# Patient Record
Sex: MALE | Race: BLACK OR AFRICAN AMERICAN | NOT HISPANIC OR LATINO | Employment: OTHER | URBAN - METROPOLITAN AREA
[De-identification: names, ages, dates, MRNs, and addresses within clinical notes are randomized per-mention and may not be internally consistent; named-entity substitution may affect disease eponyms.]

---

## 2018-03-08 ENCOUNTER — PES CALL (OUTPATIENT)
Dept: ADMINISTRATIVE | Facility: CLINIC | Age: 73
End: 2018-03-08

## 2019-03-19 ENCOUNTER — OFFICE VISIT (OUTPATIENT)
Dept: FAMILY MEDICINE | Facility: CLINIC | Age: 74
End: 2019-03-19
Payer: MEDICARE

## 2019-03-19 VITALS
TEMPERATURE: 98 F | BODY MASS INDEX: 21.08 KG/M2 | OXYGEN SATURATION: 99 % | HEART RATE: 95 BPM | HEIGHT: 70 IN | DIASTOLIC BLOOD PRESSURE: 85 MMHG | SYSTOLIC BLOOD PRESSURE: 137 MMHG | WEIGHT: 147.25 LBS

## 2019-03-19 DIAGNOSIS — R73.09 ABNORMAL GLUCOSE: ICD-10-CM

## 2019-03-19 DIAGNOSIS — R94.6 ABNORMAL THYROID EXAM: ICD-10-CM

## 2019-03-19 DIAGNOSIS — Z13.6 ENCOUNTER FOR ABDOMINAL AORTIC ANEURYSM (AAA) SCREENING: ICD-10-CM

## 2019-03-19 DIAGNOSIS — E55.9 VITAMIN D DEFICIENCY: ICD-10-CM

## 2019-03-19 DIAGNOSIS — E78.5 HYPERLIPIDEMIA, UNSPECIFIED HYPERLIPIDEMIA TYPE: ICD-10-CM

## 2019-03-19 DIAGNOSIS — I10 ESSENTIAL (PRIMARY) HYPERTENSION: ICD-10-CM

## 2019-03-19 DIAGNOSIS — Z00.00 ANNUAL PHYSICAL EXAM: Primary | ICD-10-CM

## 2019-03-19 DIAGNOSIS — Z12.11 COLON CANCER SCREENING: ICD-10-CM

## 2019-03-19 DIAGNOSIS — Z23 NEED FOR PNEUMOCOCCAL VACCINE: ICD-10-CM

## 2019-03-19 PROCEDURE — 3075F SYST BP GE 130 - 139MM HG: CPT | Mod: HCNC,CPTII,S$GLB, | Performed by: FAMILY MEDICINE

## 2019-03-19 PROCEDURE — 99499 RISK ADDL DX/OHS AUDIT: ICD-10-PCS | Mod: S$GLB,,, | Performed by: FAMILY MEDICINE

## 2019-03-19 PROCEDURE — 99999 PR PBB SHADOW E&M-EST. PATIENT-LVL III: ICD-10-PCS | Mod: PBBFAC,HCNC,, | Performed by: FAMILY MEDICINE

## 2019-03-19 PROCEDURE — G0009 ADMIN PNEUMOCOCCAL VACCINE: HCPCS | Mod: HCNC,S$GLB,, | Performed by: FAMILY MEDICINE

## 2019-03-19 PROCEDURE — 99999 PR PBB SHADOW E&M-EST. PATIENT-LVL III: CPT | Mod: PBBFAC,HCNC,, | Performed by: FAMILY MEDICINE

## 2019-03-19 PROCEDURE — 99499 UNLISTED E&M SERVICE: CPT | Mod: S$GLB,,, | Performed by: FAMILY MEDICINE

## 2019-03-19 PROCEDURE — 99397 PER PM REEVAL EST PAT 65+ YR: CPT | Mod: HCNC,25,S$GLB, | Performed by: FAMILY MEDICINE

## 2019-03-19 PROCEDURE — 99397 PR PREVENTIVE VISIT,EST,65 & OVER: ICD-10-PCS | Mod: HCNC,25,S$GLB, | Performed by: FAMILY MEDICINE

## 2019-03-19 PROCEDURE — G0009 PNEUMOCOCCAL CONJUGATE VACCINE 13-VALENT LESS THAN 5YO & GREATER THAN: ICD-10-PCS | Mod: HCNC,S$GLB,, | Performed by: FAMILY MEDICINE

## 2019-03-19 PROCEDURE — 90670 PNEUMOCOCCAL CONJUGATE VACCINE 13-VALENT LESS THAN 5YO & GREATER THAN: ICD-10-PCS | Mod: HCNC,S$GLB,, | Performed by: FAMILY MEDICINE

## 2019-03-19 PROCEDURE — 3079F PR MOST RECENT DIASTOLIC BLOOD PRESSURE 80-89 MM HG: ICD-10-PCS | Mod: HCNC,CPTII,S$GLB, | Performed by: FAMILY MEDICINE

## 2019-03-19 PROCEDURE — 90670 PCV13 VACCINE IM: CPT | Mod: HCNC,S$GLB,, | Performed by: FAMILY MEDICINE

## 2019-03-19 PROCEDURE — 3079F DIAST BP 80-89 MM HG: CPT | Mod: HCNC,CPTII,S$GLB, | Performed by: FAMILY MEDICINE

## 2019-03-19 PROCEDURE — 3075F PR MOST RECENT SYSTOLIC BLOOD PRESS GE 130-139MM HG: ICD-10-PCS | Mod: HCNC,CPTII,S$GLB, | Performed by: FAMILY MEDICINE

## 2019-03-19 RX ORDER — HYDROCHLOROTHIAZIDE 12.5 MG/1
12.5 CAPSULE ORAL DAILY
Qty: 90 CAPSULE | Refills: 0 | Status: SHIPPED | OUTPATIENT
Start: 2019-03-19 | End: 2019-05-24 | Stop reason: SDUPTHER

## 2019-03-19 NOTE — PROGRESS NOTES
Office Visit    Patient Name: Gera Londono    : 1945  MRN: [id number]      Assessment/Plan:  Gera Londono is a 74 y.o. male who presents today for :    Annual physical exam  -     CBC Without Differential; Future; Expected date: 2019  -     Comprehensive metabolic panel; Future; Expected date: 2019  -     Hepatitis C antibody; Future; Expected date: 2019  -     Urinalysis Microscopic; Future; Expected date: 2019  -labs reviewed with patient  -anticipatory guidance provided with age appropriate preventative services discussed, healthy diet and regular physical exercise also discussed with patient  -Diet and exercise planning discussed.  -Recommend 15-30 minutes of moderate intensity exercise 5 days/week.  -Patient was advised to get Zostrix/Td at the pharmacy.    Essential (primary) hypertension  -     hydroCHLOROthiazide (MICROZIDE) 12.5 mg capsule; Take 1 capsule (12.5 mg total) by mouth once daily.  Dispense: 90 capsule; Refill: 0  - continue current medication  - ?advised DASH diet, portion control, regular cardiovascular exercises    Colon cancer screening  -     Case request GI: COLONOSCOPY    Hyperlipidemia, unspecified hyperlipidemia type  -     Lipid panel; Future; Expected date: 2019    Abnormal glucose  -     Hemoglobin A1c; Future; Expected date: 2019    Need for pneumococcal vaccine  -     Pneumococcal Conjugate Vaccine (13 Valent) (IM)    Encounter for abdominal aortic aneurysm (AAA) screening  -     US Abdominal Aorta; Future; Expected date: 2019    Vitamin D deficiency  -     Vitamin D; Future; Expected date: 2019    Abnormal thyroid exam  -     TSH; Future; Expected date: 2019      Follow-up in about 4 weeks (around 2019) for any evaluation as needed.     This note was created by combination of typed  and Dragon dictation.  Transcription errors may be present.  If there are any questions, please contact  me.        ----------------------------------------------------------------------------------------------------------------------      HPI:  Patient Care Team:  Gopal Hobbs MD as PCP - General (Family Medicine)    Gera is a 74 y.o. male with      Patient Active Problem List   Diagnosis    Essential (primary) hypertension    Hyperlipidemia    Vitamin D deficiency     This patient is new to me   Patient presents today for:  Annual Exam        Patient is doing well and has no major complaints today. The only medication he takes on a daily basis is his HCTZ 12.5 mg daily - he reports that BP is well controlled at home, compliant with  medications daily without any adverse side effects. Overall, he has good appetite, no sleeping issues. He had a history of low Vit D and wants to get it checked today.   He is a former longtime smoker. He is due for colonoscopy, which we will order today    Additional ROS  No F/C/fatigue  No dysphagia/sore throat/rhinorrhea  No CP/TREVINO/palpitations/swelling  No cough/wheezing/SOB  No nausea/vomiting/abd pain/no diarrhea, no constipation, no blood in stool  No muscle aches, +chronic LBP   No rashes  No MSK weakness/HA/tingling/numbness  No anxiety/depression  No dysuria/hematuria  No polyuria/polydipsia/fatigue/cold or hot intolerance          Current Medications  Medications reviewed and updated.       Current Outpatient Medications:     atorvastatin (LIPITOR) 20 MG tablet, Take 1 tablet (20 mg total) by mouth once daily., Disp: 90 tablet, Rfl: 3    ergocalciferol (ERGOCALCIFEROL) 50,000 unit Cap, Take 1 capsule (50,000 Units total) by mouth every 7 days., Disp: 4 capsule, Rfl: 5    hydroCHLOROthiazide (MICROZIDE) 12.5 mg capsule, Take 1 capsule (12.5 mg total) by mouth once daily., Disp: 90 capsule, Rfl: 0    History reviewed. No pertinent surgical history.    Family History   Problem Relation Age of Onset    Diabetes Mother     Hypertension Mother     Arthritis Mother      "Arthritis Father     Diabetes Father     Hypertension Father     Heart disease Father     Diabetes Sister     Hypertension Sister     Arthritis Brother        Social History     Socioeconomic History    Marital status: Single     Spouse name: Not on file    Number of children: Not on file    Years of education: Not on file    Highest education level: Not on file   Social Needs    Financial resource strain: Not on file    Food insecurity - worry: Not on file    Food insecurity - inability: Not on file    Transportation needs - medical: Not on file    Transportation needs - non-medical: Not on file   Occupational History    Not on file   Tobacco Use    Smoking status: Former Smoker   Substance and Sexual Activity    Alcohol use: Not on file    Drug use: No    Sexual activity: Not on file   Other Topics Concern    Not on file   Social History Narrative    Not on file           Allergies   Review of patient's allergies indicates:  No Known Allergies          Review of Systems  See HPI      Physical Exam  /85   Pulse 95   Temp 97.7 °F (36.5 °C)   Ht 5' 10" (1.778 m)   Wt 66.8 kg (147 lb 4.3 oz)   SpO2 99%   BMI 21.13 kg/m²     GEN: NAD, well developed, pleasant, well nourished  HEENT: NCAT, PERRLA, EOMI, sclera clear, anicteric, bilateral ear exam wnl, O/P clear, MMM with no lesions  NECK: normal, supple with midline trachea, no LAD, no thyromegaly  LUNGS: CTAB, no w/r/r, no increased work of breathing   HEART: RRR, normal S1 and S2, no m/r/g, no edema  ABD: s/nt/nd, NABS  SKIN: normal turgor, no rashes  PSYCH: AOx3, appropriate mood and affect  MSK: warm/well perfused, normal ROM in all extremities, no c/c/e.      Labs  Lab Results   Component Value Date    HGBA1C 5.3 03/20/2019     Lab Results   Component Value Date     03/20/2019    K 4.4 03/20/2019     03/20/2019    CO2 22 (L) 03/20/2019    BUN 22 03/20/2019    CREATININE 1.4 03/20/2019    CALCIUM 10.7 (H) 03/20/2019    " ANIONGAP 13 03/20/2019    ESTGFRAFRICA 57.2 (A) 03/20/2019    EGFRNONAA 49.5 (A) 03/20/2019     Lab Results   Component Value Date    CHOL 225 (H) 03/20/2019    CHOL 223 (H) 06/13/2011    CHOL 260 (H) 05/23/2011     Lab Results   Component Value Date    HDL 53 03/20/2019    HDL 49 06/13/2011     Lab Results   Component Value Date    LDLCALC 157.0 03/20/2019    LDLCALC 158.6 06/13/2011     Lab Results   Component Value Date    TRIG 75 03/20/2019    TRIG 77 06/13/2011     Lab Results   Component Value Date    CHOLHDL 23.6 03/20/2019    CHOLHDL 22.0 06/13/2011     Last set of blood work has been reviewed as noted above.

## 2019-03-20 ENCOUNTER — LAB VISIT (OUTPATIENT)
Dept: LAB | Facility: HOSPITAL | Age: 74
End: 2019-03-20
Attending: FAMILY MEDICINE
Payer: MEDICARE

## 2019-03-20 DIAGNOSIS — Z00.00 ANNUAL PHYSICAL EXAM: ICD-10-CM

## 2019-03-20 DIAGNOSIS — E78.5 HYPERLIPIDEMIA, UNSPECIFIED HYPERLIPIDEMIA TYPE: ICD-10-CM

## 2019-03-20 DIAGNOSIS — R94.6 ABNORMAL THYROID EXAM: ICD-10-CM

## 2019-03-20 DIAGNOSIS — R73.09 ABNORMAL GLUCOSE: ICD-10-CM

## 2019-03-20 DIAGNOSIS — E55.9 VITAMIN D DEFICIENCY: ICD-10-CM

## 2019-03-20 LAB
25(OH)D3+25(OH)D2 SERPL-MCNC: 10 NG/ML
ALBUMIN SERPL BCP-MCNC: 4.3 G/DL
ALP SERPL-CCNC: 66 U/L
ALT SERPL W/O P-5'-P-CCNC: 9 U/L
ANION GAP SERPL CALC-SCNC: 13 MMOL/L
AST SERPL-CCNC: 18 U/L
BILIRUB SERPL-MCNC: 0.8 MG/DL
BUN SERPL-MCNC: 22 MG/DL
CALCIUM SERPL-MCNC: 10.7 MG/DL
CHLORIDE SERPL-SCNC: 103 MMOL/L
CHOLEST SERPL-MCNC: 225 MG/DL
CHOLEST/HDLC SERPL: 4.2 {RATIO}
CO2 SERPL-SCNC: 22 MMOL/L
CREAT SERPL-MCNC: 1.4 MG/DL
ERYTHROCYTE [DISTWIDTH] IN BLOOD BY AUTOMATED COUNT: 13.2 %
EST. GFR  (AFRICAN AMERICAN): 57.2 ML/MIN/1.73 M^2
EST. GFR  (NON AFRICAN AMERICAN): 49.5 ML/MIN/1.73 M^2
ESTIMATED AVG GLUCOSE: 105 MG/DL
GLUCOSE SERPL-MCNC: 113 MG/DL
HBA1C MFR BLD HPLC: 5.3 %
HCT VFR BLD AUTO: 42 %
HDLC SERPL-MCNC: 53 MG/DL
HDLC SERPL: 23.6 %
HGB BLD-MCNC: 14.2 G/DL
LDLC SERPL CALC-MCNC: 157 MG/DL
MCH RBC QN AUTO: 30.9 PG
MCHC RBC AUTO-ENTMCNC: 33.8 G/DL
MCV RBC AUTO: 92 FL
NONHDLC SERPL-MCNC: 172 MG/DL
PLATELET # BLD AUTO: 229 K/UL
PMV BLD AUTO: 9.9 FL
POTASSIUM SERPL-SCNC: 4.4 MMOL/L
PROT SERPL-MCNC: 8.8 G/DL
RBC # BLD AUTO: 4.59 M/UL
SODIUM SERPL-SCNC: 138 MMOL/L
TRIGL SERPL-MCNC: 75 MG/DL
TSH SERPL DL<=0.005 MIU/L-ACNC: 1.61 UIU/ML
WBC # BLD AUTO: 5.73 K/UL

## 2019-03-20 PROCEDURE — 86803 HEPATITIS C AB TEST: CPT | Mod: HCNC

## 2019-03-20 PROCEDURE — 84443 ASSAY THYROID STIM HORMONE: CPT | Mod: HCNC

## 2019-03-20 PROCEDURE — 36415 COLL VENOUS BLD VENIPUNCTURE: CPT | Mod: HCNC,PO

## 2019-03-20 PROCEDURE — 83036 HEMOGLOBIN GLYCOSYLATED A1C: CPT | Mod: HCNC

## 2019-03-20 PROCEDURE — 85027 COMPLETE CBC AUTOMATED: CPT | Mod: HCNC

## 2019-03-20 PROCEDURE — 80061 LIPID PANEL: CPT | Mod: HCNC

## 2019-03-20 PROCEDURE — 80053 COMPREHEN METABOLIC PANEL: CPT | Mod: HCNC

## 2019-03-20 PROCEDURE — 82306 VITAMIN D 25 HYDROXY: CPT | Mod: HCNC

## 2019-03-21 PROBLEM — E78.5 HYPERLIPIDEMIA: Status: ACTIVE | Noted: 2019-03-21

## 2019-03-21 PROBLEM — E55.9 VITAMIN D DEFICIENCY: Status: ACTIVE | Noted: 2019-03-21

## 2019-03-21 LAB — HCV AB SERPL QL IA: NEGATIVE

## 2019-03-21 RX ORDER — ERGOCALCIFEROL 1.25 MG/1
50000 CAPSULE ORAL
Qty: 4 CAPSULE | Refills: 5 | Status: SHIPPED | OUTPATIENT
Start: 2019-03-21 | End: 2019-05-24 | Stop reason: SDUPTHER

## 2019-03-21 RX ORDER — ATORVASTATIN CALCIUM 20 MG/1
20 TABLET, FILM COATED ORAL DAILY
Qty: 90 TABLET | Refills: 3 | Status: SHIPPED | OUTPATIENT
Start: 2019-03-21 | End: 2019-05-24 | Stop reason: SDUPTHER

## 2019-03-23 ENCOUNTER — TELEPHONE (OUTPATIENT)
Dept: FAMILY MEDICINE | Facility: CLINIC | Age: 74
End: 2019-03-23

## 2019-03-23 NOTE — TELEPHONE ENCOUNTER
----- Message from Lily Camp sent at 3/20/2019  3:41 PM CDT -----  Contact: [facility] - dharmesh Holland requesting clinical notes for bedside orders. Fax # [phone] phone # [phone]

## 2019-03-25 ENCOUNTER — TELEPHONE (OUTPATIENT)
Dept: FAMILY MEDICINE | Facility: CLINIC | Age: 74
End: 2019-03-25

## 2019-03-25 NOTE — TELEPHONE ENCOUNTER
----- Message from Sara Santizo LPN sent at 3/21/2019  3:33 PM CDT -----  Just spoke with Client regarding scheduling colonoscopy who stated he was told  that he would not need another colonoscopy since he was 74 years old.  He is refusing to schedule  If he still needs one please reach out to him and I will be happy to schedule this for him. KILEY Dunn

## 2019-03-25 NOTE — TELEPHONE ENCOUNTER
Patient stated he is not ready to schedule his Colonoscopy at this time to call him back in a month to schedule.

## 2019-03-25 NOTE — TELEPHONE ENCOUNTER
Please have patient obtain Colonoscopy.       Sara,   Patient does need a Colonoscopy, not sure why he said that he was told he didn't need one, as the Colonoscopy was ordered by me.     Thanks,     Dr. BENAVIDES

## 2019-04-03 ENCOUNTER — TELEPHONE (OUTPATIENT)
Dept: FAMILY MEDICINE | Facility: CLINIC | Age: 74
End: 2019-04-03

## 2019-04-03 NOTE — TELEPHONE ENCOUNTER
----- Message from Broderick Vazquez sent at 4/2/2019 11:30 AM CDT -----  Contact: Kelsey wife/[phone]  The patient wife would like to speak to the staff in regards to home health for the patient.            Thank you

## 2019-04-03 NOTE — TELEPHONE ENCOUNTER
Spoke to patients wife about her wanting him to have home health. She just wanted us to know that [facility] may be calling about his home health.

## 2019-05-20 ENCOUNTER — TELEPHONE (OUTPATIENT)
Dept: FAMILY MEDICINE | Facility: CLINIC | Age: 74
End: 2019-05-20

## 2019-05-20 NOTE — TELEPHONE ENCOUNTER
----- Message from Deangelo Garza sent at 5/20/2019 12:31 PM CDT -----  Contact: Kelsey [phone]  Type: Patient Call Back    Who called:Kelsey    What is the request in detail: Kelsey, care giver of the patient, is requesting a call back from the staff in regards to home health order for the patient    Can the clinic reply by [facility]?no    Would the patient rather a call back or a response via [facility]? Call back    Best call back number:[phone]

## 2019-05-20 NOTE — TELEPHONE ENCOUNTER
Spoke with patient's daughter and she is looking for orders for home health service to come out to bath patient, fix his meal and runs errands for patient. Informed her that Home Health services does not provider these services and that he is looking for PCA services. She will need to contact Medicaid to see what companies in her area provides this services.

## 2019-05-24 DIAGNOSIS — E78.5 HYPERLIPIDEMIA, UNSPECIFIED HYPERLIPIDEMIA TYPE: ICD-10-CM

## 2019-05-24 DIAGNOSIS — I10 ESSENTIAL (PRIMARY) HYPERTENSION: ICD-10-CM

## 2019-05-24 DIAGNOSIS — E55.9 VITAMIN D DEFICIENCY: ICD-10-CM

## 2019-05-25 RX ORDER — ATORVASTATIN CALCIUM 20 MG/1
20 TABLET, FILM COATED ORAL DAILY
Qty: 90 TABLET | Refills: 3 | Status: SHIPPED | OUTPATIENT
Start: 2019-05-25 | End: 2020-02-18 | Stop reason: SDUPTHER

## 2019-05-25 RX ORDER — ERGOCALCIFEROL 1.25 MG/1
50000 CAPSULE ORAL
Qty: 4 CAPSULE | Refills: 5 | Status: SHIPPED | OUTPATIENT
Start: 2019-05-25 | End: 2019-10-01 | Stop reason: SDUPTHER

## 2019-05-25 RX ORDER — HYDROCHLOROTHIAZIDE 12.5 MG/1
12.5 CAPSULE ORAL DAILY
Qty: 90 CAPSULE | Refills: 0 | Status: SHIPPED | OUTPATIENT
Start: 2019-05-25 | End: 2019-08-12 | Stop reason: SDUPTHER

## 2019-05-29 ENCOUNTER — TELEPHONE (OUTPATIENT)
Dept: FAMILY MEDICINE | Facility: CLINIC | Age: 74
End: 2019-05-29

## 2019-05-29 NOTE — TELEPHONE ENCOUNTER
----- Message from Natacha Lyon sent at 5/29/2019  3:34 PM CDT -----  Contact: Mary Estrada  Type:  Diabetic/Medical Supplies Request    Name of Caller: Mary Estrada    What supplies are needed: [employer] True metrix air meter, Test strips, true plus ultra thin 30g lancet, BD single use swabs, true level 1 control solutions    What is the brand of the supplies: True Metrix    Refill or New Rx: New     If checking glucose, how many times do they check it?:    Who prescribed the original supplies:    Pharmacy/Company Name, Phone #, Location: [facility]     Requesting a Call Back:  Call back    Would the patient rather a call back or a response via [facility]?  Call back    Best Call Back Number:[phone]    Additional Information:

## 2019-05-30 NOTE — TELEPHONE ENCOUNTER
Patient is not a diabetic to my knowledge per history and his last A1c was normal.     Please check with patient to see if he was sent diabetic supplies prior to today as [employer] contacted our office to reorder diabetic supplies.

## 2019-06-12 ENCOUNTER — TELEPHONE (OUTPATIENT)
Dept: FAMILY MEDICINE | Facility: CLINIC | Age: 74
End: 2019-06-12

## 2019-06-12 DIAGNOSIS — Z74.09 LIMITED MOBILITY: Primary | ICD-10-CM

## 2019-06-12 NOTE — TELEPHONE ENCOUNTER
----- Message from Jessica Sosa sent at 6/12/2019  3:41 PM CDT -----  Contact: [facility] georgina/Marcio  [phone] may leave a detailed message  Type: Nallely erickson/Marcio Call Back    Who called: Nallely    What is the request in detail: Patient is requesting a walker. With his insurance he needs an in network DME company. So she's calling to find who the Rx was sent to. Tried reaching the patient, but was unable to.    Would the patient rather a call back or a response via [facility]? Call back    Best call back number: [phone] may leave a detailed message.    Additional Information: Need to know if this was something Dr. Hobbs was trying to work on or not. If not, and you would like [facility] to, please call them.

## 2019-06-13 NOTE — TELEPHONE ENCOUNTER
Spoke with Nallely about patient needing walker but he needs a in network DME . Advised Nallely to use [facility] since patient does not have one listed

## 2019-06-13 NOTE — TELEPHONE ENCOUNTER
Order placed.    Please contact patient, and have a print out for order for patient to pickup or mail to patient. May need to call insurance carrier to see where they would want him to have this order sent to.    Limited mobility  -     WALKER FOR HOME USE

## 2019-08-12 ENCOUNTER — PES CALL (OUTPATIENT)
Dept: ADMINISTRATIVE | Facility: CLINIC | Age: 74
End: 2019-08-12

## 2019-08-12 DIAGNOSIS — I10 ESSENTIAL (PRIMARY) HYPERTENSION: ICD-10-CM

## 2019-08-12 DIAGNOSIS — E55.9 VITAMIN D DEFICIENCY: ICD-10-CM

## 2019-08-13 RX ORDER — HYDROCHLOROTHIAZIDE 12.5 MG/1
CAPSULE ORAL
Qty: 20 CAPSULE | Refills: 0 | Status: SHIPPED | OUTPATIENT
Start: 2019-08-13 | End: 2019-09-23 | Stop reason: SDUPTHER

## 2019-08-13 RX ORDER — ERGOCALCIFEROL 1.25 MG/1
CAPSULE ORAL
Qty: 13 CAPSULE | Refills: 5 | OUTPATIENT
Start: 2019-08-13

## 2019-09-23 DIAGNOSIS — I10 ESSENTIAL (PRIMARY) HYPERTENSION: ICD-10-CM

## 2019-09-23 RX ORDER — HYDROCHLOROTHIAZIDE 12.5 MG/1
CAPSULE ORAL
Qty: 14 CAPSULE | Refills: 0 | Status: SHIPPED | OUTPATIENT
Start: 2019-09-23 | End: 2019-09-23 | Stop reason: SDUPTHER

## 2019-09-23 NOTE — TELEPHONE ENCOUNTER
----- Message from Jessica Sosa sent at 9/23/2019  2:24 PM CDT -----  Contact: Fernandata Kelsey Choi  [phone]  Type: RX Refill Request    Who Called:Red Choi    Refill or New Rx: Refill    RX Name and Strength: hydroCHLOROthiazide (MICROZIDE) 12.5 mg capsule    Is this a 30 day or 90 day RX: 90 day    Preferred Pharmacy with phone number: .  [facility] - [place], [place] - [address]  [address] [address]  Phone: [phone] Fax: [phone]    Local or Mail Order: Local    Would the patient rather a call back or a response via [facility]? Call back    Best Call Back Number: [phone]    Additional Information:  Patient have 5 pills left and will need a refill called in before he runs out.

## 2019-09-24 RX ORDER — HYDROCHLOROTHIAZIDE 12.5 MG/1
CAPSULE ORAL
Qty: 30 CAPSULE | Refills: 0 | Status: SHIPPED | OUTPATIENT
Start: 2019-09-24 | End: 2019-10-01 | Stop reason: SDUPTHER

## 2019-10-01 DIAGNOSIS — E55.9 VITAMIN D DEFICIENCY: ICD-10-CM

## 2019-10-01 DIAGNOSIS — I10 ESSENTIAL (PRIMARY) HYPERTENSION: ICD-10-CM

## 2019-10-02 RX ORDER — HYDROCHLOROTHIAZIDE 12.5 MG/1
CAPSULE ORAL
Qty: 15 CAPSULE | Refills: 0 | Status: SHIPPED | OUTPATIENT
Start: 2019-10-02 | End: 2019-12-05 | Stop reason: SDUPTHER

## 2019-10-02 RX ORDER — ERGOCALCIFEROL 1.25 MG/1
CAPSULE ORAL
Qty: 13 CAPSULE | Refills: 5 | Status: SHIPPED | OUTPATIENT
Start: 2019-10-02 | End: 2020-02-18 | Stop reason: SDUPTHER

## 2019-11-21 DIAGNOSIS — I10 ESSENTIAL (PRIMARY) HYPERTENSION: ICD-10-CM

## 2019-11-22 RX ORDER — HYDROCHLOROTHIAZIDE 12.5 MG/1
CAPSULE ORAL
Qty: 90 CAPSULE | Refills: 0 | OUTPATIENT
Start: 2019-11-22

## 2019-12-04 ENCOUNTER — TELEPHONE (OUTPATIENT)
Dept: FAMILY MEDICINE | Facility: CLINIC | Age: 74
End: 2019-12-04

## 2019-12-04 DIAGNOSIS — I10 ESSENTIAL (PRIMARY) HYPERTENSION: ICD-10-CM

## 2019-12-04 NOTE — TELEPHONE ENCOUNTER
----- Message from Kleber Salgado sent at 12/4/2019  3:57 PM CST -----  Contact: sELF  Type: RX Refill Request    Who Called:  SELF  Have you contacted your pharmacy: NO   Refill or New Rx: REFILL     RX Name and Strength: hydroCHLOROthiazide (MICROZIDE) 12.5 mg capsule    Preferred Pharmacy with phone number: [facility] - [place] [address] [address] [phone] (Phone)  [phone] (Fax)    Local or Mail Order: LOCAL     Ordering Provider: DAIR    Would the patient rather a call back or a response via [facility]? CALL     Best Call Back Number: [phone]

## 2019-12-05 RX ORDER — HYDROCHLOROTHIAZIDE 12.5 MG/1
12.5 CAPSULE ORAL DAILY
Qty: 15 CAPSULE | Refills: 0 | Status: SHIPPED | OUTPATIENT
Start: 2019-12-05 | End: 2020-01-02 | Stop reason: SDUPTHER

## 2020-01-02 DIAGNOSIS — I10 ESSENTIAL (PRIMARY) HYPERTENSION: ICD-10-CM

## 2020-01-02 RX ORDER — HYDROCHLOROTHIAZIDE 12.5 MG/1
12.5 CAPSULE ORAL DAILY
Qty: 15 CAPSULE | Refills: 0 | Status: SHIPPED | OUTPATIENT
Start: 2020-01-02 | End: 2020-01-03 | Stop reason: SDUPTHER

## 2020-01-02 NOTE — TELEPHONE ENCOUNTER
----- Message from Bobby Hanks sent at 1/2/2020 11:43 AM CST -----  Contact: Self      Can the clinic reply in [facility]:Y        Please refill the medication(s) listed below. Please call the patient when the prescription(s) is ready for  at this phone number. Pt would like to know if he can get a 90 day supply.      Medication #1 hydroCHLOROthiazide (MICROZIDE) 12.5 mg capsule    Medication #2       Preferred Pharmacy: [id number] [address] [place] [address]      Kelsey Moon [phone]

## 2020-01-03 DIAGNOSIS — I10 ESSENTIAL (PRIMARY) HYPERTENSION: ICD-10-CM

## 2020-01-03 RX ORDER — HYDROCHLOROTHIAZIDE 12.5 MG/1
12.5 CAPSULE ORAL DAILY
Qty: 30 CAPSULE | Refills: 0 | Status: SHIPPED | OUTPATIENT
Start: 2020-01-03 | End: 2020-01-13 | Stop reason: SDUPTHER

## 2020-01-03 RX ORDER — HYDROCHLOROTHIAZIDE 12.5 MG/1
12.5 CAPSULE ORAL DAILY
Qty: 15 CAPSULE | Refills: 0 | Status: CANCELLED | OUTPATIENT
Start: 2020-01-03

## 2020-01-03 NOTE — TELEPHONE ENCOUNTER
Spoke with pt Red and informed her of the 30 day supply and that he will to schedule a ov for next refill and she said she will call back to schedule it

## 2020-01-03 NOTE — TELEPHONE ENCOUNTER
----- Message from Lily Camp sent at 1/3/2020  9:38 AM CST -----  Contact: beth cheek   Type: Patient Call Back    What is the request in detail: Beth calling to speak to a nurse regarding medication.     Can the clinic reply by [facility]? No    Would the patient rather a call back or a response via [facility]? Call back     Best call back number: [phone]

## 2020-01-03 NOTE — TELEPHONE ENCOUNTER
Spoke with pt beth Cole she stated pt will like 90 day supply for Hydrochlorothiazide I informed her that pt will needs a ov he haven't been seen since March 2019 Ms. Cole stated pt don't want to come in because he is not sick I offer to make f/u appt  she declined

## 2020-01-06 RX ORDER — HYDROCHLOROTHIAZIDE 12.5 MG/1
CAPSULE ORAL
Qty: 90 CAPSULE | OUTPATIENT
Start: 2020-01-06

## 2020-01-13 DIAGNOSIS — I10 ESSENTIAL (PRIMARY) HYPERTENSION: ICD-10-CM

## 2020-01-13 RX ORDER — HYDROCHLOROTHIAZIDE 12.5 MG/1
12.5 CAPSULE ORAL DAILY
Qty: 30 CAPSULE | Refills: 0 | Status: SHIPPED | OUTPATIENT
Start: 2020-01-13 | End: 2020-02-18 | Stop reason: SDUPTHER

## 2020-01-13 NOTE — TELEPHONE ENCOUNTER
----- Message from Jessica Sosa sent at 1/13/2020  1:19 PM CST -----  Contact: Red Choi  [phone]  Type: RX Refill Request    Who Called: Red Choi    Have you contacted your pharmacy: Yes. Medication was sent back to stock. Now please send to Mail Order Pharmacy.    Refill or New Rx: Refill    RX Name and Strength: hydroCHLOROthiazide (MICROZIDE) 12.5 mg capsule    Is this a 30 day or 90 day RX: 90 day    Preferred Pharmacy with phone number: .    [facility] - [place], [place] - [address]  [address] [facility] [address]  Phone: [phone] Fax: [phone]    Local or Mail Order: Mail Order    Would the patient rather a call back or a response via [facility]? Call back    Best Call Back Number: [phone]

## 2020-02-18 ENCOUNTER — OFFICE VISIT (OUTPATIENT)
Dept: FAMILY MEDICINE | Facility: CLINIC | Age: 75
End: 2020-02-18
Payer: MEDICARE

## 2020-02-18 VITALS
WEIGHT: 151.69 LBS | TEMPERATURE: 98 F | BODY MASS INDEX: 21.72 KG/M2 | OXYGEN SATURATION: 99 % | SYSTOLIC BLOOD PRESSURE: 137 MMHG | DIASTOLIC BLOOD PRESSURE: 85 MMHG | HEART RATE: 95 BPM | HEIGHT: 70 IN

## 2020-02-18 DIAGNOSIS — E78.5 HYPERLIPIDEMIA, UNSPECIFIED HYPERLIPIDEMIA TYPE: ICD-10-CM

## 2020-02-18 DIAGNOSIS — E55.9 VITAMIN D DEFICIENCY: ICD-10-CM

## 2020-02-18 DIAGNOSIS — Z12.11 COLON CANCER SCREENING: ICD-10-CM

## 2020-02-18 DIAGNOSIS — R54 AGE-RELATED PHYSICAL DEBILITY: ICD-10-CM

## 2020-02-18 DIAGNOSIS — I10 ESSENTIAL (PRIMARY) HYPERTENSION: Primary | ICD-10-CM

## 2020-02-18 DIAGNOSIS — E78.49 OTHER HYPERLIPIDEMIA: ICD-10-CM

## 2020-02-18 DIAGNOSIS — R79.9 ABNORMAL FINDING OF BLOOD CHEMISTRY, UNSPECIFIED: ICD-10-CM

## 2020-02-18 DIAGNOSIS — N18.30 CKD (CHRONIC KIDNEY DISEASE) STAGE 3, GFR 30-59 ML/MIN: ICD-10-CM

## 2020-02-18 PROCEDURE — 3075F SYST BP GE 130 - 139MM HG: CPT | Mod: HCNC,CPTII,S$GLB, | Performed by: FAMILY MEDICINE

## 2020-02-18 PROCEDURE — 1159F MED LIST DOCD IN RCRD: CPT | Mod: HCNC,S$GLB,, | Performed by: FAMILY MEDICINE

## 2020-02-18 PROCEDURE — 3075F PR MOST RECENT SYSTOLIC BLOOD PRESS GE 130-139MM HG: ICD-10-PCS | Mod: HCNC,CPTII,S$GLB, | Performed by: FAMILY MEDICINE

## 2020-02-18 PROCEDURE — 3079F PR MOST RECENT DIASTOLIC BLOOD PRESSURE 80-89 MM HG: ICD-10-PCS | Mod: HCNC,CPTII,S$GLB, | Performed by: FAMILY MEDICINE

## 2020-02-18 PROCEDURE — 99214 OFFICE O/P EST MOD 30 MIN: CPT | Mod: HCNC,S$GLB,, | Performed by: FAMILY MEDICINE

## 2020-02-18 PROCEDURE — 1126F AMNT PAIN NOTED NONE PRSNT: CPT | Mod: HCNC,S$GLB,, | Performed by: FAMILY MEDICINE

## 2020-02-18 PROCEDURE — 1101F PR PT FALLS ASSESS DOC 0-1 FALLS W/OUT INJ PAST YR: ICD-10-PCS | Mod: HCNC,CPTII,S$GLB, | Performed by: FAMILY MEDICINE

## 2020-02-18 PROCEDURE — 1159F PR MEDICATION LIST DOCUMENTED IN MEDICAL RECORD: ICD-10-PCS | Mod: HCNC,S$GLB,, | Performed by: FAMILY MEDICINE

## 2020-02-18 PROCEDURE — 99214 PR OFFICE/OUTPT VISIT, EST, LEVL IV, 30-39 MIN: ICD-10-PCS | Mod: HCNC,S$GLB,, | Performed by: FAMILY MEDICINE

## 2020-02-18 PROCEDURE — 3079F DIAST BP 80-89 MM HG: CPT | Mod: HCNC,CPTII,S$GLB, | Performed by: FAMILY MEDICINE

## 2020-02-18 PROCEDURE — 99999 PR PBB SHADOW E&M-EST. PATIENT-LVL III: CPT | Mod: PBBFAC,HCNC,, | Performed by: FAMILY MEDICINE

## 2020-02-18 PROCEDURE — 1126F PR PAIN SEVERITY QUANTIFIED, NO PAIN PRESENT: ICD-10-PCS | Mod: HCNC,S$GLB,, | Performed by: FAMILY MEDICINE

## 2020-02-18 PROCEDURE — 1101F PT FALLS ASSESS-DOCD LE1/YR: CPT | Mod: HCNC,CPTII,S$GLB, | Performed by: FAMILY MEDICINE

## 2020-02-18 PROCEDURE — 99999 PR PBB SHADOW E&M-EST. PATIENT-LVL III: ICD-10-PCS | Mod: PBBFAC,HCNC,, | Performed by: FAMILY MEDICINE

## 2020-02-18 RX ORDER — HYDROCHLOROTHIAZIDE 12.5 MG/1
12.5 CAPSULE ORAL DAILY
Qty: 30 CAPSULE | Refills: 0 | Status: SHIPPED | OUTPATIENT
Start: 2020-02-18 | End: 2020-03-17 | Stop reason: SDUPTHER

## 2020-02-18 RX ORDER — ATORVASTATIN CALCIUM 20 MG/1
20 TABLET, FILM COATED ORAL DAILY
Qty: 90 TABLET | Refills: 1 | Status: SHIPPED | OUTPATIENT
Start: 2020-02-18 | End: 2020-08-10

## 2020-02-18 RX ORDER — ERGOCALCIFEROL 1.25 MG/1
50000 CAPSULE ORAL
Qty: 12 CAPSULE | Refills: 1 | Status: SHIPPED | OUTPATIENT
Start: 2020-02-18 | End: 2020-08-20

## 2020-02-18 RX ORDER — HYDROCHLOROTHIAZIDE 12.5 MG/1
12.5 CAPSULE ORAL DAILY
Qty: 90 CAPSULE | Refills: 1 | Status: SHIPPED | OUTPATIENT
Start: 2020-02-18 | End: 2020-02-18 | Stop reason: SDUPTHER

## 2020-02-18 NOTE — PROGRESS NOTES
Office Visit    Patient Name: Gera Londono    : 1945  MRN: [id number]      Assessment/Plan:  Gera Londono is a 75 y.o. male who presents today for :    Essential (primary) hypertension  -     Hemoglobin A1c; Future; Expected date: 2020  -     CBC Without Differential; Future; Expected date: 2020  -     Comprehensive metabolic panel; Future; Expected date: 2020  -     hydroCHLOROthiazide (MICROZIDE) 12.5 mg capsule; Take 1 capsule (12.5 mg total) by mouth once daily. Follow up Dr. DENITA Hobbs 2020  Dispense: 90 capsule; Refill: 1  Other hyperlipidemia  -     Lipid panel; Future; Expected date: 2020  -continue current medication regimen  -DASH diet, regular cardiovascular exercises    Vitamin D deficiency  -     Vitamin D; Future; Expected date: 2020  -     ergocalciferol (ERGOCALCIFEROL) 50,000 unit Cap; Take 1 capsule (50,000 Units total) by mouth every 7 days. Follow up Dr. DENITA Hobbs 2020  Dispense: 12 capsule; Refill: 1    Colon cancer screening  -     Fecal Immunochemical Test (iFOBT); Future; Expected date: 2020          Follow up 6mo    This note was created by combination of typed  and MModal dictation.  Transcription errors may be present.  If there are any questions, please contact me.      ----------------------------------------------------------------------------------------------------------------------      HPI:  Patient Care Team:  Gopal Hobbs MD as PCP - General (Family Medicine)  Mirela Rucker MA as Care Coordinator    Gera is a 75 y.o. male with      Patient Active Problem List   Diagnosis    Essential (primary) hypertension    Hyperlipidemia    Vitamin D deficiency       Patient presents today for f/u of:  Medication Refill  of his chronic medical issue    HTN - he is  compliant with HCTZ daily - on his last pill of med today.  He has not checked his BP log at home, but his niece, who is his care taker requests to have JOSUE  come out once a week to check his BP.  He denies any vision changes/urinary changes/leg swelling. He also has h/o Vit D deficiency, which we will check the levels again.  HLD - tolerating statin well without any issues. Also due for colon cancer screening.          Additional ROS    No F/C/wt changes/fatigue  No dysphagia/sore throat  No CP/TREVINO/palpitations/swelling  No cough/wheezing/SOB  No nausea/vomiting/abd pain/no diarrhea, no constipation, no blood in stool  No MSK weakness/HA/tingling/numbness  No rashes  No anxiety/depression  No dysuria/hematuria              Patient Active Problem List   Diagnosis    Essential (primary) hypertension    Hyperlipidemia    Vitamin D deficiency       Current Medications  Medications reviewed/updated.     Current Outpatient Medications on File Prior to Visit   Medication Sig Dispense Refill    [DISCONTINUED] atorvastatin (LIPITOR) 20 MG tablet Take 1 tablet (20 mg total) by mouth once daily. 90 tablet 3    [DISCONTINUED] ergocalciferol (ERGOCALCIFEROL) 50,000 unit Cap TAKE 1 CAPSULE EVERY 7 DAYS 13 capsule 5    [DISCONTINUED] hydroCHLOROthiazide (MICROZIDE) 12.5 mg capsule Take 1 capsule (12.5 mg total) by mouth once daily. Followup with doctor MARCH 2020 for more refills. 30 capsule 0     No current facility-administered medications on file prior to visit.            History reviewed. No pertinent surgical history.    Family History   Problem Relation Age of Onset    Diabetes Mother     Hypertension Mother     Arthritis Mother     Arthritis Father     Diabetes Father     Hypertension Father     Heart disease Father     Diabetes Sister     Hypertension Sister     Arthritis Brother        Social History     Socioeconomic History    Marital status: Single     Spouse name: Not on file    Number of children: Not on file    Years of education: Not on file    Highest education level: Not on file   Occupational History    Not on file   Social Needs    Financial  "resource strain: Not on file    Food insecurity:     Worry: Not on file     Inability: Not on file    Transportation needs:     Medical: Not on file     Non-medical: Not on file   Tobacco Use    Smoking status: Former Smoker   Substance and Sexual Activity    Alcohol use: Not on file    Drug use: No    Sexual activity: Not on file   Lifestyle    Physical activity:     Days per week: Not on file     Minutes per session: Not on file    Stress: Not on file   Relationships    Social connections:     Talks on phone: Not on file     Gets together: Not on file     Attends Hindu service: Not on file     Active member of club or organization: Not on file     Attends meetings of clubs or organizations: Not on file     Relationship status: Not on file   Other Topics Concern    Not on file   Social History Narrative    Not on file             Allergies   Review of patient's allergies indicates:  No Known Allergies          Review of Systems  See HPI      Physical Exam  /85   Pulse 95   Temp 98.4 °F (36.9 °C)   Ht 5' 10" (1.778 m)   Wt 68.8 kg (151 lb 10.8 oz)   SpO2 99%   BMI 21.76 kg/m²     GEN: NAD, well developed, pleasant, well nourished  HEENT: NCAT, PERRLA, EOMI, sclera clear, anicteric, O/P clear, MMM with no lesions  NECK: normal, supple with midline trachea, no LAD, no thyromegaly  LUNGS: CTAB, no w/r/r, no increased work of breathing   HEART: RRR, normal S1 and S2, no m/r/g, no edema  ABD: s/nt/nd, NABS  SKIN: normal turgor, no rashes  PSYCH: AOx3, appropriate mood and affect  MSK: warm/well perfused, normal ROM in all extremities, no c/c/e.      "

## 2020-02-21 PROCEDURE — G0180 PR HOME HEALTH MD CERTIFICATION: ICD-10-PCS | Mod: ,,, | Performed by: FAMILY MEDICINE

## 2020-02-21 PROCEDURE — G0180 MD CERTIFICATION HHA PATIENT: HCPCS | Mod: ,,, | Performed by: FAMILY MEDICINE

## 2020-03-11 ENCOUNTER — EXTERNAL HOME HEALTH (OUTPATIENT)
Dept: HOME HEALTH SERVICES | Facility: HOSPITAL | Age: 75
End: 2020-03-11
Payer: MEDICARE

## 2020-03-17 DIAGNOSIS — I10 ESSENTIAL (PRIMARY) HYPERTENSION: ICD-10-CM

## 2020-03-17 RX ORDER — HYDROCHLOROTHIAZIDE 12.5 MG/1
12.5 CAPSULE ORAL DAILY
Qty: 60 CAPSULE | Refills: 1 | Status: SHIPPED | OUTPATIENT
Start: 2020-03-17 | End: 2020-03-19 | Stop reason: SDUPTHER

## 2020-03-17 NOTE — TELEPHONE ENCOUNTER
----- Message from Nuzhat Guzman, Patient Care Assistant sent at 3/17/2020 11:37 AM CDT -----  Contact: Kelsey (beth)  Can the clinic reply in [facility]:     Please refill the medication(s) listed below. The patient can be reached at this phone number once it is called into the pharmacy.[phone]    Medication #1hydroCHLOROthiazide (MICROZIDE) 12.5 mg capsule (Requesting 90 day supply)    Medication #2    Preferred Pharmacy:   [facility] - [facility] [address]  Also requesting emergency supply go to [facility] [phone]

## 2020-03-18 DIAGNOSIS — I10 ESSENTIAL (PRIMARY) HYPERTENSION: ICD-10-CM

## 2020-03-18 RX ORDER — HYDROCHLOROTHIAZIDE 12.5 MG/1
12.5 CAPSULE ORAL DAILY
Qty: 60 CAPSULE | Refills: 1 | Status: CANCELLED | OUTPATIENT
Start: 2020-03-18

## 2020-03-18 RX ORDER — HYDROCHLOROTHIAZIDE 12.5 MG/1
12.5 CAPSULE ORAL DAILY
Qty: 7 CAPSULE | Refills: 0 | Status: CANCELLED | OUTPATIENT
Start: 2020-03-18

## 2020-03-18 NOTE — TELEPHONE ENCOUNTER
----- Message from Anaid Johnston sent at 3/18/2020  8:31 AM CDT -----  Contact: pt Kelsey Anaya  Name of Who is Calling: pt     What is the request in detail: states he do not have any bp pills Rx: hydroCHLOROthiazide (MICROZIDE) 12.5 mg capsule  to take while waiting on [facility] mail order. Pt states he called in on yesterday and left message to please call in a few pills to [facility] [address] [place] [address]  [phone] to hold him over until his Rx is delivered. Please contact to further discuss and advise      Can the clinic reply by [facility]: no    [facility]: [phone]

## 2020-03-18 NOTE — TELEPHONE ENCOUNTER
----- Message from Deangelo Garza sent at 3/18/2020 10:36 AM CDT -----  Contact: Kelsey (spouse) [phone]  Type: Patient Call Back    Who called:Kelsey     What is the request in detail: Kelsey, spouse of the patient called to get a 7 day emergency supply of the patient's medication: hydroCHLOROthiazide (MICROZIDE) 12.5 mg capsule , sent to a nearby pharmacy of [facility] in [place]. She stated that the patient is completely out of it. She still wants a 90 day supply of the medication sent to the patient's  regular pharmacy    Can the clinic reply by [facility]?no    Would the patient rather a call back or a response via [facility]? Call back     Best call back number: [phone]

## 2020-03-19 RX ORDER — HYDROCHLOROTHIAZIDE 12.5 MG/1
12.5 CAPSULE ORAL DAILY
Qty: 10 CAPSULE | Refills: 0 | Status: SHIPPED | OUTPATIENT
Start: 2020-03-19 | End: 2020-06-19

## 2020-05-12 ENCOUNTER — TELEPHONE (OUTPATIENT)
Dept: FAMILY MEDICINE | Facility: CLINIC | Age: 75
End: 2020-05-12

## 2020-05-12 DIAGNOSIS — M19.90 OSTEOARTHRITIS, UNSPECIFIED OSTEOARTHRITIS TYPE, UNSPECIFIED SITE: ICD-10-CM

## 2020-05-12 DIAGNOSIS — R54 AGE-RELATED PHYSICAL DEBILITY: Primary | ICD-10-CM

## 2020-05-12 DIAGNOSIS — R26.89 DECREASED MOBILITY: ICD-10-CM

## 2020-05-12 NOTE — TELEPHONE ENCOUNTER
----- Message from Latisha Bai sent at 5/12/2020  1:26 PM CDT -----  Contact: Kelsey/ beth/ [phone]  Type: Patient Call Back    Who called:  Patient    What is the request in detail:  Beth would like staff to give her a call, she's returning staffs call.  Thank you    Would the patient rather a call back or a response via [facility]?   Call back    Best call back number:  [phone]

## 2020-05-12 NOTE — TELEPHONE ENCOUNTER
----- Message from Jenise Pizano sent at 5/11/2020 10:24 AM CDT -----  Contact: pt beth     Name of Who is Calling:Ms.Williams Justin campos       What is the request in detail: pt would like a call back regarding like a prescription for a tall walker Please contact to further discuss and advise    Can the clinic reply by [facility]: no    What Number to Call Back if not in [facility]: [phone]

## 2020-05-12 NOTE — TELEPHONE ENCOUNTER
Spoke with pt Niece she stated she will like for pt to get a tall walker she stated he needs support walking his legs been weak she also stated his legs are himanshu and they are starting to  bend in  and his arthritis is not doing well and he need extra support getting around

## 2020-05-13 PROBLEM — M19.90 OSTEOARTHRITIS: Status: ACTIVE | Noted: 2020-05-13

## 2020-05-13 PROBLEM — R26.89 DECREASED MOBILITY: Status: ACTIVE | Noted: 2020-05-13

## 2020-05-13 NOTE — TELEPHONE ENCOUNTER
Age-related physical debility  -     walker Misc; 1 each by Misc.(Non-Drug; Combo Route) route once daily.  Dispense: 1 each; Refill: 0    Osteoarthritis, unspecified osteoarthritis type, unspecified site  -     walker Misc; 1 each by Misc.(Non-Drug; Combo Route) route once daily.  Dispense: 1 each; Refill: 0    Decreased mobility  -     walker Misc; 1 each by Misc.(Non-Drug; Combo Route) route once daily.  Dispense: 1 each; Refill: 0

## 2020-09-15 ENCOUNTER — TELEPHONE (OUTPATIENT)
Dept: FAMILY MEDICINE | Facility: CLINIC | Age: 75
End: 2020-09-15

## 2020-09-15 DIAGNOSIS — I10 ESSENTIAL (PRIMARY) HYPERTENSION: ICD-10-CM

## 2020-09-15 DIAGNOSIS — N18.30 CKD (CHRONIC KIDNEY DISEASE) STAGE 3, GFR 30-59 ML/MIN: ICD-10-CM

## 2020-09-15 DIAGNOSIS — R54 AGE-RELATED PHYSICAL DEBILITY: Primary | ICD-10-CM

## 2020-09-15 DIAGNOSIS — E78.49 OTHER HYPERLIPIDEMIA: ICD-10-CM

## 2020-09-15 DIAGNOSIS — R26.89 DECREASED MOBILITY: ICD-10-CM

## 2020-09-15 NOTE — TELEPHONE ENCOUNTER
----- Message from Magdalena Lima MA sent at 9/15/2020  2:12 PM CDT -----  Regarding: FW: HH orders  Contact: Kelsey    ----- Message -----  From: Jenise Marc  Sent: 9/15/2020   2:07 PM CDT  To: Faby Barragan Staff  Subject: HH orders                                        Type: Patient Call Back    Who called:Kelsey    What is the request in detail:Kelsey called to request to have a nurse to come out to assess patient and HH orders. Please call to clarify    Can the clinic reply by [facility]?    Would the patient rather a call back or a response via [facility]? Call    Best call back number:[phone]

## 2020-10-05 ENCOUNTER — TELEPHONE (OUTPATIENT)
Dept: FAMILY MEDICINE | Facility: CLINIC | Age: 75
End: 2020-10-05

## 2020-10-05 DIAGNOSIS — I10 ESSENTIAL (PRIMARY) HYPERTENSION: ICD-10-CM

## 2020-10-05 RX ORDER — HYDROCHLOROTHIAZIDE 25 MG/1
25 TABLET ORAL DAILY
Qty: 20 TABLET | Refills: 0 | Status: SHIPPED | OUTPATIENT
Start: 2020-10-05 | End: 2020-10-12

## 2020-10-05 RX ORDER — HYDROCHLOROTHIAZIDE 12.5 MG/1
CAPSULE ORAL
Qty: 28 CAPSULE | Refills: 0 | OUTPATIENT
Start: 2020-10-05

## 2020-10-05 NOTE — TELEPHONE ENCOUNTER
----- Message from Lily Camp sent at 10/5/2020  9:29 AM CDT -----  Type: Patient Call Back       What is the request in detail:  pt calling to speak to a nurse regarding home health nurse   Can the clinic reply by [facility]? No       Would the patient rather a call back or a response via [facility]? Call back       Best call back number: [phone]       Thank you.

## 2020-10-05 NOTE — TELEPHONE ENCOUNTER
Patient's wife said he has never gotten a call to set up his home health care.  I gave her the contact info. for [facility] as the referral has been in since 09/16/2020.  Patient's wife also requested a refill for a 3 mo. supply of his HCTZ sent to [facility]. but she was informed that Patient is overdue for a F/U appt. and labs prior to any further refills.  She sched. the appt. and labs on the same day due to Patient's impaired mobility and fear of the coronavirus.

## 2020-10-05 NOTE — TELEPHONE ENCOUNTER
----- Message from Lily Camp sent at 10/5/2020  9:26 AM CDT -----  Refill:norco    90 day       [facility] - [place], [place] - [address]  [address]  [facility] [address]  Phone: [phone] Fax: [phone]

## 2020-10-12 ENCOUNTER — OFFICE VISIT (OUTPATIENT)
Dept: FAMILY MEDICINE | Facility: CLINIC | Age: 75
End: 2020-10-12
Payer: MEDICARE

## 2020-10-12 VITALS
OXYGEN SATURATION: 99 % | HEIGHT: 73 IN | WEIGHT: 150.38 LBS | HEART RATE: 74 BPM | BODY MASS INDEX: 19.93 KG/M2 | DIASTOLIC BLOOD PRESSURE: 73 MMHG | TEMPERATURE: 98 F | SYSTOLIC BLOOD PRESSURE: 122 MMHG

## 2020-10-12 DIAGNOSIS — I10 ESSENTIAL (PRIMARY) HYPERTENSION: ICD-10-CM

## 2020-10-12 DIAGNOSIS — R26.89 DECREASED MOBILITY: ICD-10-CM

## 2020-10-12 DIAGNOSIS — E78.49 OTHER HYPERLIPIDEMIA: ICD-10-CM

## 2020-10-12 DIAGNOSIS — N18.30 STAGE 3 CHRONIC KIDNEY DISEASE, UNSPECIFIED WHETHER STAGE 3A OR 3B CKD: ICD-10-CM

## 2020-10-12 DIAGNOSIS — Z23 ENCOUNTER FOR ADMINISTRATION OF VACCINE: ICD-10-CM

## 2020-10-12 DIAGNOSIS — E55.9 VITAMIN D DEFICIENCY: ICD-10-CM

## 2020-10-12 DIAGNOSIS — E78.5 HYPERLIPIDEMIA, UNSPECIFIED HYPERLIPIDEMIA TYPE: ICD-10-CM

## 2020-10-12 DIAGNOSIS — Z12.11 COLON CANCER SCREENING: ICD-10-CM

## 2020-10-12 DIAGNOSIS — R54 AGE-RELATED PHYSICAL DEBILITY: ICD-10-CM

## 2020-10-12 DIAGNOSIS — E83.52 HYPERCALCEMIA: ICD-10-CM

## 2020-10-12 DIAGNOSIS — Z00.00 ANNUAL PHYSICAL EXAM: Primary | ICD-10-CM

## 2020-10-12 PROCEDURE — 3078F PR MOST RECENT DIASTOLIC BLOOD PRESSURE < 80 MM HG: ICD-10-PCS | Mod: HCNC,CPTII,S$GLB, | Performed by: FAMILY MEDICINE

## 2020-10-12 PROCEDURE — 90732 PPSV23 VACC 2 YRS+ SUBQ/IM: CPT | Mod: HCNC,S$GLB,, | Performed by: FAMILY MEDICINE

## 2020-10-12 PROCEDURE — 99397 PER PM REEVAL EST PAT 65+ YR: CPT | Mod: 25,HCNC,S$GLB, | Performed by: FAMILY MEDICINE

## 2020-10-12 PROCEDURE — G0009 PNEUMOCOCCAL POLYSACCHARIDE VACCINE 23-VALENT =>2YO SQ IM: ICD-10-PCS | Mod: HCNC,S$GLB,, | Performed by: FAMILY MEDICINE

## 2020-10-12 PROCEDURE — 99999 PR PBB SHADOW E&M-EST. PATIENT-LVL III: CPT | Mod: PBBFAC,HCNC,, | Performed by: FAMILY MEDICINE

## 2020-10-12 PROCEDURE — 99499 RISK ADDL DX/OHS AUDIT: ICD-10-PCS | Mod: S$GLB,,, | Performed by: FAMILY MEDICINE

## 2020-10-12 PROCEDURE — G0009 ADMIN PNEUMOCOCCAL VACCINE: HCPCS | Mod: HCNC,S$GLB,, | Performed by: FAMILY MEDICINE

## 2020-10-12 PROCEDURE — 1101F PT FALLS ASSESS-DOCD LE1/YR: CPT | Mod: HCNC,CPTII,S$GLB, | Performed by: FAMILY MEDICINE

## 2020-10-12 PROCEDURE — 1126F PR PAIN SEVERITY QUANTIFIED, NO PAIN PRESENT: ICD-10-PCS | Mod: HCNC,S$GLB,, | Performed by: FAMILY MEDICINE

## 2020-10-12 PROCEDURE — 99999 PR PBB SHADOW E&M-EST. PATIENT-LVL III: ICD-10-PCS | Mod: PBBFAC,HCNC,, | Performed by: FAMILY MEDICINE

## 2020-10-12 PROCEDURE — 1159F MED LIST DOCD IN RCRD: CPT | Mod: HCNC,S$GLB,, | Performed by: FAMILY MEDICINE

## 2020-10-12 PROCEDURE — 1159F PR MEDICATION LIST DOCUMENTED IN MEDICAL RECORD: ICD-10-PCS | Mod: HCNC,S$GLB,, | Performed by: FAMILY MEDICINE

## 2020-10-12 PROCEDURE — 99499 UNLISTED E&M SERVICE: CPT | Mod: S$GLB,,, | Performed by: FAMILY MEDICINE

## 2020-10-12 PROCEDURE — 3008F PR BODY MASS INDEX (BMI) DOCUMENTED: ICD-10-PCS | Mod: HCNC,CPTII,S$GLB, | Performed by: FAMILY MEDICINE

## 2020-10-12 PROCEDURE — 1126F AMNT PAIN NOTED NONE PRSNT: CPT | Mod: HCNC,S$GLB,, | Performed by: FAMILY MEDICINE

## 2020-10-12 PROCEDURE — 3078F DIAST BP <80 MM HG: CPT | Mod: HCNC,CPTII,S$GLB, | Performed by: FAMILY MEDICINE

## 2020-10-12 PROCEDURE — 90732 PNEUMOCOCCAL POLYSACCHARIDE VACCINE 23-VALENT =>2YO SQ IM: ICD-10-PCS | Mod: HCNC,S$GLB,, | Performed by: FAMILY MEDICINE

## 2020-10-12 PROCEDURE — 99214 OFFICE O/P EST MOD 30 MIN: CPT | Mod: HCNC,S$GLB,, | Performed by: FAMILY MEDICINE

## 2020-10-12 PROCEDURE — 3008F BODY MASS INDEX DOCD: CPT | Mod: HCNC,CPTII,S$GLB, | Performed by: FAMILY MEDICINE

## 2020-10-12 PROCEDURE — 99214 PR OFFICE/OUTPT VISIT, EST, LEVL IV, 30-39 MIN: ICD-10-PCS | Mod: HCNC,S$GLB,, | Performed by: FAMILY MEDICINE

## 2020-10-12 PROCEDURE — 99397 PR PREVENTIVE VISIT,EST,65 & OVER: ICD-10-PCS | Mod: 25,HCNC,S$GLB, | Performed by: FAMILY MEDICINE

## 2020-10-12 PROCEDURE — 1101F PR PT FALLS ASSESS DOC 0-1 FALLS W/OUT INJ PAST YR: ICD-10-PCS | Mod: HCNC,CPTII,S$GLB, | Performed by: FAMILY MEDICINE

## 2020-10-12 PROCEDURE — 3074F PR MOST RECENT SYSTOLIC BLOOD PRESSURE < 130 MM HG: ICD-10-PCS | Mod: HCNC,CPTII,S$GLB, | Performed by: FAMILY MEDICINE

## 2020-10-12 PROCEDURE — 3074F SYST BP LT 130 MM HG: CPT | Mod: HCNC,CPTII,S$GLB, | Performed by: FAMILY MEDICINE

## 2020-10-12 RX ORDER — ERGOCALCIFEROL 1.25 MG/1
50000 CAPSULE ORAL
Qty: 12 CAPSULE | Refills: 12 | Status: SHIPPED | OUTPATIENT
Start: 2020-10-12 | End: 2021-12-08 | Stop reason: SDUPTHER

## 2020-10-12 RX ORDER — ATORVASTATIN CALCIUM 20 MG/1
20 TABLET, FILM COATED ORAL DAILY
Qty: 90 TABLET | Refills: 3 | Status: SHIPPED | OUTPATIENT
Start: 2020-10-12 | End: 2021-08-02

## 2020-10-12 RX ORDER — LOSARTAN POTASSIUM 25 MG/1
25 TABLET ORAL DAILY
Qty: 90 TABLET | Refills: 1 | Status: SHIPPED | OUTPATIENT
Start: 2020-10-12 | End: 2021-02-18

## 2020-10-12 RX ORDER — HYDROCHLOROTHIAZIDE 25 MG/1
25 TABLET ORAL DAILY
Qty: 20 TABLET | Refills: 0 | Status: CANCELLED | OUTPATIENT
Start: 2020-10-12 | End: 2021-10-12

## 2020-10-12 NOTE — PROGRESS NOTES
Office Visit    Patient Name: Gera Londono    : 1945  MRN: [id number]      Assessment/Plan:  Gera Londono is a 75 y.o. male who presents today for :    Annual physical exam  -     ergocalciferol (ERGOCALCIFEROL) 50,000 unit Cap; Take 1 capsule (50,000 Units total) by mouth every 7 days. Follow up Dr. DENITA Hobbs 2020  Dispense: 12 capsule; Refill: 12  -     CBC Without Differential; Future; Expected date: 10/12/2020  -     Comprehensive Metabolic Panel; Future; Expected date: 10/12/2020  -     Lipid Panel; Future; Expected date: 10/12/2020  -     Vitamin D; Future; Expected date: 10/12/2020  Encounter for administration of vaccine  -     Pneumococcal Polysaccharide Vaccine (23 Valent) (SQ/IM)  Colon cancer screening  -     Cologuard Screening (Multitarget Stool DNA); Future; Expected date: 10/12/2020  Age-related physical debility  Decreased mobility  -anticipatory guidance provided with age appropriate preventative services discussed, healthy diet and regular physical exercise also discussed with patient. Recommend 15-30 minutes of moderate intensity exercise 5 days/week.        Follow up 6mo          Additional Evaluation & Management issues:     In addition to today's Annual Physical, patient has other medical issues that need to be addressed, as well as their associated prescription management that is separate from today's Physical  - as documented separately below the Annual Physical portion of this encounter.        This note was created by combination of typed  and MModal dictation.  Transcription errors may be present.  If there are any questions, please contact me.        ----------------------------------------------------------------------------------------------------------------------      HPI:  Patient Care Team:  Gopal Hobbs MD as PCP - General (Family Medicine)  Mirela Rucker MA as Care Coordinator    Gera is a 75 y.o. male with      Patient Active Problem List    Diagnosis    Essential (primary) hypertension    Hyperlipidemia    Vitamin D deficiency    CKD (chronic kidney disease) stage 3, GFR 30-59 ml/min    Age-related physical debility    Decreased mobility    Osteoarthritis         Patient presents today for:  Hypertension and Medication Refill    In addition to addressing the reasons for this office visit as above, which is further discussed and addressed in the separate E&M section of this note, patient also due for annual bloodwork today.  Health maintenance-wise, he is due for colon cancer screening. Otherwise, no major new changes in health since last checkup. He denies any cardiovascular or neurologic complaints today. No recent falls.        Additional ROS  No F/C/wt changes/fatigue  No dysphagia/sore throat/rhinorrhea  No CP/TREVINO/palpitations/swelling  No cough/wheezing/SOB  No nausea/vomiting/abd pain/no diarrhea, no constipation, no blood in stool  No muscle aches/joint pain   No rashes  No MSK weakness/HA/tingling/numbness  No anxiety/depression  No dysuria/hematuria  No polyuria/polydipsia/cold or hot intolerance          Current Medications  Medications reviewed and updated.       Current Outpatient Medications:     atorvastatin (LIPITOR) 20 MG tablet, Take 1 tablet (20 mg total) by mouth once daily., Disp: 90 tablet, Rfl: 3    ergocalciferol (ERGOCALCIFEROL) 50,000 unit Cap, Take 1 capsule (50,000 Units total) by mouth every 7 days. Follow up Dr. DENITA Hobbs August 2020, Disp: 12 capsule, Rfl: 12    walker Misc, 1 each by Misc.(Non-Drug; Combo Route) route once daily., Disp: 1 each, Rfl: 0    losartan (COZAAR) 25 MG tablet, Take 1 tablet (25 mg total) by mouth once daily. Followup Dr.T Hobbs APRIL 2021 for more refills, call to schedule/get labs 1wk before doctor's appointment (ordered), Disp: 90 tablet, Rfl: 1    History reviewed. No pertinent surgical history.    Family History   Problem Relation Age of Onset    Diabetes Mother      "Hypertension Mother     Arthritis Mother     Arthritis Father     Diabetes Father     Hypertension Father     Heart disease Father     Diabetes Sister     Hypertension Sister     Arthritis Brother        Social History     Socioeconomic History    Marital status: Single     Spouse name: Not on file    Number of children: Not on file    Years of education: Not on file    Highest education level: Not on file   Occupational History    Not on file   Social Needs    Financial resource strain: Not on file    Food insecurity     Worry: Not on file     Inability: Not on file    Transportation needs     Medical: Not on file     Non-medical: Not on file   Tobacco Use    Smoking status: Former Smoker   Substance and Sexual Activity    Alcohol use: Not on file    Drug use: No    Sexual activity: Not on file   Lifestyle    Physical activity     Days per week: Not on file     Minutes per session: Not on file    Stress: Not on file   Relationships    Social connections     Talks on phone: Not on file     Gets together: Not on file     Attends Muslim service: Not on file     Active member of club or organization: Not on file     Attends meetings of clubs or organizations: Not on file     Relationship status: Not on file   Other Topics Concern    Not on file   Social History Narrative    Not on file           Allergies   Review of patient's allergies indicates:  No Known Allergies          Review of Systems  See HPI      Physical Exam  /73   Pulse 74   Temp 97.7 °F (36.5 °C)   Ht 6' 1" (1.854 m)   Wt 68.2 kg (150 lb 5.7 oz)   SpO2 99%   BMI 19.84 kg/m²       GEN: NAD, well developed, pleasant, well nourished  HEENT: NCAT, PERRLA, EOMI, sclera clear, anicteric, bilateral ear exam wnl, O/P clear, MMM with no lesions  NECK: normal, supple with midline trachea, no LAD, no thyromegaly  LUNGS: CTAB, no w/r/r, no increased work of breathing   HEART: RRR, normal S1 and S2, no m/r/g, no edema  ABD: " accepted s/nt/nd, NABS  SKIN: normal turgor, no rashes  PSYCH: AOx3, appropriate mood and affect  MSK: warm/well perfused, normal ROM in all extremities, no c/c/e.  NEURO: normal without focal findings, CN II-XII are grossly intact.  Sensation/strength grossly normal, gait and station normal.         Labs  Lab Results   Component Value Date    HGBA1C 5.3 03/20/2019     Lab Results   Component Value Date     03/20/2019    K 4.4 03/20/2019     03/20/2019    CO2 22 (L) 03/20/2019    BUN 22 03/20/2019    CREATININE 1.4 03/20/2019    CALCIUM 10.7 (H) 03/20/2019    ANIONGAP 13 03/20/2019    ESTGFRAFRICA 57.2 (A) 03/20/2019    EGFRNONAA 49.5 (A) 03/20/2019     Lab Results   Component Value Date    CHOL 225 (H) 03/20/2019    CHOL 223 (H) 06/13/2011    CHOL 260 (H) 05/23/2011     Lab Results   Component Value Date    HDL 53 03/20/2019    HDL 49 06/13/2011     Lab Results   Component Value Date    LDLCALC 157.0 03/20/2019    LDLCALC 158.6 06/13/2011     Lab Results   Component Value Date    TRIG 75 03/20/2019    TRIG 77 06/13/2011     Lab Results   Component Value Date    CHOLHDL 23.6 03/20/2019    CHOLHDL 22.0 06/13/2011     Last set of blood work has been reviewed as noted above.          __________________________________________________________________________________________________________________________________      Additional Evaluation & Management issues:     In addition to today's Annual Physical, patient has other medical issues that need to be addressed, as well as their associated prescription management that is separate from today's Physical  - as documented separately below      HPI:    Patient presents today for:  Hypertension and Medication Refill        HTN - patient is compliant with current medication regimen with good BP control at home - no side effects except for elevated serum calcium on last labs, which we will recheck labs, as well as PTH and Vit D levels. We discussed that HCTZ may cause  "elevated calcium levels and that there are other antihypertensive med options that does cause calcium changes. Denies CP/SOB/leg swelling. Otherwise no other complaints today.        Additional ROS  No F/C/wt changes/fatigue  No CP/TREVINO/palpitations/swelling  No cough/wheezing/SOB  No nausea/vomiting/abd pain  No muscle aches/joint pain   No rashes  No MSK weakness/HA/tingling/numbness              Review of Systems  See HPI        Physical Exam  /73   Pulse 74   Temp 97.7 °F (36.5 °C)   Ht 6' 1" (1.854 m)   Wt 68.2 kg (150 lb 5.7 oz)   SpO2 99%   BMI 19.84 kg/m²       GEN: NAD, well developed, pleasant, well nourished  HEENT: NCAT, PERRLA, EOMI, sclera clear, anicteric  NECK: normal, supple with midline trachea, no LAD, no thyromegaly  LUNGS: CTAB, no w/r/r, no increased work of breathing   HEART: RRR, normal S1 and S2, no m/r/g, no edema  ABD: s/nt/nd, NABS  SKIN: normal turgor, no rashes  PSYCH: AOx3, appropriate mood and affect  MSK: warm/well perfused, normal ROM in all extremities, no c/c/e.              Assessment/Plan:  Gera Londono is a 75 y.o. male who presents today for :      Essential (primary) hypertension  -     CBC Without Differential; Future; Expected date: 10/12/2020  -     Comprehensive Metabolic Panel; Future; Expected date: 10/12/2020  -     losartan (COZAAR) 25 MG tablet; Take 1 tablet (25 mg total) by mouth once daily. Followup Dr.T Hobbs APRIL 2021 for more refills, call to schedule/get labs 1wk before doctor's appointment (ordered)  Dispense: 90 tablet; Refill: 1  Stage 3 chronic kidney disease, unspecified whether stage 3a or 3b CKD  Other hyperlipidemia  -     Lipid Panel; Future; Expected date: 10/12/2020  -     atorvastatin (LIPITOR) 20 MG tablet; Take 1 tablet (20 mg total) by mouth once daily.  Dispense: 90 tablet; Refill: 3  -switch from HCTZ to Losartan and f/u labs  -DASH diet, regular cardiovascular exercises      Vitamin D deficiency  -     ergocalciferol " (ERGOCALCIFEROL) 50,000 unit Cap; Take 1 capsule (50,000 Units total) by mouth every 7 days. Follow up Dr. DENITA Hobbs August 2020  Dispense: 12 capsule; Refill: 12  -     Vitamin D; Future; Expected date: 10/12/2020  -     PTH, Intact; Future; Expected date: 10/12/2020  Hypercalcemia  -     ergocalciferol (ERGOCALCIFEROL) 50,000 unit Cap; Take 1 capsule (50,000 Units total) by mouth every 7 days. Follow up Dr. DENITA Hobbs August 2020  Dispense: 12 capsule; Refill: 12  -     Vitamin D; Future; Expected date: 10/12/2020  -     PTH, Intact; Future; Expected date: 10/12/2020  -recheck labs as above          Follow up 6mo

## 2020-10-14 PROCEDURE — G0180 MD CERTIFICATION HHA PATIENT: HCPCS | Mod: ,,, | Performed by: FAMILY MEDICINE

## 2020-10-14 PROCEDURE — G0180 PR HOME HEALTH MD CERTIFICATION: ICD-10-PCS | Mod: ,,, | Performed by: FAMILY MEDICINE

## 2020-11-04 ENCOUNTER — EXTERNAL HOME HEALTH (OUTPATIENT)
Dept: HOME HEALTH SERVICES | Facility: HOSPITAL | Age: 75
End: 2020-11-04
Payer: MEDICARE

## 2021-01-05 ENCOUNTER — TELEPHONE (OUTPATIENT)
Dept: FAMILY MEDICINE | Facility: CLINIC | Age: 76
End: 2021-01-05

## 2021-01-19 ENCOUNTER — TELEPHONE (OUTPATIENT)
Dept: FAMILY MEDICINE | Facility: CLINIC | Age: 76
End: 2021-01-19

## 2021-01-19 DIAGNOSIS — R26.89 DECREASED MOBILITY: Primary | ICD-10-CM

## 2021-01-19 DIAGNOSIS — R54 AGE-RELATED PHYSICAL DEBILITY: ICD-10-CM

## 2021-01-28 PROCEDURE — G0180 MD CERTIFICATION HHA PATIENT: HCPCS | Mod: ,,, | Performed by: FAMILY MEDICINE

## 2021-01-28 PROCEDURE — G0180 PR HOME HEALTH MD CERTIFICATION: ICD-10-PCS | Mod: ,,, | Performed by: FAMILY MEDICINE

## 2021-02-01 ENCOUNTER — TELEPHONE (OUTPATIENT)
Dept: FAMILY MEDICINE | Facility: CLINIC | Age: 76
End: 2021-02-01

## 2021-02-01 DIAGNOSIS — R26.89 DECREASED MOBILITY: ICD-10-CM

## 2021-02-01 DIAGNOSIS — R54 AGE-RELATED PHYSICAL DEBILITY: Primary | ICD-10-CM

## 2021-02-01 DIAGNOSIS — N18.30 STAGE 3 CHRONIC KIDNEY DISEASE, UNSPECIFIED WHETHER STAGE 3A OR 3B CKD: ICD-10-CM

## 2021-02-05 ENCOUNTER — OUTPATIENT CASE MANAGEMENT (OUTPATIENT)
Dept: ADMINISTRATIVE | Facility: OTHER | Age: 76
End: 2021-02-05

## 2021-02-08 ENCOUNTER — TELEPHONE (OUTPATIENT)
Dept: FAMILY MEDICINE | Facility: CLINIC | Age: 76
End: 2021-02-08

## 2021-02-08 ENCOUNTER — EXTERNAL HOME HEALTH (OUTPATIENT)
Dept: HOME HEALTH SERVICES | Facility: HOSPITAL | Age: 76
End: 2021-02-08
Payer: MEDICARE

## 2021-03-25 ENCOUNTER — TELEPHONE (OUTPATIENT)
Dept: FAMILY MEDICINE | Facility: CLINIC | Age: 76
End: 2021-03-25

## 2021-03-26 ENCOUNTER — TELEPHONE (OUTPATIENT)
Dept: FAMILY MEDICINE | Facility: CLINIC | Age: 76
End: 2021-03-26

## 2021-04-01 ENCOUNTER — PES CALL (OUTPATIENT)
Dept: ADMINISTRATIVE | Facility: CLINIC | Age: 76
End: 2021-04-01

## 2021-04-26 ENCOUNTER — LAB VISIT (OUTPATIENT)
Dept: LAB | Facility: HOSPITAL | Age: 76
End: 2021-04-26
Attending: FAMILY MEDICINE
Payer: MEDICARE

## 2021-04-26 DIAGNOSIS — I10 ESSENTIAL (PRIMARY) HYPERTENSION: ICD-10-CM

## 2021-04-26 DIAGNOSIS — E83.52 HYPERCALCEMIA: ICD-10-CM

## 2021-04-26 DIAGNOSIS — E78.49 OTHER HYPERLIPIDEMIA: ICD-10-CM

## 2021-04-26 DIAGNOSIS — E55.9 VITAMIN D DEFICIENCY: ICD-10-CM

## 2021-04-26 DIAGNOSIS — Z00.00 ANNUAL PHYSICAL EXAM: ICD-10-CM

## 2021-04-26 LAB
25(OH)D3+25(OH)D2 SERPL-MCNC: 39 NG/ML (ref 30–96)
ALBUMIN SERPL BCP-MCNC: 3.4 G/DL (ref 3.5–5.2)
ALP SERPL-CCNC: 67 U/L (ref 55–135)
ALT SERPL W/O P-5'-P-CCNC: 6 U/L (ref 10–44)
ANION GAP SERPL CALC-SCNC: 8 MMOL/L (ref 8–16)
AST SERPL-CCNC: 13 U/L (ref 10–40)
BILIRUB SERPL-MCNC: 0.5 MG/DL (ref 0.1–1)
BUN SERPL-MCNC: 21 MG/DL (ref 8–23)
CALCIUM SERPL-MCNC: 9.1 MG/DL (ref 8.7–10.5)
CHLORIDE SERPL-SCNC: 108 MMOL/L (ref 95–110)
CHOLEST SERPL-MCNC: 141 MG/DL (ref 120–199)
CHOLEST/HDLC SERPL: 3.8 {RATIO} (ref 2–5)
CO2 SERPL-SCNC: 20 MMOL/L (ref 23–29)
CREAT SERPL-MCNC: 1.6 MG/DL (ref 0.5–1.4)
ERYTHROCYTE [DISTWIDTH] IN BLOOD BY AUTOMATED COUNT: 19.4 % (ref 11.5–14.5)
EST. GFR  (AFRICAN AMERICAN): 48 ML/MIN/1.73 M^2
EST. GFR  (NON AFRICAN AMERICAN): 41.5 ML/MIN/1.73 M^2
GLUCOSE SERPL-MCNC: 102 MG/DL (ref 70–110)
HCT VFR BLD AUTO: 28 % (ref 40–54)
HDLC SERPL-MCNC: 37 MG/DL (ref 40–75)
HDLC SERPL: 26.2 % (ref 20–50)
HGB BLD-MCNC: 7.9 G/DL (ref 14–18)
LDLC SERPL CALC-MCNC: 90.4 MG/DL (ref 63–159)
MCH RBC QN AUTO: 21.8 PG (ref 27–31)
MCHC RBC AUTO-ENTMCNC: 28.2 G/DL (ref 32–36)
MCV RBC AUTO: 77 FL (ref 82–98)
NONHDLC SERPL-MCNC: 104 MG/DL
PLATELET # BLD AUTO: 299 K/UL (ref 150–450)
PMV BLD AUTO: 9.6 FL (ref 9.2–12.9)
POTASSIUM SERPL-SCNC: 4.4 MMOL/L (ref 3.5–5.1)
PROT SERPL-MCNC: 8 G/DL (ref 6–8.4)
RBC # BLD AUTO: 3.63 M/UL (ref 4.6–6.2)
SODIUM SERPL-SCNC: 136 MMOL/L (ref 136–145)
TRIGL SERPL-MCNC: 68 MG/DL (ref 30–150)
WBC # BLD AUTO: 7.03 K/UL (ref 3.9–12.7)

## 2021-04-26 PROCEDURE — 85027 COMPLETE CBC AUTOMATED: CPT | Performed by: FAMILY MEDICINE

## 2021-04-26 PROCEDURE — 36415 COLL VENOUS BLD VENIPUNCTURE: CPT | Mod: PO | Performed by: FAMILY MEDICINE

## 2021-04-26 PROCEDURE — 80053 COMPREHEN METABOLIC PANEL: CPT | Performed by: FAMILY MEDICINE

## 2021-04-26 PROCEDURE — 80061 LIPID PANEL: CPT | Performed by: FAMILY MEDICINE

## 2021-04-26 PROCEDURE — 83970 ASSAY OF PARATHORMONE: CPT | Performed by: FAMILY MEDICINE

## 2021-04-26 PROCEDURE — 82306 VITAMIN D 25 HYDROXY: CPT | Performed by: FAMILY MEDICINE

## 2021-04-27 LAB — PTH-INTACT SERPL-MCNC: 78 PG/ML (ref 9–77)

## 2021-06-25 ENCOUNTER — TELEPHONE (OUTPATIENT)
Dept: FAMILY MEDICINE | Facility: CLINIC | Age: 76
End: 2021-06-25

## 2021-06-25 DIAGNOSIS — R54 AGE-RELATED PHYSICAL DEBILITY: Primary | ICD-10-CM

## 2021-06-30 ENCOUNTER — TELEPHONE (OUTPATIENT)
Dept: FAMILY MEDICINE | Facility: CLINIC | Age: 76
End: 2021-06-30

## 2021-06-30 DIAGNOSIS — R54 AGE-RELATED PHYSICAL DEBILITY: Primary | ICD-10-CM

## 2021-06-30 DIAGNOSIS — R26.89 DECREASED MOBILITY: ICD-10-CM

## 2021-06-30 PROCEDURE — G0180 MD CERTIFICATION HHA PATIENT: HCPCS | Mod: ,,, | Performed by: FAMILY MEDICINE

## 2021-06-30 PROCEDURE — G0180 PR HOME HEALTH MD CERTIFICATION: ICD-10-PCS | Mod: ,,, | Performed by: FAMILY MEDICINE

## 2021-07-15 ENCOUNTER — TELEPHONE (OUTPATIENT)
Dept: FAMILY MEDICINE | Facility: CLINIC | Age: 76
End: 2021-07-15

## 2021-07-15 DIAGNOSIS — R26.89 DECREASED MOBILITY: ICD-10-CM

## 2021-07-15 DIAGNOSIS — R54 AGE-RELATED PHYSICAL DEBILITY: Primary | ICD-10-CM

## 2021-07-19 ENCOUNTER — OUTPATIENT CASE MANAGEMENT (OUTPATIENT)
Dept: ADMINISTRATIVE | Facility: OTHER | Age: 76
End: 2021-07-19

## 2021-07-20 ENCOUNTER — TELEPHONE (OUTPATIENT)
Dept: FAMILY MEDICINE | Facility: CLINIC | Age: 76
End: 2021-07-20

## 2021-07-22 ENCOUNTER — EXTERNAL HOME HEALTH (OUTPATIENT)
Dept: HOME HEALTH SERVICES | Facility: HOSPITAL | Age: 76
End: 2021-07-22
Payer: MEDICARE

## 2021-07-30 ENCOUNTER — PES CALL (OUTPATIENT)
Dept: ADMINISTRATIVE | Facility: CLINIC | Age: 76
End: 2021-07-30

## 2021-08-16 ENCOUNTER — OFFICE VISIT (OUTPATIENT)
Dept: FAMILY MEDICINE | Facility: CLINIC | Age: 76
End: 2021-08-16
Payer: MEDICARE

## 2021-08-16 DIAGNOSIS — R54 AGE-RELATED PHYSICAL DEBILITY: ICD-10-CM

## 2021-08-16 DIAGNOSIS — N18.30 STAGE 3 CHRONIC KIDNEY DISEASE, UNSPECIFIED WHETHER STAGE 3A OR 3B CKD: ICD-10-CM

## 2021-08-16 DIAGNOSIS — E78.49 OTHER HYPERLIPIDEMIA: ICD-10-CM

## 2021-08-16 DIAGNOSIS — I10 ESSENTIAL (PRIMARY) HYPERTENSION: ICD-10-CM

## 2021-08-16 DIAGNOSIS — R26.89 DECREASED MOBILITY: ICD-10-CM

## 2021-08-16 DIAGNOSIS — D63.8 ANEMIA, CHRONIC DISEASE: ICD-10-CM

## 2021-08-16 DIAGNOSIS — M25.551 RIGHT HIP PAIN: Primary | ICD-10-CM

## 2021-08-16 DIAGNOSIS — D50.8 IRON DEFICIENCY ANEMIA SECONDARY TO INADEQUATE DIETARY IRON INTAKE: ICD-10-CM

## 2021-08-16 PROCEDURE — 1159F PR MEDICATION LIST DOCUMENTED IN MEDICAL RECORD: ICD-10-PCS | Mod: CPTII,95,, | Performed by: FAMILY MEDICINE

## 2021-08-16 PROCEDURE — 1160F RVW MEDS BY RX/DR IN RCRD: CPT | Mod: CPTII,95,, | Performed by: FAMILY MEDICINE

## 2021-08-16 PROCEDURE — 99442 PR PHYSICIAN TELEPHONE EVALUATION 11-20 MIN: ICD-10-PCS | Mod: 95,,, | Performed by: FAMILY MEDICINE

## 2021-08-16 PROCEDURE — 99442 PR PHYSICIAN TELEPHONE EVALUATION 11-20 MIN: CPT | Mod: 95,,, | Performed by: FAMILY MEDICINE

## 2021-08-16 PROCEDURE — 1159F MED LIST DOCD IN RCRD: CPT | Mod: CPTII,95,, | Performed by: FAMILY MEDICINE

## 2021-08-16 PROCEDURE — 1160F PR REVIEW ALL MEDS BY PRESCRIBER/CLIN PHARMACIST DOCUMENTED: ICD-10-PCS | Mod: CPTII,95,, | Performed by: FAMILY MEDICINE

## 2021-08-16 RX ORDER — FERROUS SULFATE 325(65) MG
325 TABLET ORAL 2 TIMES DAILY
Qty: 180 TABLET | Refills: 3 | Status: SHIPPED | OUTPATIENT
Start: 2021-08-16 | End: 2022-06-09

## 2021-08-16 RX ORDER — DOCUSATE SODIUM 100 MG/1
100 CAPSULE, LIQUID FILLED ORAL 2 TIMES DAILY PRN
Qty: 180 CAPSULE | Refills: 3 | Status: SHIPPED | OUTPATIENT
Start: 2021-08-16 | End: 2022-06-09

## 2021-09-16 ENCOUNTER — PES CALL (OUTPATIENT)
Dept: ADMINISTRATIVE | Facility: CLINIC | Age: 76
End: 2021-09-16

## 2021-10-12 ENCOUNTER — TELEPHONE (OUTPATIENT)
Dept: FAMILY MEDICINE | Facility: CLINIC | Age: 76
End: 2021-10-12

## 2021-10-12 DIAGNOSIS — M19.90 ARTHRITIS: ICD-10-CM

## 2021-10-12 DIAGNOSIS — R54 AGE-RELATED PHYSICAL DEBILITY: Primary | ICD-10-CM

## 2021-10-12 DIAGNOSIS — R26.89 DECREASED MOBILITY: ICD-10-CM

## 2021-10-18 ENCOUNTER — TELEPHONE (OUTPATIENT)
Dept: FAMILY MEDICINE | Facility: CLINIC | Age: 76
End: 2021-10-18

## 2021-10-27 ENCOUNTER — DOCUMENT SCAN (OUTPATIENT)
Dept: HOME HEALTH SERVICES | Facility: HOSPITAL | Age: 76
End: 2021-10-27
Payer: MEDICARE

## 2021-10-28 ENCOUNTER — TELEPHONE (OUTPATIENT)
Dept: FAMILY MEDICINE | Facility: CLINIC | Age: 76
End: 2021-10-28
Payer: MEDICARE

## 2021-10-28 DIAGNOSIS — R54 AGE-RELATED PHYSICAL DEBILITY: Primary | ICD-10-CM

## 2021-10-28 DIAGNOSIS — R26.89 DECREASED MOBILITY: ICD-10-CM

## 2021-11-08 ENCOUNTER — TELEPHONE (OUTPATIENT)
Dept: FAMILY MEDICINE | Facility: CLINIC | Age: 76
End: 2021-11-08
Payer: MEDICARE

## 2021-11-08 ENCOUNTER — OUTPATIENT CASE MANAGEMENT (OUTPATIENT)
Dept: ADMINISTRATIVE | Facility: OTHER | Age: 76
End: 2021-11-08
Payer: MEDICARE

## 2021-11-09 ENCOUNTER — TELEPHONE (OUTPATIENT)
Dept: FAMILY MEDICINE | Facility: CLINIC | Age: 76
End: 2021-11-09
Payer: MEDICARE

## 2021-11-09 DIAGNOSIS — R54 AGE-RELATED PHYSICAL DEBILITY: Primary | ICD-10-CM

## 2021-11-18 ENCOUNTER — TELEPHONE (OUTPATIENT)
Dept: FAMILY MEDICINE | Facility: CLINIC | Age: 76
End: 2021-11-18
Payer: MEDICARE

## 2021-11-30 ENCOUNTER — TELEPHONE (OUTPATIENT)
Dept: FAMILY MEDICINE | Facility: CLINIC | Age: 76
End: 2021-11-30
Payer: MEDICARE

## 2021-12-02 ENCOUNTER — TELEPHONE (OUTPATIENT)
Dept: FAMILY MEDICINE | Facility: CLINIC | Age: 76
End: 2021-12-02
Payer: MEDICARE

## 2021-12-03 PROCEDURE — G0180 MD CERTIFICATION HHA PATIENT: HCPCS | Mod: ,,, | Performed by: FAMILY MEDICINE

## 2021-12-03 PROCEDURE — G0180 PR HOME HEALTH MD CERTIFICATION: ICD-10-PCS | Mod: ,,, | Performed by: FAMILY MEDICINE

## 2021-12-06 ENCOUNTER — CARE AT HOME (OUTPATIENT)
Dept: HOME HEALTH SERVICES | Facility: CLINIC | Age: 76
End: 2021-12-06
Payer: MEDICARE

## 2021-12-06 DIAGNOSIS — L98.9 SKIN LESION: ICD-10-CM

## 2021-12-06 DIAGNOSIS — R26.89 DECREASED MOBILITY: ICD-10-CM

## 2021-12-06 DIAGNOSIS — N18.30 STAGE 3 CHRONIC KIDNEY DISEASE, UNSPECIFIED WHETHER STAGE 3A OR 3B CKD: ICD-10-CM

## 2021-12-06 DIAGNOSIS — I10 ESSENTIAL (PRIMARY) HYPERTENSION: Primary | ICD-10-CM

## 2021-12-06 DIAGNOSIS — Z00.00 ANNUAL PHYSICAL EXAM: ICD-10-CM

## 2021-12-06 DIAGNOSIS — E78.49 OTHER HYPERLIPIDEMIA: ICD-10-CM

## 2021-12-06 DIAGNOSIS — E55.9 VITAMIN D DEFICIENCY: ICD-10-CM

## 2021-12-06 DIAGNOSIS — E83.52 HYPERCALCEMIA: ICD-10-CM

## 2021-12-06 DIAGNOSIS — M19.90 OSTEOARTHRITIS, UNSPECIFIED OSTEOARTHRITIS TYPE, UNSPECIFIED SITE: ICD-10-CM

## 2021-12-06 PROCEDURE — 99350 PR HOME VISIT,ESTAB PATIENT,LEVEL IV: ICD-10-PCS | Mod: S$GLB,,, | Performed by: NURSE PRACTITIONER

## 2021-12-06 PROCEDURE — 99497 PR ADVNCD CARE PLAN 30 MIN: ICD-10-PCS | Mod: S$GLB,,, | Performed by: NURSE PRACTITIONER

## 2021-12-06 PROCEDURE — 99350 HOME/RES VST EST HIGH MDM 60: CPT | Mod: S$GLB,,, | Performed by: NURSE PRACTITIONER

## 2021-12-06 PROCEDURE — 99497 ADVNCD CARE PLAN 30 MIN: CPT | Mod: S$GLB,,, | Performed by: NURSE PRACTITIONER

## 2021-12-06 RX ORDER — ERGOCALCIFEROL 1.25 MG/1
50000 CAPSULE ORAL
Qty: 12 CAPSULE | Refills: 12 | Status: CANCELLED | OUTPATIENT
Start: 2021-12-06

## 2021-12-07 ENCOUNTER — DOCUMENT SCAN (OUTPATIENT)
Dept: HOME HEALTH SERVICES | Facility: HOSPITAL | Age: 76
End: 2021-12-07
Payer: MEDICARE

## 2021-12-08 VITALS
DIASTOLIC BLOOD PRESSURE: 65 MMHG | RESPIRATION RATE: 16 BRPM | TEMPERATURE: 99 F | SYSTOLIC BLOOD PRESSURE: 118 MMHG | HEART RATE: 98 BPM | OXYGEN SATURATION: 98 %

## 2021-12-08 RX ORDER — SIMETHICONE 125 MG
125 TABLET,CHEWABLE ORAL EVERY 6 HOURS PRN
Status: ON HOLD | COMMUNITY
End: 2022-06-12

## 2021-12-08 RX ORDER — ERGOCALCIFEROL 1.25 MG/1
50000 CAPSULE ORAL
Qty: 12 CAPSULE | Refills: 1 | Status: SHIPPED | OUTPATIENT
Start: 2021-12-08 | End: 2022-04-19

## 2021-12-08 RX ORDER — ACETAMINOPHEN 500 MG
500 TABLET ORAL 2 TIMES DAILY
COMMUNITY

## 2021-12-10 ENCOUNTER — EXTERNAL HOME HEALTH (OUTPATIENT)
Dept: HOME HEALTH SERVICES | Facility: HOSPITAL | Age: 76
End: 2021-12-10
Payer: MEDICARE

## 2022-01-03 ENCOUNTER — TELEPHONE (OUTPATIENT)
Dept: FAMILY MEDICINE | Facility: CLINIC | Age: 77
End: 2022-01-03
Payer: MEDICARE

## 2022-01-03 DIAGNOSIS — F02.818 ALZHEIMER'S DEMENTIA OF OTHER ONSET WITH BEHAVIORAL DISTURBANCE: Primary | ICD-10-CM

## 2022-01-03 DIAGNOSIS — R53.81 PHYSICAL DEBILITY: ICD-10-CM

## 2022-01-03 DIAGNOSIS — R53.81 PHYSICAL DECONDITIONING: ICD-10-CM

## 2022-01-03 DIAGNOSIS — G30.8 ALZHEIMER'S DEMENTIA OF OTHER ONSET WITH BEHAVIORAL DISTURBANCE: Primary | ICD-10-CM

## 2022-01-03 NOTE — TELEPHONE ENCOUNTER
----- Message from Beata Trejo sent at 1/3/2022  3:22 PM CST -----  Contact: Tereso adair (Other)  Type: Call Back    Who called: Tereso adair (Other)    What is the request in detail: Tereso adair (Other) is requesting a call back. She states that she is calling to check the status on the hospital bed and wheelchair that they requested. She states that she contacted the insurance company and they have not received the order. Please advise.     Can the clinic reply by [facility]? No    Would the patient rather a call back or a response via [facility]? Call back     Best call back number: [phone] (mobile)    Additional Information:

## 2022-01-04 ENCOUNTER — TELEPHONE (OUTPATIENT)
Dept: FAMILY MEDICINE | Facility: CLINIC | Age: 77
End: 2022-01-04
Payer: MEDICARE

## 2022-01-04 NOTE — TELEPHONE ENCOUNTER
Entered as requested. Please check that this was sent previously on 12/8/21.  Thanks.    Physical debility  -     HOSPITAL BED FOR HOME USE  -     WHEELCHAIR FOR HOME USE    Physical deconditioning  -     HOSPITAL BED FOR HOME USE  -     WHEELCHAIR FOR HOME USE

## 2022-01-18 ENCOUNTER — CARE AT HOME (OUTPATIENT)
Dept: HOME HEALTH SERVICES | Facility: CLINIC | Age: 77
End: 2022-01-18
Payer: MEDICARE

## 2022-01-18 DIAGNOSIS — I10 ESSENTIAL (PRIMARY) HYPERTENSION: Primary | ICD-10-CM

## 2022-01-18 DIAGNOSIS — R26.89 DECREASED MOBILITY: ICD-10-CM

## 2022-01-18 PROCEDURE — 99499 UNLISTED E&M SERVICE: CPT | Mod: S$GLB,,, | Performed by: NURSE PRACTITIONER

## 2022-01-18 PROCEDURE — 99350 PR HOME VISIT,ESTAB PATIENT,LEVEL IV: ICD-10-PCS | Mod: S$GLB,,, | Performed by: NURSE PRACTITIONER

## 2022-01-18 PROCEDURE — 99499 RISK ADDL DX/OHS AUDIT: ICD-10-PCS | Mod: S$GLB,,, | Performed by: NURSE PRACTITIONER

## 2022-01-18 PROCEDURE — 99350 HOME/RES VST EST HIGH MDM 60: CPT | Mod: S$GLB,,, | Performed by: NURSE PRACTITIONER

## 2022-01-19 VITALS
TEMPERATURE: 99 F | HEART RATE: 100 BPM | RESPIRATION RATE: 16 BRPM | OXYGEN SATURATION: 100 % | SYSTOLIC BLOOD PRESSURE: 105 MMHG | DIASTOLIC BLOOD PRESSURE: 65 MMHG

## 2022-01-19 NOTE — PATIENT INSTRUCTIONS
Instructions:  - [facility] Nurse Practitioner to schedule home follow-up visit with patient in 8 weeks.  - Continue all medications, treatments and therapies as ordered.   - Follow all instructions, recommendations as discussed.  - Maintain Safety Precautions at all times.  - Attend all medical appointments as scheduled.  - For worsening symptoms: call Primary Care Physician or Nurse Practitioner.  - For emergencies, call 911 or immediately report to the nearest emergency room.  - Limit Risks of environmental exposure to coronavirus/COVID-19 as discussed including: social distancing, hand hygiene, and limiting departures from the home for necessities only.

## 2022-01-19 NOTE — PROGRESS NOTES
"[facility]  Medical Home Visit    Visit Date: 1/18/2021  Encounter Provider: Ciro Saldivar NP  PCP:  Gopal Hobbs MD    Subjective:      Patient ID: Gera Londono is a 76 y.o. male.    Consult Requested By:  No ref. provider found  Reason for Consult:  Medical visit to establish care    Gera is being seen at home due to  physical debility that presents a taxing effort to leave the home, to mitigate high risk of hospital readmission or due to the limited availability of reliable or safe options for transportation to the point of access to the provider. The visit meets the criteria for medical necessity as defined by CMS as "health-care services needed to prevent, diagnose, or treat an illness, injury, condition, disease, or its symptoms and that meet accepted standards of medicine."  Prior to treatment on this visit the chart was reviewed and patient consent was obtained.    Chief Complaint: Establish Care and Follow-up    Gera Londono is a 77 y/o male with a pmhx significant for HTN, HLD, Stg III CKD, decreased mobility, and L hip pain. He is being seen in the home today in conjunction with his PCP Dr. Gopal Hobbs, as he is unable to easily transport out of the home. The patient currently lives in his own house on the [facility] [place], where he has his x 2 nieces and x 1 uncle present 24/7 to care for him in the home. Patient/family indicate that the patient has never been  and has no children. 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 Hobbies are limited and include watching TV. When asked whether assisted living or NH may be an option for the patient, the family vehemently refused stating he is not to leave his home.    With this visit today patient is found lying on his couch with his niece, Kelsey, present for the visit. Most of her other history was received from his niece and his medical record. Patient currently AAOx2. Patient endorses hx of smoking x 20 years ago and " "has since quit. Social drinking history endorsed. Patient currently being seen by Srini MARTINEZ RN and OT; nurse aide not available with current HH. Patient endorses "great" appetite, normals BMs, and partial assistance with ADLs. Currently ambulates with cane or rollaider. No skin BD present at this time.     VSS. Denies fever, chest pain, shortness of breath, nausea, vomiting, diarrhea. Risks of environmental exposure to coronavirus discussed including: social distancing, hand hygiene, and limiting departures from the home for necessities only.  Reports understanding and willingness to comply.  All hospital discharge orders reviewed and being followed, all medications reconciled and reviewed, patient and family verbalized understanding. No other needs identified at this time.      I initiated the process of advance care planning today and explained the importance of this process to the patient and family.  I introduced the concept of advance directives to the patient, as well. The patient has not previously appointed a HCPOA. Then the patient received detailed information about the importance of designating a Health Care Power of  (HCPOA). LAPOST given to family, plan to retrieve on next visit. The patient was also instructed to communicate with this person about their wishes for future healthcare, should patient become sick and lose decision-making capacity. We spoke about ACP for 30 minutes.    Attestation: Screening criteria to assess the level of the patient's risk for infection with COVID-19 as recommended by the CDC at the time of the above documented home visit concluded appropriateness to proceed. Universal precautions were maintained at all times, including provider use of 60% alcohol gel hand  immediately prior to entry and upon departing the patient's home.    Review of Systems   Unable to perform ROS: Dementia       Assessments:  · Environmental: steps upon entrance, cluttered  · Functional " Status: partial assist  · Safety: high FR  · Nutritional: adequate  · Home Health/DME/Supplies: cane, rollaider, walker    Objective:   Physical Exam  Vitals reviewed.   Constitutional:       Appearance: He is well-developed.   HENT:      Head: Normocephalic and atraumatic.   Eyes:      General: Lids are normal.      Pupils: Pupils are equal, round, and reactive to light.   Cardiovascular:      Rate and Rhythm: Normal rate.   Pulmonary:      Effort: Pulmonary effort is normal.      Breath sounds: Normal breath sounds.   Abdominal:      General: Bowel sounds are normal.      Palpations: Abdomen is soft.   Musculoskeletal:         General: Normal range of motion.      Cervical back: Normal range of motion and neck supple.      Right lower leg: No edema.      Left lower leg: No edema.   Skin:     General: Skin is warm and dry.      Comments: No breakdown.   Neurological:      Mental Status: He is alert. Mental status is at baseline.      GCS: GCS eye subscore is 4. GCS verbal subscore is 3. GCS motor subscore is 6.      Comments: Generalized weakness.   Psychiatric:         Mood and Affect: Mood normal.         Behavior: Behavior normal.         Vitals:    01/19/22 1418   BP: 105/65   Pulse: 100   Resp: 16   Temp: 98.7 °F (37.1 °C)   SpO2: 100%   PainSc: 0-No pain     There is no height or weight on file to calculate BMI.    Assessment:     No diagnosis found.    Plan:     Ethical / Legal: Advance Care Planning   · Surrogate decision maker:  Payton Choi, Relationship: niece  · Code Status:  undecided  · LaPOST:  Given to patient and family; discussed, plan to obtain at next visit  · Other advance directive:  None on file, Capacity to make medical decisions:  none, Conflict none       Gera was seen today for \[facility]\"" care.    Diagnoses and all orders for this visit:    Essential (primary) hypertension  --compliant with antihypertensives  --normotensive at visit    Other hyperlipidemia  --compliant with  statin therapy    Stage 3 chronic kidney disease, unspecified whether stage 3a or 3b CKD  --adequate u/o per patient and family  --last (4/2021) Cr/BUN: 1.6/21  --plan to obtain labs at next visit per Dr. Hobbs (PCP)    Vitamin D deficiency  -     ergocalciferol (ERGOCALCIFEROL) 50,000 unit Cap; Take 1 capsule (50,000 Units total) by mouth every 7 days. Follow up Dr. DENITA Hobbs August 2020    Osteoarthritis, unspecified osteoarthritis type, unspecified site  --working with OT  --family refusing PT    Decreased mobility  --see OA      Were controlled substances prescribed?  No    Follow Up Appointments:   No future appointments.    Signature:  Rikki Judice, AG-ACNP [facility]    Total face-to-face time was 60 minutes, >50% of this was spent on counseling and coordination of care. The following issues were discussed: primary and secondary diagnoses, co-morbidities, prescribed medications, treatment modalities, importance of compliance with medical advice, and directives for follow-up care.

## 2022-01-25 ENCOUNTER — DOCUMENT SCAN (OUTPATIENT)
Dept: HOME HEALTH SERVICES | Facility: HOSPITAL | Age: 77
End: 2022-01-25
Payer: MEDICARE

## 2022-01-28 ENCOUNTER — TELEPHONE (OUTPATIENT)
Dept: FAMILY MEDICINE | Facility: CLINIC | Age: 77
End: 2022-01-28
Payer: MEDICARE

## 2022-01-31 ENCOUNTER — TELEPHONE (OUTPATIENT)
Dept: FAMILY MEDICINE | Facility: CLINIC | Age: 77
End: 2022-01-31
Payer: MEDICARE

## 2022-01-31 DIAGNOSIS — N18.30 STAGE 3 CHRONIC KIDNEY DISEASE, UNSPECIFIED WHETHER STAGE 3A OR 3B CKD: Primary | ICD-10-CM

## 2022-01-31 DIAGNOSIS — R54 AGE-RELATED PHYSICAL DEBILITY: ICD-10-CM

## 2022-01-31 DIAGNOSIS — G30.8 ALZHEIMER'S DEMENTIA OF OTHER ONSET WITHOUT BEHAVIORAL DISTURBANCE: ICD-10-CM

## 2022-01-31 DIAGNOSIS — R26.89 DECREASED MOBILITY: ICD-10-CM

## 2022-01-31 DIAGNOSIS — F02.80 ALZHEIMER'S DEMENTIA OF OTHER ONSET WITHOUT BEHAVIORAL DISTURBANCE: ICD-10-CM

## 2022-01-31 NOTE — TELEPHONE ENCOUNTER
Entered as requested. A bedside tablet for his hospital bed was also hand-written on the prescription pad.    Please fax the orders to [facility], and leave it for patient's niece Kelsey pickup hard copy from the office  (she is aware to come  on 2/1/22).      Thanks.        Age-related physical debility  -     Ambulatory referral/consult to Home Health; Future; Expected date: 02/01/2022    Decreased mobility  -     Ambulatory referral/consult to Home Health; Future; Expected date: 02/01/2022

## 2022-02-02 NOTE — TELEPHONE ENCOUNTER
----- Message from Kira Turner sent at 2/1/2022  4:53 PM CST -----  Type: Patient Call Back    Who called:pt's beth adair [phone]    What is the request in detail:requesting to discuss getting home health care. Call pt    Can the clinic reply by [facility]?    Would the patient rather a call back or a response via [facility]? call    Best call back number:[phone] (home)       Additional Information:

## 2022-02-02 NOTE — TELEPHONE ENCOUNTER
Spoke with ham and told her DME orders was sent to [facility] and [facility].

## 2022-02-09 ENCOUNTER — TELEPHONE (OUTPATIENT)
Dept: FAMILY MEDICINE | Facility: CLINIC | Age: 77
End: 2022-02-09
Payer: MEDICARE

## 2022-02-09 NOTE — TELEPHONE ENCOUNTER
----- Message from Nella Lowe sent at 2/9/2022  3:22 PM CST -----  Type: Patient Call Back    Who called: Kelsey wife      What is the request in detail: Needs to speak with nurse about patient's bed order. Order was sent to the wrong place and needs to be sent to [facility].     Can the clinic reply by [facility]? No     Would the patient rather a call back or a response via [facility]? Call back     Best call back number: [phone]

## 2022-02-09 NOTE — TELEPHONE ENCOUNTER
----- Message from Kira Turner sent at 2/9/2022  1:37 PM CST -----  Type: Patient Call Back    Who called:pt's beth cheek giovany [phone]    What is the request in detail:pt requesting home health care. Call     Can the clinic reply by [facility]?    Would the patient rather a call back or a response via [facility]? Ca;;    Best call back number:[phone] (home)       Additional Information:

## 2022-02-09 NOTE — TELEPHONE ENCOUNTER
Spoke with beth about letting home health eval pt to see what his needs are. She agreed to let the pt be seen. Beth only wants nurse aide to bathe and feed pt no other care.

## 2022-02-09 NOTE — TELEPHONE ENCOUNTER
----- Message from Kira Turner sent at 2/9/2022 12:52 PM CST -----  Type: Patient Call Back    Who called:pt's beth ham [phone]    What is the request in detail:following up on bed table that comes with the hospital bed. Needs order. Call ham    Can the clinic reply by [facility]?    Would the patient rather a call back or a response via [facility]? call    Best call back number:[phone] (home)       Additional Information:

## 2022-02-11 ENCOUNTER — TELEPHONE (OUTPATIENT)
Dept: FAMILY MEDICINE | Facility: CLINIC | Age: 77
End: 2022-02-11
Payer: MEDICARE

## 2022-02-11 DIAGNOSIS — R26.89 DECREASED MOBILITY: Primary | ICD-10-CM

## 2022-02-11 DIAGNOSIS — R54 AGE-RELATED PHYSICAL DEBILITY: ICD-10-CM

## 2022-02-11 NOTE — TELEPHONE ENCOUNTER
Pt's niece told that bedside table is not a covered item by insurance and that she will have to pay for it.

## 2022-03-07 ENCOUNTER — CARE AT HOME (OUTPATIENT)
Dept: HOME HEALTH SERVICES | Facility: CLINIC | Age: 77
End: 2022-03-07
Payer: MEDICARE

## 2022-03-07 DIAGNOSIS — R26.89 DECREASED MOBILITY: Primary | ICD-10-CM

## 2022-03-07 PROCEDURE — 99350 HOME/RES VST EST HIGH MDM 60: CPT | Mod: S$GLB,,, | Performed by: NURSE PRACTITIONER

## 2022-03-07 PROCEDURE — 99350 PR HOME VISIT,ESTAB PATIENT,LEVEL IV: ICD-10-PCS | Mod: S$GLB,,, | Performed by: NURSE PRACTITIONER

## 2022-03-10 VITALS
DIASTOLIC BLOOD PRESSURE: 55 MMHG | OXYGEN SATURATION: 97 % | HEART RATE: 102 BPM | SYSTOLIC BLOOD PRESSURE: 105 MMHG | RESPIRATION RATE: 18 BRPM | TEMPERATURE: 100 F

## 2022-03-10 NOTE — PATIENT INSTRUCTIONS
Instructions:  - [facility] Nurse Practitioner to schedule home follow-up visit with patient in 6 weeks.  - Continue all medications, treatments and therapies as ordered.   - Follow all instructions, recommendations as discussed.  - Maintain Safety Precautions at all times.  - Attend all medical appointments as scheduled.  - For worsening symptoms: call Primary Care Physician or Nurse Practitioner.  - For emergencies, call 911 or immediately report to the nearest emergency room.  - Limit Risks of environmental exposure to coronavirus/COVID-19 as discussed including: social distancing, hand hygiene, and limiting departures from the home for necessities only.

## 2022-03-10 NOTE — PROGRESS NOTES
"  [facility]  Medical Home Visit    Visit Date: 1/18/2021  Encounter Provider: Ciro Saldivar NP  PCP:  Gopal Hobbs MD    Subjective:      Patient ID: Gera Londono is a 77 y.o. male.    Consult Requested By:  No ref. provider found  Reason for Consult:  Medical visit to establish care    Gera is being seen at home due to  physical debility that presents a taxing effort to leave the home, to mitigate high risk of hospital readmission or due to the limited availability of reliable or safe options for transportation to the point of access to the provider. The visit meets the criteria for medical necessity as defined by CMS as "health-care services needed to prevent, diagnose, or treat an illness, injury, condition, disease, or its symptoms and that meet accepted standards of medicine."  Prior to treatment on this visit the chart was reviewed and patient consent was obtained.    Chief Complaint: No chief complaint on file.    Gera Londono is a 77 y/o male with a pmhx significant for HTN, HLD, Stg III CKD, decreased mobility, and L hip pain. He is being seen in the home today in conjunction with his PCP Dr. Gopal Hobbs, as he is unable to easily transport out of the home. The patient currently lives in his own house on the [facility], where he has his x 2 nieces and x 1 uncle present 24/7 to care for him in the home. Patient/family indicate that the patient has never been  and has no children. 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 Hobbies are limited and include watching TV. When asked whether assisted living or NH may be an option for the patient, the family vehemently refused stating he is not to leave his home.    With this visit today patient is found lying on his couch with his niece, Kelsey, present for the visit. Most of her other history was received from his niece and his medical record. Patient currently AAOx2. Patient endorses hx of smoking x 20 years ago " "and has since quit. Social drinking history endorsed. Finished visits with HH and family refuses more visits. Patient endorses "great" appetite, normals BMs, and partial assistance with ADLs. Currently ambulates with cane or rollaider. No recent falls; patient mostly bedbound. Neuro referral placed for lower back pain and c/o "left leg jumping". No additional needs at this time. All of my information was given to the patient and family if any questions or concerns arise.     VSS. Denies fever, chest pain, shortness of breath, nausea, vomiting, diarrhea. Risks of environmental exposure to coronavirus discussed including: social distancing, hand hygiene, and limiting departures from the home for necessities only.  Reports understanding and willingness to comply.  All hospital discharge orders reviewed and being followed, all medications reconciled and reviewed, patient and family verbalized understanding. No other needs identified at this time.      I initiated the process of advance care planning today and explained the importance of this process to the patient and family.  I introduced the concept of advance directives to the patient, as well. The patient has not previously appointed a HCPOA. Then the patient received detailed information about the importance of designating a Health Care Power of  (HCPOA). LAPOST given to family, plan to retrieve on next visit. The patient was also instructed to communicate with this person about their wishes for future healthcare, should patient become sick and lose decision-making capacity. We spoke about ACP for 30 minutes.    Attestation: Screening criteria to assess the level of the patient's risk for infection with COVID-19 as recommended by the CDC at the time of the above documented home visit concluded appropriateness to proceed. Universal precautions were maintained at all times, including provider use of 60% alcohol gel hand  immediately prior to entry " and upon departing the patient's home.    Review of Systems   Unable to perform ROS: Dementia       Assessments:  · Environmental: steps upon entrance, cluttered  · Functional Status: partial assist  · Safety: high FR  · Nutritional: adequate  · Home Health/DME/Supplies: cane, rollaider, walker    Objective:   Physical Exam  Vitals reviewed.   Constitutional:       Appearance: He is well-developed.   HENT:      Head: Normocephalic and atraumatic.   Eyes:      General: Lids are normal.      Pupils: Pupils are equal, round, and reactive to light.   Cardiovascular:      Rate and Rhythm: Normal rate.   Pulmonary:      Effort: Pulmonary effort is normal.      Breath sounds: Normal breath sounds.   Abdominal:      General: Bowel sounds are normal.      Palpations: Abdomen is soft.   Musculoskeletal:         General: Normal range of motion.      Cervical back: Normal range of motion and neck supple.      Right lower leg: No edema.      Left lower leg: No edema.   Skin:     General: Skin is warm and dry.      Comments: No breakdown   Neurological:      Mental Status: He is alert. Mental status is at baseline.      GCS: GCS eye subscore is 4. GCS verbal subscore is 3. GCS motor subscore is 6.      Comments: Generalized weakness.   Psychiatric:         Mood and Affect: Mood normal.         Behavior: Behavior normal.         Vitals:    03/10/22 1345   BP: (!) 105/55   Pulse: 102   Resp: 18   Temp: 99.7 °F (37.6 °C)   SpO2: 97%   PainSc: 0-No pain     There is no height or weight on file to calculate BMI.    Assessment:     1. Decreased mobility        Plan:     Ethical / Legal: Advance Care Planning   · Surrogate decision maker:  Name Kelsey Jimbo, Relationship: niece  · Code Status:  undecided  · La[place]:  Given to patient and family; discussed, plan to obtain at next visit  · Other advance directive:  None on file, Capacity to make medical decisions:  none, Conflict none       Gera was seen today for establish  care.    Diagnoses and all orders for this visit:    Essential (primary) hypertension  --compliant with antihypertensives  --normotensive at visit    Other hyperlipidemia  --compliant with statin therapy    Stage 3 chronic kidney disease, unspecified whether stage 3a or 3b CKD  --adequate u/o per patient and family  --last (4/2021) Cr/BUN: 1.6/21  --plan to obtain labs at next visit per Dr. Hobbs (PCP)    Vitamin D deficiency  -     ergocalciferol (ERGOCALCIFEROL) 50,000 unit Cap; Take 1 capsule (50,000 Units total) by mouth every 7 days. Follow up Dr. DENITA Hobbs August 2020    Osteoarthritis, unspecified osteoarthritis type, unspecified site  --working with OT  --family refusing PT    Decreased mobility  --see OA  --neuro referral placed      Were controlled substances prescribed?  No    Follow Up Appointments:   No future appointments.    Signature:  Rikki Judice, AG-ACNP [facility]    Total face-to-face time was 60 minutes, >50% of this was spent on counseling and coordination of care. The following issues were discussed: primary and secondary diagnoses, co-morbidities, prescribed medications, treatment modalities, importance of compliance with medical advice, and directives for follow-up care.

## 2022-03-11 ENCOUNTER — EXTERNAL HOME HEALTH (OUTPATIENT)
Dept: HOME HEALTH SERVICES | Facility: HOSPITAL | Age: 77
End: 2022-03-11
Payer: MEDICARE

## 2022-03-17 ENCOUNTER — DOCUMENT SCAN (OUTPATIENT)
Dept: HOME HEALTH SERVICES | Facility: HOSPITAL | Age: 77
End: 2022-03-17
Payer: MEDICARE

## 2022-05-09 ENCOUNTER — TELEPHONE (OUTPATIENT)
Dept: FAMILY MEDICINE | Facility: CLINIC | Age: 77
End: 2022-05-09
Payer: MEDICARE

## 2022-05-09 NOTE — TELEPHONE ENCOUNTER
Spoke with [employer] cpt code given to [employer] rep. No prior authorizations needed, reference :[id number].

## 2022-05-09 NOTE — TELEPHONE ENCOUNTER
Patients niece informed f cpt code says it needs to be sent to [facility] . Will contact [facility]

## 2022-05-09 NOTE — TELEPHONE ENCOUNTER
----- Message from Missy Jimenez sent at 5/9/2022  9:41 AM CDT -----  Type: Patient Call Back    Who called: Kelsey/beth    What is the request in detail: patient beth stated that [employer] is requesting a CPT code for patient's nurse visits and equipment. Please call    Can the clinic reply by [facility]? No    Would the patient rather a call back or a response via [facility]? call    Best call back number: [phone]

## 2022-05-09 NOTE — TELEPHONE ENCOUNTER
Spoke with patient's niece, informed that patient's insurance ([employer]) is charging patient to rent equipment every month, and requires a CPT code to be sent to insurance company. Patent has a bed wheel chair and a sliding board. Patient's neice informed that her request will be sent to his provider and once a response is received someone will reach back ermelinda rinaldi , encouraged to call clinic back for any further concerns or questions,

## 2022-05-12 ENCOUNTER — CARE AT HOME (OUTPATIENT)
Dept: HOME HEALTH SERVICES | Facility: CLINIC | Age: 77
End: 2022-05-12
Payer: MEDICARE

## 2022-05-12 DIAGNOSIS — I10 ESSENTIAL (PRIMARY) HYPERTENSION: ICD-10-CM

## 2022-05-12 DIAGNOSIS — R26.89 DECREASED MOBILITY: Primary | ICD-10-CM

## 2022-05-12 PROCEDURE — 99442 PR PHYSICIAN TELEPHONE EVALUATION 11-20 MIN: CPT | Mod: 95,,, | Performed by: NURSE PRACTITIONER

## 2022-05-12 PROCEDURE — 99442 PR PHYSICIAN TELEPHONE EVALUATION 11-20 MIN: ICD-10-PCS | Mod: 95,,, | Performed by: NURSE PRACTITIONER

## 2022-05-16 DIAGNOSIS — E78.49 OTHER HYPERLIPIDEMIA: ICD-10-CM

## 2022-05-17 RX ORDER — ATORVASTATIN CALCIUM 20 MG/1
TABLET, FILM COATED ORAL
Qty: 90 TABLET | Refills: 3 | Status: SHIPPED | OUTPATIENT
Start: 2022-05-17

## 2022-05-17 NOTE — TELEPHONE ENCOUNTER
Care Due:                  Date            Visit Type   Department     Provider  --------------------------------------------------------------------------------                                             [facility]                              VIRTUAL      MED/ INTERNAL  Last Visit: 08-      AUDIO ONLY   MED/ PEDS      Gopal Hobbs  Next Visit: None Scheduled  None         None Found                                                            Last  Test          Frequency    Reason                     Performed    Due Date  --------------------------------------------------------------------------------    Office Visit  12 months..  atorvastatin, losartan...  08- 08-    CMP.........  12 months..  atorvastatin, losartan...  04- 04-    Lipid Panel.  12 months..  atorvastatin.............  04- 04-    [employer] Embedded Care Gaps. Reference number: [id number]. 5/17/2022   1:26:11 PM CDT

## 2022-05-17 NOTE — TELEPHONE ENCOUNTER
Refill Routing Note   Medication(s) are not appropriate for processing by [facility] for the following reason(s):      - Required laboratory values are outdated    [facility] action(s):  Defer Medication-related problems identified:   Requires labs  Requires appointment     Medication Therapy Plan: Needs an OV with PCP, lov 8/16/2021, Labs (CMP, Lipid panel) overdue, last drawn 4/26/2021  Medication reconciliation completed: No     Appointments  past 12m or future 3m with PCP    Date Provider   Last Visit   8/16/2021 Gopal Hobbs MD   Next Visit   Visit date not found Gopal Hobbs MD   ED visits in past 90 days: 0        Note composed:2:01 PM 05/17/2022

## 2022-05-17 NOTE — PROGRESS NOTES
Established Patient - Audio Only Telehealth Visit     The patient location is: home  The chief complaint leading to consultation is: Medical visit  Visit type: Virtual visit with audio only (telephone)  Total time spent with patient: 15 minutes       The reason for the audio only service rather than synchronous audio and video virtual visit was related to technical difficulties or patient preference/necessity.     Each patient to whom I provide medical services by telemedicine is:  (1) informed of the relationship between the physician and patient and the respective role of any other health care provider with respect to management of the patient; and (2) notified that they may decline to receive medical services by telemedicine and may withdraw from such care at any time. Patient verbally consented to receive this service via voice-only telephone call.       HPI:   Gera Londono is a 75 y/o male with a pmhx significant for HTN, HLD, Stg III CKD, decreased mobility, and L hip pain. He is being seen in the home today in conjunction with his PCP Dr. Gopal Hobbs, as he is unable to easily transport out of the home. The patient currently lives in his own house on the [facility], where he has his x 2 nieces and x 1 uncle present 24/7 to care for him in the home. Patient/family indicate that the patient has never been  and has no children. 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 Hobbies are limited and include watching TV. When asked whether assisted living or NH may be an option for the patient, the family vehemently refused stating he is not to leave his home.      Assessment and plan: With this visit, the patient's niece is communicated with via telephone for visit. I suggested virtual visit with PCP Dr. Hobbs within the next month. [facility] orders placed for RN and aide. [facility] referral placed to assist family with finding sitters. Labs at next visit. No additional needs at this  this time. All of my information was given to the patient and family if any questions or concerns arise.       This service was not originating from a related E/M service provided within the previous 7 days nor will  to an E/M service or procedure within the next 24 hours or my soonest available appointment.  Prevailing standard of care was able to be met in this audio-only visit.      LEYLA Gracia-ACNP [facility]

## 2022-05-23 ENCOUNTER — OUTPATIENT CASE MANAGEMENT (OUTPATIENT)
Dept: ADMINISTRATIVE | Facility: OTHER | Age: 77
End: 2022-05-23
Payer: MEDICARE

## 2022-05-24 ENCOUNTER — CARE AT HOME (OUTPATIENT)
Dept: HOME HEALTH SERVICES | Facility: CLINIC | Age: 77
End: 2022-05-24
Payer: MEDICARE

## 2022-05-24 ENCOUNTER — TELEPHONE (OUTPATIENT)
Dept: FAMILY MEDICINE | Facility: CLINIC | Age: 77
End: 2022-05-24
Payer: MEDICARE

## 2022-05-24 DIAGNOSIS — R26.89 DECREASED MOBILITY: Primary | ICD-10-CM

## 2022-05-24 PROCEDURE — 99442 PR PHYSICIAN TELEPHONE EVALUATION 11-20 MIN: CPT | Mod: 95,,, | Performed by: NURSE PRACTITIONER

## 2022-05-24 PROCEDURE — 99442 PR PHYSICIAN TELEPHONE EVALUATION 11-20 MIN: ICD-10-PCS | Mod: 95,,, | Performed by: NURSE PRACTITIONER

## 2022-05-25 NOTE — PROGRESS NOTES
Attempt #:  1  This LCSW attempted to reach patient/caregiver to provide resource and left a message requesting a return call.

## 2022-05-27 NOTE — PROGRESS NOTES
"Outpatient Care Management   - Low Risk Patient Assessment    Patient: Gera Londono  MRN:  [id number]  Date of Service:  5/27/2022  Completed by:  Gianna Gonzalez LCSW  Referral Date: 05/27/2022  Program: OPCM Low Risk    Reason for Visit   Patient presents with    OPCM Chart Review    Social Work Assessment - Low/Mod Risk    Case Closure    Plan Of Care     Brief Summary: Sw received referral for patient from Ciro Saldivar, NP indicating patient family requesting assistance finding sitters. Sw completed low risk assessment with Pt's niece/caregiver via telephone. Pt owns his home and having family members staying with him. Pt requires assistance with ADLs and utilizes assistive devices to ambulate (cane or walker). Pt's niece provides support to patient with his daily care and manages his medical needs. Pt's caregiver reports family is interested in obtaining additional in-home support for patient and want to know whether is medical insurance will cover the cost. Pt's niece advised that sitter/PCA services are defined as non-medical services and are not typically covered by private insurance or Medicare. Caregiver was advised to apply for assistance through [place] [employer] in LTC. Pt's niece reported that family recently completed application for CCW Program. Sw advised niece to follow up with the state regarding status of application. Pt's niece informed that Sw shared all available "free" services with her. Sw offered to mail fact sheet for LTC/CCW to Pt's address on file and niece voiced understanding. Case closed.     Patient Summary     OPCM Social Work Assessment (Low/Moderate Risk)    General  Level of Caregiver support: Caregiver currently provides assistance, Caregiver needs training or supportive services  Have you had to make a decision between paying for any of the following in the last 2 months?: None  Transportation means: Family  Employment status: Retired and not working, " Disabled  Current symptoms: Memory problems, Confusion  Assessments  Was the PHQ Depression Screening completed this visit?: No  Was the COURTNEY-7 Screening completed this visit?: No         Complex Care Plan     Care plan was discussed and completed today with input from patient and/or caregiver.    Patient Instructions     No follow-ups on file.    Todays OPCM Self-Management Care Plan was developed with the patients/caregivers input and was based on identified barriers from todays assessment.  Goals were written today with the patient/caregiver and the patient has agreed to work towards these goals to improve his/her overall well-being. Patient verbalized understanding of the care plan, goals, and all of today's instructions. Encouraged patient/caregiver to communicate with his/her physician and health care team about health conditions and the treatment plan.  Provided my contact information today and encouraged patient/caregiver to call me with any questions as needed.
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
## 2022-06-02 ENCOUNTER — OUTPATIENT CASE MANAGEMENT (OUTPATIENT)
Dept: ADMINISTRATIVE | Facility: OTHER | Age: 77
End: 2022-06-02
Payer: MEDICARE
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
## 2022-06-06 ENCOUNTER — EXTERNAL HOME HEALTH (OUTPATIENT)
Dept: HOME HEALTH SERVICES | Facility: HOSPITAL | Age: 77
End: 2022-06-06
Payer: MEDICARE

## 2022-06-08 DIAGNOSIS — D50.8 IRON DEFICIENCY ANEMIA SECONDARY TO INADEQUATE DIETARY IRON INTAKE: ICD-10-CM

## 2022-06-09 RX ORDER — FERROUS SULFATE TAB 325 MG (65 MG ELEMENTAL FE) 325 (65 FE) MG
TAB ORAL
Qty: 180 TABLET | Refills: 3 | Status: ON HOLD | OUTPATIENT
Start: 2022-06-09 | End: 2022-06-12

## 2022-06-09 RX ORDER — DOCUSATE SODIUM 100 MG/1
CAPSULE, LIQUID FILLED ORAL
Qty: 180 CAPSULE | Refills: 3 | Status: ON HOLD | OUTPATIENT
Start: 2022-06-09 | End: 2022-06-12

## 2022-06-11 ENCOUNTER — HOSPITAL ENCOUNTER (INPATIENT)
Facility: HOSPITAL | Age: 77
LOS: 6 days | Discharge: HOME HEALTH | DRG: 375 | End: 2022-06-17
Attending: EMERGENCY MEDICINE | Admitting: HOSPITALIST
Payer: MEDICARE

## 2022-06-11 DIAGNOSIS — K92.2 GI BLEED: Primary | ICD-10-CM

## 2022-06-11 DIAGNOSIS — K62.89 RECTAL MASS: ICD-10-CM

## 2022-06-11 DIAGNOSIS — D64.9 ANEMIA REQUIRING TRANSFUSIONS: ICD-10-CM

## 2022-06-11 DIAGNOSIS — I10 ESSENTIAL (PRIMARY) HYPERTENSION: ICD-10-CM

## 2022-06-11 DIAGNOSIS — D50.8 IRON DEFICIENCY ANEMIA SECONDARY TO INADEQUATE DIETARY IRON INTAKE: ICD-10-CM

## 2022-06-11 DIAGNOSIS — K63.89 COLONIC MASS: ICD-10-CM

## 2022-06-11 DIAGNOSIS — D50.9 MICROCYTIC ANEMIA: ICD-10-CM

## 2022-06-11 PROBLEM — E55.9 VITAMIN D DEFICIENCY: Chronic | Status: ACTIVE | Noted: 2019-03-21

## 2022-06-11 PROBLEM — E78.5 HYPERLIPIDEMIA: Chronic | Status: ACTIVE | Noted: 2019-03-21

## 2022-06-11 PROBLEM — R54 AGE-RELATED PHYSICAL DEBILITY: Chronic | Status: ACTIVE | Noted: 2020-02-18

## 2022-06-11 PROBLEM — N18.30 CKD (CHRONIC KIDNEY DISEASE) STAGE 3, GFR 30-59 ML/MIN: Chronic | Status: ACTIVE | Noted: 2020-02-18

## 2022-06-11 LAB
ABO + RH BLD: NORMAL
ALBUMIN SERPL BCP-MCNC: 2.9 G/DL (ref 3.5–5.2)
ALP SERPL-CCNC: 71 U/L (ref 55–135)
ALT SERPL W/O P-5'-P-CCNC: <5 U/L (ref 10–44)
ANION GAP SERPL CALC-SCNC: 12 MMOL/L (ref 8–16)
ANISOCYTOSIS BLD QL SMEAR: SLIGHT
AST SERPL-CCNC: 9 U/L (ref 10–40)
BASO STIPL BLD QL SMEAR: ABNORMAL
BASOPHILS # BLD AUTO: 0.03 K/UL (ref 0–0.2)
BASOPHILS NFR BLD: 0.5 % (ref 0–1.9)
BILIRUB SERPL-MCNC: 0.4 MG/DL (ref 0.1–1)
BLD GP AB SCN CELLS X3 SERPL QL: NORMAL
BUN SERPL-MCNC: 16 MG/DL (ref 8–23)
CALCIUM SERPL-MCNC: 9.1 MG/DL (ref 8.7–10.5)
CHLORIDE SERPL-SCNC: 107 MMOL/L (ref 95–110)
CO2 SERPL-SCNC: 22 MMOL/L (ref 23–29)
CREAT SERPL-MCNC: 1.1 MG/DL (ref 0.5–1.4)
DACRYOCYTES BLD QL SMEAR: ABNORMAL
DIFFERENTIAL METHOD: ABNORMAL
EOSINOPHIL # BLD AUTO: 0.1 K/UL (ref 0–0.5)
EOSINOPHIL NFR BLD: 2.2 % (ref 0–8)
ERYTHROCYTE [DISTWIDTH] IN BLOOD BY AUTOMATED COUNT: 18.4 % (ref 11.5–14.5)
EST. GFR  (AFRICAN AMERICAN): >60 ML/MIN/1.73 M^2
EST. GFR  (NON AFRICAN AMERICAN): >60 ML/MIN/1.73 M^2
FERRITIN SERPL-MCNC: 33 NG/ML (ref 20–300)
GLUCOSE SERPL-MCNC: 119 MG/DL (ref 70–110)
HCT VFR BLD AUTO: 21.9 % (ref 40–54)
HGB BLD-MCNC: 5.7 G/DL (ref 14–18)
HYPOCHROMIA BLD QL SMEAR: ABNORMAL
IMM GRANULOCYTES # BLD AUTO: 0.02 K/UL (ref 0–0.04)
IMM GRANULOCYTES NFR BLD AUTO: 0.3 % (ref 0–0.5)
IRON SERPL-MCNC: 25 UG/DL (ref 45–160)
LYMPHOCYTES # BLD AUTO: 1.6 K/UL (ref 1–4.8)
LYMPHOCYTES NFR BLD: 27.3 % (ref 18–48)
MCH RBC QN AUTO: 18.6 PG (ref 27–31)
MCHC RBC AUTO-ENTMCNC: 26 G/DL (ref 32–36)
MCV RBC AUTO: 71 FL (ref 82–98)
MONOCYTES # BLD AUTO: 0.4 K/UL (ref 0.3–1)
MONOCYTES NFR BLD: 7.1 % (ref 4–15)
NEUTROPHILS # BLD AUTO: 3.7 K/UL (ref 1.8–7.7)
NEUTROPHILS NFR BLD: 62.6 % (ref 38–73)
NRBC BLD-RTO: 0 /100 WBC
OVALOCYTES BLD QL SMEAR: ABNORMAL
PLATELET # BLD AUTO: 303 K/UL (ref 150–450)
PLATELET BLD QL SMEAR: ABNORMAL
PMV BLD AUTO: 9.1 FL (ref 9.2–12.9)
POIKILOCYTOSIS BLD QL SMEAR: SLIGHT
POLYCHROMASIA BLD QL SMEAR: ABNORMAL
POTASSIUM SERPL-SCNC: 3.7 MMOL/L (ref 3.5–5.1)
PROT SERPL-MCNC: 8.1 G/DL (ref 6–8.4)
RBC # BLD AUTO: 3.07 M/UL (ref 4.6–6.2)
SATURATED IRON: 8 % (ref 20–50)
SCHISTOCYTES BLD QL SMEAR: ABNORMAL
SODIUM SERPL-SCNC: 141 MMOL/L (ref 136–145)
TARGETS BLD QL SMEAR: ABNORMAL
TOTAL IRON BINDING CAPACITY: 309 UG/DL (ref 250–450)
TRANSFERRIN SERPL-MCNC: 209 MG/DL (ref 200–375)
TRANSFERRIN SERPL-MCNC: 209 MG/DL (ref 200–375)
WBC # BLD AUTO: 5.9 K/UL (ref 3.9–12.7)

## 2022-06-11 PROCEDURE — A4216 STERILE WATER/SALINE, 10 ML: HCPCS | Performed by: PHYSICIAN ASSISTANT

## 2022-06-11 PROCEDURE — 99285 EMERGENCY DEPT VISIT HI MDM: CPT | Mod: ,,, | Performed by: PHYSICIAN ASSISTANT

## 2022-06-11 PROCEDURE — C9113 INJ PANTOPRAZOLE SODIUM, VIA: HCPCS | Performed by: STUDENT IN AN ORGANIZED HEALTH CARE EDUCATION/TRAINING PROGRAM

## 2022-06-11 PROCEDURE — 93010 EKG 12-LEAD: ICD-10-PCS | Mod: ,,, | Performed by: INTERNAL MEDICINE

## 2022-06-11 PROCEDURE — 63600175 PHARM REV CODE 636 W HCPCS: Performed by: STUDENT IN AN ORGANIZED HEALTH CARE EDUCATION/TRAINING PROGRAM

## 2022-06-11 PROCEDURE — 86803 HEPATITIS C AB TEST: CPT | Performed by: EMERGENCY MEDICINE

## 2022-06-11 PROCEDURE — 86900 BLOOD TYPING SEROLOGIC ABO: CPT | Performed by: PHYSICIAN ASSISTANT

## 2022-06-11 PROCEDURE — 86920 COMPATIBILITY TEST SPIN: CPT

## 2022-06-11 PROCEDURE — 36415 COLL VENOUS BLD VENIPUNCTURE: CPT | Performed by: STUDENT IN AN ORGANIZED HEALTH CARE EDUCATION/TRAINING PROGRAM

## 2022-06-11 PROCEDURE — 93010 ELECTROCARDIOGRAM REPORT: CPT | Mod: ,,, | Performed by: INTERNAL MEDICINE

## 2022-06-11 PROCEDURE — 36430 TRANSFUSION BLD/BLD COMPNT: CPT

## 2022-06-11 PROCEDURE — P9021 RED BLOOD CELLS UNIT: HCPCS | Performed by: PHYSICIAN ASSISTANT

## 2022-06-11 PROCEDURE — 82728 ASSAY OF FERRITIN: CPT | Performed by: STUDENT IN AN ORGANIZED HEALTH CARE EDUCATION/TRAINING PROGRAM

## 2022-06-11 PROCEDURE — 80053 COMPREHEN METABOLIC PANEL: CPT | Performed by: PHYSICIAN ASSISTANT

## 2022-06-11 PROCEDURE — 85025 COMPLETE CBC W/AUTO DIFF WBC: CPT | Performed by: PHYSICIAN ASSISTANT

## 2022-06-11 PROCEDURE — 99223 PR INITIAL HOSPITAL CARE,LEVL III: ICD-10-PCS | Mod: AI,GC,, | Performed by: HOSPITALIST

## 2022-06-11 PROCEDURE — 99285 EMERGENCY DEPT VISIT HI MDM: CPT | Mod: 25

## 2022-06-11 PROCEDURE — 99223 1ST HOSP IP/OBS HIGH 75: CPT | Mod: AI,GC,, | Performed by: HOSPITALIST

## 2022-06-11 PROCEDURE — 86901 BLOOD TYPING SEROLOGIC RH(D): CPT | Performed by: PHYSICIAN ASSISTANT

## 2022-06-11 PROCEDURE — 84466 ASSAY OF TRANSFERRIN: CPT | Performed by: STUDENT IN AN ORGANIZED HEALTH CARE EDUCATION/TRAINING PROGRAM

## 2022-06-11 PROCEDURE — 25000003 PHARM REV CODE 250: Performed by: PHYSICIAN ASSISTANT

## 2022-06-11 PROCEDURE — 0052A HC IMMUNIZ ADMIN, SARS-COV-2 COVID-19 VACC, TRI SUCROSE, 30MCG/0.3ML, 2ND DOSE: CPT | Performed by: PHYSICIAN ASSISTANT

## 2022-06-11 PROCEDURE — 91305 PHARM REV CODE 636 W HCPCS: CPT | Performed by: PHYSICIAN ASSISTANT

## 2022-06-11 PROCEDURE — 93005 ELECTROCARDIOGRAM TRACING: CPT

## 2022-06-11 PROCEDURE — 86920 COMPATIBILITY TEST SPIN: CPT | Performed by: PHYSICIAN ASSISTANT

## 2022-06-11 PROCEDURE — 11000001 HC ACUTE MED/SURG PRIVATE ROOM

## 2022-06-11 PROCEDURE — 63600175 PHARM REV CODE 636 W HCPCS: Performed by: PHYSICIAN ASSISTANT

## 2022-06-11 PROCEDURE — 99285 PR EMERGENCY DEPT VISIT,LEVEL V: ICD-10-PCS | Mod: ,,, | Performed by: PHYSICIAN ASSISTANT

## 2022-06-11 RX ORDER — PANTOPRAZOLE SODIUM 40 MG/10ML
80 INJECTION, POWDER, LYOPHILIZED, FOR SOLUTION INTRAVENOUS ONCE
Status: COMPLETED | OUTPATIENT
Start: 2022-06-11 | End: 2022-06-11

## 2022-06-11 RX ORDER — HYDROCODONE BITARTRATE AND ACETAMINOPHEN 500; 5 MG/1; MG/1
TABLET ORAL
Status: DISCONTINUED | OUTPATIENT
Start: 2022-06-11 | End: 2022-06-17 | Stop reason: HOSPADM

## 2022-06-11 RX ORDER — ATORVASTATIN CALCIUM 20 MG/1
20 TABLET, FILM COATED ORAL DAILY
Status: DISCONTINUED | OUTPATIENT
Start: 2022-06-12 | End: 2022-06-17 | Stop reason: HOSPADM

## 2022-06-11 RX ORDER — MICONAZOLE NITRATE 2 %
POWDER (GRAM) TOPICAL 2 TIMES DAILY
Status: DISCONTINUED | OUTPATIENT
Start: 2022-06-11 | End: 2022-06-17 | Stop reason: HOSPADM

## 2022-06-11 RX ORDER — SODIUM CHLORIDE 0.9 % (FLUSH) 0.9 %
10 SYRINGE (ML) INJECTION
Status: DISCONTINUED | OUTPATIENT
Start: 2022-06-11 | End: 2022-06-11

## 2022-06-11 RX ORDER — LOSARTAN POTASSIUM 25 MG/1
25 TABLET ORAL DAILY
Status: DISCONTINUED | OUTPATIENT
Start: 2022-06-12 | End: 2022-06-11

## 2022-06-11 RX ORDER — TALC
6 POWDER (GRAM) TOPICAL NIGHTLY PRN
Status: DISCONTINUED | OUTPATIENT
Start: 2022-06-11 | End: 2022-06-17 | Stop reason: HOSPADM

## 2022-06-11 RX ORDER — SODIUM CHLORIDE 0.9 % (FLUSH) 0.9 %
10 SYRINGE (ML) INJECTION
Status: DISCONTINUED | OUTPATIENT
Start: 2022-06-11 | End: 2022-06-17 | Stop reason: HOSPADM

## 2022-06-11 RX ORDER — LANOLIN ALCOHOL/MO/W.PET/CERES
1 CREAM (GRAM) TOPICAL DAILY
Refills: 3 | Status: DISCONTINUED | OUTPATIENT
Start: 2022-06-12 | End: 2022-06-17 | Stop reason: HOSPADM

## 2022-06-11 RX ORDER — ONDANSETRON 2 MG/ML
4 INJECTION INTRAMUSCULAR; INTRAVENOUS ONCE AS NEEDED
Status: DISCONTINUED | OUTPATIENT
Start: 2022-06-11 | End: 2022-06-17 | Stop reason: HOSPADM

## 2022-06-11 RX ADMIN — PANTOPRAZOLE SODIUM 80 MG: 40 INJECTION, POWDER, FOR SOLUTION INTRAVENOUS at 05:06

## 2022-06-11 RX ADMIN — MICONAZOLE NITRATE 2 % TOPICAL POWDER: at 11:06

## 2022-06-11 RX ADMIN — BNT162B2 0.3 ML: 0.23 INJECTION, SUSPENSION INTRAMUSCULAR at 02:06

## 2022-06-11 RX ADMIN — Medication 10 ML: at 05:06
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
## 2022-06-11 NOTE — ASSESSMENT & PLAN NOTE
--PT/OT consulted, suspect patient might require placement following hospitalization for conditioning purposes

## 2022-06-11 NOTE — PLAN OF CARE
SW consulted d/t concerns about family's ability to care for him at home. Will complete initial assessment in ED.     Laverne Stringer LMSW  ED   Care Management  [facility]  Ext. [phone]

## 2022-06-11 NOTE — ED NOTES
Patient identifiers for Gera Londono 77 y.o. male checked and correct.  Chief Complaint   Patient presents with    Rectal Bleeding     X 1 day, niece reports this AM large amount of dark red stool, denies n/v     Past Medical History:   Diagnosis Date    Essential (primary) hypertension     Stage 3 chronic kidney disease      Allergies reported: Review of patient's allergies indicates:  No Known Allergies      LOC: Patient is awake, alert, and aware of environment with an appropriate affect. Patient is oriented x 4 and speaking appropriately.  APPEARANCE: Patient resting comfortably and in no acute distress. Patient is clean and well groomed, patient's clothing is properly fastened.  HEENT: Pt presents with surgical mask on.   SKIN: The skin is warm and dry. Patient has normal skin turgor and moist mucus membranes.   MUSKULOSKELETAL: Patient is moving all extremities well, no obvious deformities noted. Pulses intact.   RESPIRATORY: Airway is open and patent. Respirations are spontaneous and non-labored with normal effort and rate.  CARDIAC: Patient has a normal rate and rhythm. ** on cardiac monitor. No peripheral edema noted.   ABDOMEN: No distention noted. Soft and non-tender upon palpation.  NEUROLOGICAL: pupils **mm, PERRL. Facial expression is symmetrical. Hand grasps are equal bilaterally. Normal sensation in all extremities when touched with finger.      Patient identifiers for Gera Londono 77 y.o. male checked and correct.  Chief Complaint   Patient presents with    Rectal Bleeding     X 1 day, niece reports this AM large amount of dark red stool, denies n/v     Past Medical History:   Diagnosis Date    Essential (primary) hypertension     Stage 3 chronic kidney disease      Allergies reported: Review of patient's allergies indicates:  No Known Allergies      LOC: Patient is awake, alert,and oriented x 4 and/wife states  Having lots of blood coming from rectum this am   APPEARANCE: Patient resting  comfortably and in no acute distress.   SKIN: The skin is warm and dry   MUSKULOSKELETAL: Moves all extremities, radial pulses normal .   RESPIRATORY: BBS-clear respirations are spontaneous and non-labored with normal effort and rate.  CARDIAC: cardiac monitor(ST-100) no  Edema, cap refill >3 secs    ABDOMEN: Soft and non-distended with bs present in all 4 quads   NEUROLOGICALPERRL

## 2022-06-11 NOTE — ED PROVIDER NOTES
Encounter Date: 6/11/2022       History     Chief Complaint   Patient presents with    Rectal Bleeding     X 1 day, niece reports this AM large amount of dark red stool, denies n/v     77-year-old male with hypertension, CKD, anemia, debility presents for 1 episode of bloody stool this morning.  His family member notes dark red bloody appearing stool which covered the sheets. He denies any complaints, no abdominal pain, rectal pain, lightheadedness, weakness, chest pain, shortness of breath or fever/chills.  Denies any prior similar episodes.  Not on blood thinners.  He has never had a colonoscopy.          Review of patient's allergies indicates:  No Known Allergies  Past Medical History:   Diagnosis Date    Essential (primary) hypertension     Stage 3 chronic kidney disease      No past surgical history on file.  Family History   Problem Relation Age of Onset    Diabetes Mother     Hypertension Mother     Arthritis Mother     Arthritis Father     Diabetes Father     Hypertension Father     Heart disease Father     Diabetes Sister     Hypertension Sister     Arthritis Brother      Social History     Tobacco Use    Smoking status: Former Smoker     Start date: 12/8/2000   Substance Use Topics    Drug use: No     Review of Systems   Constitutional: Negative for fever.   HENT: Negative for sore throat.    Respiratory: Negative for shortness of breath.    Cardiovascular: Negative for chest pain.   Gastrointestinal: Positive for blood in stool. Negative for abdominal distention, abdominal pain, anal bleeding, constipation, diarrhea, nausea, rectal pain and vomiting.   Genitourinary: Negative for dysuria.   Musculoskeletal: Negative for back pain.   Skin: Positive for rash.   Neurological: Negative for weakness and light-headedness.   Hematological: Does not bruise/bleed easily.       Physical Exam     Initial Vitals [06/11/22 1325]   BP Pulse Resp Temp SpO2   101/66 101 20 98.6 °F (37 °C) 100 %      MAP        --         Physical Exam    Nursing note and vitals reviewed.  Constitutional: He appears well-developed and well-nourished. He is not diaphoretic. No distress.   HENT:   Head: Normocephalic and atraumatic.   Eyes: EOM are normal. Pupils are equal, round, and reactive to light.   Pale conjunctivae   Neck: Neck supple.   Normal range of motion.  Cardiovascular: Normal rate, regular rhythm, normal heart sounds and intact distal pulses. Exam reveals no gallop and no friction rub.    No murmur heard.  Pulmonary/Chest: Breath sounds normal. No respiratory distress. He has no wheezes. He has no rhonchi. He has no rales. He exhibits no tenderness.   Abdominal: Abdomen is soft. Bowel sounds are normal. He exhibits no distension and no mass. There is no abdominal tenderness. There is no rebound and no guarding.   Genitourinary: Rectum:      Guaiac result positive.   Guaiac positive stool. : Acceptable.   Genitourinary Comments: RN present as chaperone for genital exam.  There is hyperpigmented, macerated skin at bilateral inguinal folds.  No discharge or bleeding.    Grossly bloody stool in the rectal vault.  No visible melena.  Guaiac positive.     Musculoskeletal:         General: Normal range of motion.      Cervical back: Normal range of motion and neck supple.     Neurological: He is alert and oriented to person, place, and time.   Skin: Skin is warm and dry. Rash noted. There is pallor.   Psychiatric: He has a normal mood and affect.         ED Course   Procedures  Labs Reviewed   CBC W/ AUTO DIFFERENTIAL - Abnormal; Notable for the following components:       Result Value    RBC 3.07 (*)     Hemoglobin 5.7 (*)     Hematocrit 21.9 (*)     MCV 71 (*)     MCH 18.6 (*)     MCHC 26.0 (*)     RDW 18.4 (*)     MPV 9.1 (*)     All other components within normal limits    Narrative:     _Jessi Walter RN acknowledged and accepted results on test(s) HGB via   secure chat.  by [email or web address] 06/11/2022 14:51    COMPREHENSIVE METABOLIC PANEL - Abnormal; Notable for the following components:    CO2 22 (*)     Glucose 119 (*)     Albumin 2.9 (*)     AST 9 (*)     ALT <5 (*)     All other components within normal limits   HEPATITIS C ANTIBODY   SARS-COV-2 RDRP GENE   TYPE & SCREEN   PREPARE RBC SOFT     EKG Readings: (Independently Interpreted)   Initial Reading: No STEMI. Rhythm: Normal Sinus Rhythm. Heart Rate: 88. Ectopy: No Ectopy. Axis: Normal.   Nonspecific ST segment flattening noted to anterior leads       Imaging Results    None          Medications   0.9%  NaCl infusion (for blood administration) (has no administration in time range)   miconazole NITRATE 2 % top powder (has no administration in time range)   sodium chloride 0.9% flush 10 mL (has no administration in time range)   melatonin tablet 6 mg (has no administration in time range)   atorvastatin tablet 20 mg (has no administration in time range)   ferrous sulfate tablet 1 each (has no administration in time range)   pantoprazole injection 80 mg (has no administration in time range)   COVID-19 vac, emelyn(Pfizer)(PF) (Pfizer COVID-19) 30 mcg/0.3 mL injection 0.3 mL (0.3 mLs Intramuscular Given 6/11/22 1443)     Medical Decision Making:   History:   Old Medical Records: I decided to obtain old medical records.  Old Records Summarized: records from clinic visits.       <> Summary of Records: Noted to be anemic on labs drawn 2021  Initial Assessment:   77-year-old male presenting for an episode bloody stool.  His vitals are normal, he appears pale but is in no acute distress.  Differential Diagnosis:   Suspect blood loss anemia secondary to lower GI bleed, considerations include diverticulosis, malignancy, internal hemorrhoids.  Lower suspicion for upper GI source as no melena.  Patient also appears to be chronically anemic for unclear reasons.  Concern for iron deficiency, etc..  He has no fever or diarrhea or abdominal pain to suggest colitis.  Independently  Interpreted Test(s):   I have ordered and independently interpreted EKG Reading(s) - see prior notes  Clinical Tests:   Lab Tests: Ordered and Reviewed  Medical Tests: Ordered and Reviewed  ED Management:  Will check labs and reassess.  Red is also requesting 2nd dose of Pfizer vaccine, will administer in the ED.    Labs notable for significant anemia with hemoglobin of 5.7.  Will transfuse in the ED and admit to Hospital Medicine for further management.  Patient and family comfortable with admission.  Other:   I have discussed this case with another health care provider.       <> Summary of the Discussion: See ED course            Attending Attestation:     Physician Attestation Statement for NP/PA:   I discussed this assessment and plan of this patient with the NP/PA, but I did not personally examine the patient. The face to face encounter was performed by the NP/PA.    Other NP/PA Attestation Additions:    History of Present Illness: 77-year-old man presents by EMS for evaluation of suspected lower GI bleeding.               ED Course as of 06/11/22 1651   Sat Jun 11, 2022   1452 Hemoglobin(!!): 5.7  Significant anemia, decreased from 7.9 on last check 1 year ago [CC]   1534 I discussed this patient with GI fellow, will admit to hospital medicine [CC]   1649 I discussed this patient with ED  as I have some concern about the level of care that he needs at home.  She recommends obtaining PT/OT evaluation as he may require placement.  PT/OT consult ordered. [CC]      ED Course User Index  [CC] Lyssa Alvarez PA-C             Clinical Impression:   Final diagnoses:  [K92.2] GI bleed (Primary)  [D64.9] Anemia requiring transfusions          ED Disposition Condition    Admit               Lyssa Alvarez PA-C  06/11/22 1651

## 2022-06-11 NOTE — PLAN OF CARE
Initial Discharge Planning Case Management Assessment:     Patient admitted on 6/11 for anemia    PCP updated in Epic: Gopal Hobbs MD. Sees virtually, last virtual visit 1 yr ago. Hasn't been in person for 2-3 years.   Baseline functioning: Bedridden for last 3-4 years. Requires full assist for transfers, uses bed pads.   Recent changes from baseline: Increased weakness, decreasing mobility.   HH/community service hx: Has [facility] RN (1x week). Also has bath aide (2x week)  Hx facility placement: Unclear  DME: Hospital bed, transfer board, walker (no longer uses), wheelchair (unable to propel self)  Psychosocial: Has support of brother and 2 nieces who all rotate coming 2x per day to help with daily hygiene, toileting, food, changing bed linens. Pt's niece, Kelsey, has POA.  Living environment: No TRACY, ramp to entry. Single level. Stays in bedroom in hospital bed.     Current dispo: Home w/increased caregiving support vs. SNF/NH placement  Barriers to d/c: Level of support at home  Transportation: Family    Consults following: Case Management, PT/OT  Referrals sent: None at this time    Actions completed today:   Chart reviewed. Care plan discussed with pt, pt's niece, care team.    GENEVA met with pt in [facility] to complete initial assessment. Pt states he lives alone but has family who come by to help him out with bathing and food. Pt also reports HH RN. Pt is unable to ambulate or transfer independently and is bedridden. Increased weakness. Still able to feed self when food is brought to him, but otherwise requiring total assist from family for ADLs. Pt does not want to leave his home, but would be agreeable to increased HH and caregiving support if available. No reports of elder abuse or neglect.     GENEVA spoke with pt's niece, Kelsey [phone], who states that she and her sister each come 1x per day for 4 hrs to assist pt in his home including bathing him, adolfo care, changing linens, and preparing food.  Kelsey states that pt also has [facility] RN who comes 1x per week and a bath aide who comes 2x per week. She has been trying to get more sitters and is waiting to hear back from someone at pt's insurance about approving this. She feels pt needs 24/7 caregiver support and that family is unable to provide that level of support to pt. She does not want to place him in a NH at this time and would like help arranging increase caregivers and HH for pt. She and pt do not have financial ability to privately pay for care giving. She is pt's POA and would like continued updates on discharge recommendations and support/resources that can be offered.     SW will continue to monitor and assist with any additional d/c needs as they arise.     Laverne Stringer [facility]  ED   Care Management  [facility]  Ext. [phone]    [facility]  Initial Discharge Assessment       Primary Care Provider: Gopal Hobbs MD    Admission Diagnosis: GI bleed [K92.2]    Admission Date: 6/11/2022  Expected Discharge Date:     Discharge Barriers Identified: None    Payor: HU[employer] MANAGED MEDICARE / Plan: HU[employer]NA SNP (SPECIAL NEEDS PLAN) / Product Type: Medicare Advantage /     Extended Emergency Contact Information  Primary Emergency Contact: Kelsey adair  Home Phone: [phone]  Mobile Phone: [phone]  Relation: Other  Secondary Emergency Contact: praneeth hodge  Mobile Phone: [phone]  Relation: Other    Discharge Plan A: Home with family, Home Health, Community Services  Discharge Plan B: New Nursing Home placement - senior living care facility      [facility] - [facility] [address]  [address] [address]  Phone: [phone] Fax: [phone]    [id number] [place] [address] EXPY AT [facility] OF [facility] & [address] [address]  Phone: [phone] Fax: [phone]      Initial Assessment (most recent)      Adult Discharge Assessment - 06/11/22 1727        Discharge Assessment    Assessment Type Discharge Planning Assessment     Confirmed/corrected address, phone number and insurance Yes     Confirmed Demographics Correct on Facesheet     Source of Information family;patient;health record     If unable to respond/provide information was family/caregiver contacted? Yes     Contact Name/Number Kelsey adair (Other)   [phone]     When was your last doctors appointment? --   1 year ago    Does patient/caregiver understand observation status Yes     Communicated PARI with patient/caregiver Yes     Reason For Admission Anemia, GI Bleed     Lives With alone     Do you expect to return to your current living situation? Yes     Do you have help at home or someone to help you manage your care at home? Yes     Who are your caregiver(s) and their phone number(s)? Family,      Prior to hospitilization cognitive status: Alert/Oriented;No Deficits     Current cognitive status: No Deficits;Alert/Oriented     Walking or Climbing Stairs Difficulty transferring difficulty, dependent;stair climbing difficulty, dependent;ambulation difficulty, dependent     Mobility Management Bedridden, requires full assist for transfers.     Dressing/Bathing Difficulty dressing difficulty, dependent;bathing difficulty, dependent     Dressing/Bathing Management 2x week bath aide, neices help daily     Do you have any problems with: Errands/Grocery;Needs other help     Specify other help food preparation, all ADLs     Home Accessibility wheelchair accessible     Home Layout Able to live on 1st floor     Equipment Currently Used at Home none     Readmission within 30 days? No     Patient currently being followed by outpatient case management? No     Do you currently have service(s) that help you manage your care at home? Yes     Name and Contact number of agency [facility]- RN 1x week     Is the pt/caregiver preference to resume services with  current agency Yes     Do you take prescription medications? Yes     Do you have prescription coverage? Yes     Coverage [employer] Medicare, Medicaid secondary     Do you have any problems affording any of your prescribed medications? No     Is the patient taking medications as prescribed? yes     Who is going to help you get home at discharge? Neice     How do you get to doctors appointments? family or friend will provide;other (see comments)   Virtual    Are you on dialysis? No     Do you take coumadin? No     Discharge Plan A Home with family;Home Health;Community Services     Discharge Plan B New Nursing Home placement - [facility] care facility     DME Needed Upon Discharge  none     Discharge Plan discussed with: Caregiver     Name(s) and Number(s) Kelsey adair (Other)   [phone]     Discharge Barriers Identified None

## 2022-06-11 NOTE — PLAN OF CARE
06/11/22 1730   Post-Acute Status   Post-Acute Authorization Placement;Home Health   Post-Acute Placement Status Pending medical clearance/testing   Home Health Status Pending medical clearance/testing   Coverage [employer] Medicare, Medicaid secondary   Discharge Plan   Discharge Plan A Home with family;Home Health;Community Services   Discharge Plan B New Nursing Home placement - [facility]DC care facility     PT/OT to see. Pt currently with [facility]. If unable to increase support at home, discuss NH placement with family/POA.     Laverne Stringer LMSW  ED   Care Management  [facility]  Ext. [phone]

## 2022-06-11 NOTE — HPI
Mr. Londono is a 77 year-old male with HTN, CKD, anemia, and debility who was brought to the ED on 06/11 following an episode of dark/red-appearing stool. He has been in his general state of health until today when he noticed the abnormal bowel movement. Denies associated abdominal pain, constipation, diarrhea, chest pain, shortness of breath. He has not had previous similar episodes. He additionally has never had a colonoscopy or endoscopy before.     ED course: On arrival he was afebrile, normotensive, non-tachycardic, with oxygen saturations at goal on room air. Labs revealed a hemoglobin of 5.7. He was transfused 1U PRBC and admitted to hospital medicine for further monitoring and management of a GI bleed.

## 2022-06-11 NOTE — ASSESSMENT & PLAN NOTE
Mr. Londono is a 77 year-old male with HTN, CKD, anemia, and debility presenting from home after an episode of dark red stools. Hemoglobin on arrival was 5.7 now s/p 1U PRBC. Denies associated abdominal pain, nausea, vomiting, diarrhea. No hx of liver disease, no previous GI bleeds or colonoscopies. Admitted for management of gi bleed.     PLAN:  --GI consulted  --NPO at midnight in anticipation of procedure  --Obtain 2 large bore IVs  --PPI IV 80mg x1, followed by PPI IV BID  --CBC q12hrs, transfuse if hg <7

## 2022-06-11 NOTE — H&P
[facility] Emergency Dept  [facility]  History & Physical    Patient Name: Gera Londono  MRN: [id number]  Patient Class: IP- Inpatient  Admission Date: 6/11/2022  Attending Physician: Yue Villagomez*   Primary Care Provider: Gopal Hobbs MD         Patient information was obtained from patient and ER records.     Subjective:     Principal Problem:GI bleed    Chief Complaint:   Chief Complaint   Patient presents with    Rectal Bleeding     X 1 day, niece reports this AM large amount of dark red stool, denies n/v        HPI: Mr. Londono is a 77 year-old male with HTN, CKD, anemia, and debility who was brought to the ED on 06/11 following an episode of dark/red-appearing stool. He has been in his general state of health until today when he noticed the abnormal bowel movement. Denies associated abdominal pain, constipation, diarrhea, chest pain, shortness of breath. He has not had previous similar episodes. He additionally has never had a colonoscopy or endoscopy before.     ED course: On arrival he was afebrile, normotensive, non-tachycardic, with oxygen saturations at goal on room air. Labs revealed a hemoglobin of 5.7. He was transfused 1U PRBC and admitted to hospital medicine for further monitoring and management of a GI bleed.     Past Medical History:   Diagnosis Date    Essential (primary) hypertension     Stage 3 chronic kidney disease      No past surgical history on file.    Review of patient's allergies indicates:  No Known Allergies    No current facility-administered medications on file prior to encounter.     Current Outpatient Medications on File Prior to Encounter   Medication Sig    acetaminophen (TYLENOL) 500 MG tablet Take 500 mg by mouth every 6 (six) hours as needed for Pain.    atorvastatin (LIPITOR) 20 MG tablet TAKE 1 TABLET EVERY DAY    docusate sodium (COLACE) 100 MG capsule TAKE 1 CAPSULE (100 MG TOTAL) 2 (TWO) TIMES DAILY AS NEEDED FOR CONSTIPATION. FOR CONSTIPATION  PREVENTION (DUE TO IRON)    ergocalciferol (ERGOCALCIFEROL) 50,000 unit Cap TAKE 1 CAPSULE EVERY 7 DAYS    FEROSUL 325 mg (65 mg iron) Tab tablet TAKE 1 TABLET (325 MG TOTAL) BY MOUTH 2 (TWO) TIMES DAILY. FOR ANEMIA    losartan (COZAAR) 25 MG tablet TAKE 1 TABLET EVERY DAY (NEED MD APPOINTMENT FOR REFILLS)    menthol 2.5 % Gel Apply 1 Bottle topically 3 (three) times daily as needed (>4/10 pain).    simethicone (MYLICON) 125 MG chewable tablet Take 125 mg by mouth every 6 (six) hours as needed for Flatulence.    walker Misc 1 each by Misc.(Non-Drug; Combo Route) route once daily.     Family History       Problem Relation (Age of Onset)    Arthritis Mother, Father, Brother    Diabetes Mother, Father, Sister    Heart disease Father    Hypertension Mother, Father, Sister          Tobacco Use    Smoking status: Former Smoker     Start date: 12/8/2000    Smokeless tobacco: Not on file   Substance and Sexual Activity    Alcohol use: Not on file    Drug use: No    Sexual activity: Not on file     Review of Systems   Constitutional:  Negative for activity change, chills, diaphoresis, fatigue and fever.   HENT:  Negative for congestion and sore throat.    Eyes:  Negative for photophobia and visual disturbance.   Respiratory:  Negative for cough and shortness of breath.    Cardiovascular:  Negative for chest pain, palpitations and leg swelling.   Gastrointestinal:  Positive for blood in stool. Negative for abdominal pain, constipation, diarrhea, nausea and vomiting.   Genitourinary:  Negative for dysuria and hematuria.   Musculoskeletal:  Negative for back pain and myalgias.   Skin:  Negative for color change and rash.   Neurological:  Negative for light-headedness and headaches.   Objective:     Vital Signs (Most Recent):  Temp: 97 °F (36.1 °C) (06/11/22 1742)  Pulse: 98 (06/11/22 1742)  Resp: 19 (06/11/22 1742)  BP: (!) 153/74 (06/11/22 1742)  SpO2: 100 % (06/11/22 1742)   Vital Signs (24h Range):  Temp:  [96.6  °F (35.9 °C)-98.6 °F (37 °C)] 97 °F (36.1 °C)  Pulse:  [] 98  Resp:  [14-20] 19  SpO2:  [100 %] 100 %  BP: (101-162)/(66-77) 153/74     Weight: 84.4 kg (186 lb)  Body mass index is 23.88 kg/m².    Physical Exam  Constitutional:       Appearance: Normal appearance.   HENT:      Head: Normocephalic and atraumatic.      Nose: No congestion or rhinorrhea.      Mouth/Throat:      Pharynx: No oropharyngeal exudate or posterior oropharyngeal erythema.   Eyes:      General: No scleral icterus.     Conjunctiva/sclera: Conjunctivae normal.   Cardiovascular:      Rate and Rhythm: Normal rate and regular rhythm.   Pulmonary:      Effort: Pulmonary effort is normal. No respiratory distress.      Breath sounds: Normal breath sounds. No wheezing or rales.   Abdominal:      General: There is no distension.      Tenderness: There is no abdominal tenderness.   Musculoskeletal:         General: No swelling.      Right lower leg: No edema.      Left lower leg: No edema.   Skin:     General: Skin is warm.      Coloration: Skin is not jaundiced.      Findings: No erythema or lesion.   Neurological:      General: No focal deficit present.      Mental Status: He is alert and oriented to person, place, and time.      Cranial Nerves: No cranial nerve deficit.         Significant Labs: All pertinent labs within the past 24 hours have been reviewed.    Significant Imaging: I have reviewed all pertinent imaging results/findings within the past 24 hours.    Assessment/Plan:     * GI bleed  Mr. Londono is a 77 year-old male with HTN, CKD, anemia, and debility presenting from home after an episode of dark red stools. Hemoglobin on arrival was 5.7 now s/p 1U PRBC. Denies associated abdominal pain, nausea, vomiting, diarrhea. No hx of liver disease, no previous GI bleeds or colonoscopies. Admitted for management of gi bleed.     PLAN:  --GI consulted  --NPO at midnight in anticipation of procedure  --Obtain 2 large bore IVs  --PPI IV 80mg x1,  followed by PPI IV BID  --CBC q12hrs, transfuse if hg <7    Age-related physical debility  --PT/OT consulted, suspect patient might require placement following hospitalization for conditioning purposes      CKD (chronic kidney disease) stage 3, GFR 30-59 ml/min  --creatinine/GFR at baseline  --avoid nephrotoxins, renally-dose medications    Hyperlipidemia  --resume home statin      Essential (primary) hypertension  --hold anti-hypertensives in the setting of a GIB  --resume when clinically indicated        VTE Risk Mitigation (From admission, onward)         Ordered     Place sequential compression device  Until discontinued         06/11/22 1631     Reason for No Pharmacological VTE Prophylaxis  Once        Question:  Reasons:  Answer:  Active Bleeding  Comment:  GI bleed    06/11/22 1631     IP VTE HIGH RISK PATIENT  Once         06/11/22 1631              Shelbie Duran MD  Department of Hospital Medicine   [facility] - Emergency Dept

## 2022-06-12 LAB
ALBUMIN SERPL BCP-MCNC: 2.8 G/DL (ref 3.5–5.2)
ALP SERPL-CCNC: 67 U/L (ref 55–135)
ALT SERPL W/O P-5'-P-CCNC: <5 U/L (ref 10–44)
ANION GAP SERPL CALC-SCNC: 9 MMOL/L (ref 8–16)
AST SERPL-CCNC: 9 U/L (ref 10–40)
BASOPHILS # BLD AUTO: 0.04 K/UL (ref 0–0.2)
BASOPHILS # BLD AUTO: 0.04 K/UL (ref 0–0.2)
BASOPHILS # BLD AUTO: 0.05 K/UL (ref 0–0.2)
BASOPHILS NFR BLD: 0.7 % (ref 0–1.9)
BASOPHILS NFR BLD: 0.8 % (ref 0–1.9)
BASOPHILS NFR BLD: 0.8 % (ref 0–1.9)
BILIRUB SERPL-MCNC: 0.8 MG/DL (ref 0.1–1)
BLD PROD TYP BPU: NORMAL
BLD PROD TYP BPU: NORMAL
BLOOD UNIT EXPIRATION DATE: NORMAL
BLOOD UNIT EXPIRATION DATE: NORMAL
BLOOD UNIT TYPE CODE: 1700
BLOOD UNIT TYPE CODE: 1700
BLOOD UNIT TYPE: NORMAL
BLOOD UNIT TYPE: NORMAL
BUN SERPL-MCNC: 14 MG/DL (ref 8–23)
CALCIUM SERPL-MCNC: 8.9 MG/DL (ref 8.7–10.5)
CHLORIDE SERPL-SCNC: 107 MMOL/L (ref 95–110)
CO2 SERPL-SCNC: 22 MMOL/L (ref 23–29)
CODING SYSTEM: NORMAL
CODING SYSTEM: NORMAL
CREAT SERPL-MCNC: 1 MG/DL (ref 0.5–1.4)
DIFFERENTIAL METHOD: ABNORMAL
DISPENSE STATUS: NORMAL
DISPENSE STATUS: NORMAL
EOSINOPHIL # BLD AUTO: 0.1 K/UL (ref 0–0.5)
EOSINOPHIL NFR BLD: 1.3 % (ref 0–8)
EOSINOPHIL NFR BLD: 2.3 % (ref 0–8)
EOSINOPHIL NFR BLD: 2.5 % (ref 0–8)
ERYTHROCYTE [DISTWIDTH] IN BLOOD BY AUTOMATED COUNT: 17.6 % (ref 11.5–14.5)
ERYTHROCYTE [DISTWIDTH] IN BLOOD BY AUTOMATED COUNT: 17.6 % (ref 11.5–14.5)
ERYTHROCYTE [DISTWIDTH] IN BLOOD BY AUTOMATED COUNT: 19.6 % (ref 11.5–14.5)
EST. GFR  (AFRICAN AMERICAN): >60 ML/MIN/1.73 M^2
EST. GFR  (NON AFRICAN AMERICAN): >60 ML/MIN/1.73 M^2
GLUCOSE SERPL-MCNC: 92 MG/DL (ref 70–110)
HCT VFR BLD AUTO: 23.8 % (ref 40–54)
HCT VFR BLD AUTO: 25.5 % (ref 40–54)
HCT VFR BLD AUTO: 28.6 % (ref 40–54)
HGB BLD-MCNC: 6.6 G/DL (ref 14–18)
HGB BLD-MCNC: 7 G/DL (ref 14–18)
HGB BLD-MCNC: 8.4 G/DL (ref 14–18)
IMM GRANULOCYTES # BLD AUTO: 0.01 K/UL (ref 0–0.04)
IMM GRANULOCYTES # BLD AUTO: 0.01 K/UL (ref 0–0.04)
IMM GRANULOCYTES # BLD AUTO: 0.02 K/UL (ref 0–0.04)
IMM GRANULOCYTES NFR BLD AUTO: 0.2 % (ref 0–0.5)
IMM GRANULOCYTES NFR BLD AUTO: 0.2 % (ref 0–0.5)
IMM GRANULOCYTES NFR BLD AUTO: 0.3 % (ref 0–0.5)
LYMPHOCYTES # BLD AUTO: 1.1 K/UL (ref 1–4.8)
LYMPHOCYTES # BLD AUTO: 1.2 K/UL (ref 1–4.8)
LYMPHOCYTES # BLD AUTO: 1.3 K/UL (ref 1–4.8)
LYMPHOCYTES NFR BLD: 17.7 % (ref 18–48)
LYMPHOCYTES NFR BLD: 23.6 % (ref 18–48)
LYMPHOCYTES NFR BLD: 25.6 % (ref 18–48)
MAGNESIUM SERPL-MCNC: 1.8 MG/DL (ref 1.6–2.6)
MCH RBC QN AUTO: 19.5 PG (ref 27–31)
MCH RBC QN AUTO: 19.5 PG (ref 27–31)
MCH RBC QN AUTO: 21.3 PG (ref 27–31)
MCHC RBC AUTO-ENTMCNC: 27.5 G/DL (ref 32–36)
MCHC RBC AUTO-ENTMCNC: 27.7 G/DL (ref 32–36)
MCHC RBC AUTO-ENTMCNC: 29.4 G/DL (ref 32–36)
MCV RBC AUTO: 70 FL (ref 82–98)
MCV RBC AUTO: 71 FL (ref 82–98)
MCV RBC AUTO: 72 FL (ref 82–98)
MONOCYTES # BLD AUTO: 0.4 K/UL (ref 0.3–1)
MONOCYTES # BLD AUTO: 0.4 K/UL (ref 0.3–1)
MONOCYTES # BLD AUTO: 0.6 K/UL (ref 0.3–1)
MONOCYTES NFR BLD: 7.3 % (ref 4–15)
MONOCYTES NFR BLD: 8.1 % (ref 4–15)
MONOCYTES NFR BLD: 8.2 % (ref 4–15)
NEUTROPHILS # BLD AUTO: 3 K/UL (ref 1.8–7.7)
NEUTROPHILS # BLD AUTO: 3.1 K/UL (ref 1.8–7.7)
NEUTROPHILS # BLD AUTO: 5.5 K/UL (ref 1.8–7.7)
NEUTROPHILS NFR BLD: 63.6 % (ref 38–73)
NEUTROPHILS NFR BLD: 65 % (ref 38–73)
NEUTROPHILS NFR BLD: 71.8 % (ref 38–73)
NRBC BLD-RTO: 0 /100 WBC
PHOSPHATE SERPL-MCNC: 2.9 MG/DL (ref 2.7–4.5)
PLATELET # BLD AUTO: 212 K/UL (ref 150–450)
PLATELET # BLD AUTO: 231 K/UL (ref 150–450)
PLATELET # BLD AUTO: 257 K/UL (ref 150–450)
PMV BLD AUTO: 8.4 FL (ref 9.2–12.9)
PMV BLD AUTO: 8.5 FL (ref 9.2–12.9)
PMV BLD AUTO: 8.8 FL (ref 9.2–12.9)
POTASSIUM SERPL-SCNC: 3.8 MMOL/L (ref 3.5–5.1)
PROT SERPL-MCNC: 7.6 G/DL (ref 6–8.4)
RBC # BLD AUTO: 3.38 M/UL (ref 4.6–6.2)
RBC # BLD AUTO: 3.59 M/UL (ref 4.6–6.2)
RBC # BLD AUTO: 3.95 M/UL (ref 4.6–6.2)
SODIUM SERPL-SCNC: 138 MMOL/L (ref 136–145)
TRANS ERYTHROCYTES VOL PATIENT: NORMAL ML
TRANS ERYTHROCYTES VOL PATIENT: NORMAL ML
WBC # BLD AUTO: 4.77 K/UL (ref 3.9–12.7)
WBC # BLD AUTO: 4.83 K/UL (ref 3.9–12.7)
WBC # BLD AUTO: 7.59 K/UL (ref 3.9–12.7)

## 2022-06-12 PROCEDURE — 25000003 PHARM REV CODE 250: Performed by: PHYSICIAN ASSISTANT

## 2022-06-12 PROCEDURE — 11000001 HC ACUTE MED/SURG PRIVATE ROOM

## 2022-06-12 PROCEDURE — 25000003 PHARM REV CODE 250: Performed by: STUDENT IN AN ORGANIZED HEALTH CARE EDUCATION/TRAINING PROGRAM

## 2022-06-12 PROCEDURE — P9021 RED BLOOD CELLS UNIT: HCPCS | Performed by: PHYSICIAN ASSISTANT

## 2022-06-12 PROCEDURE — 85025 COMPLETE CBC W/AUTO DIFF WBC: CPT | Mod: 91

## 2022-06-12 PROCEDURE — 99233 PR SUBSEQUENT HOSPITAL CARE,LEVL III: ICD-10-PCS | Mod: GC,,, | Performed by: HOSPITALIST

## 2022-06-12 PROCEDURE — 63600175 PHARM REV CODE 636 W HCPCS: Performed by: STUDENT IN AN ORGANIZED HEALTH CARE EDUCATION/TRAINING PROGRAM

## 2022-06-12 PROCEDURE — 83735 ASSAY OF MAGNESIUM: CPT | Performed by: STUDENT IN AN ORGANIZED HEALTH CARE EDUCATION/TRAINING PROGRAM

## 2022-06-12 PROCEDURE — 80053 COMPREHEN METABOLIC PANEL: CPT | Performed by: STUDENT IN AN ORGANIZED HEALTH CARE EDUCATION/TRAINING PROGRAM

## 2022-06-12 PROCEDURE — 99233 SBSQ HOSP IP/OBS HIGH 50: CPT | Mod: GC,,, | Performed by: HOSPITALIST

## 2022-06-12 PROCEDURE — 97166 OT EVAL MOD COMPLEX 45 MIN: CPT

## 2022-06-12 PROCEDURE — 84100 ASSAY OF PHOSPHORUS: CPT | Performed by: STUDENT IN AN ORGANIZED HEALTH CARE EDUCATION/TRAINING PROGRAM

## 2022-06-12 PROCEDURE — 97162 PT EVAL MOD COMPLEX 30 MIN: CPT

## 2022-06-12 PROCEDURE — 85025 COMPLETE CBC W/AUTO DIFF WBC: CPT | Mod: 91 | Performed by: HOSPITALIST

## 2022-06-12 PROCEDURE — 85025 COMPLETE CBC W/AUTO DIFF WBC: CPT | Mod: 91 | Performed by: STUDENT IN AN ORGANIZED HEALTH CARE EDUCATION/TRAINING PROGRAM

## 2022-06-12 PROCEDURE — 36415 COLL VENOUS BLD VENIPUNCTURE: CPT | Performed by: STUDENT IN AN ORGANIZED HEALTH CARE EDUCATION/TRAINING PROGRAM

## 2022-06-12 PROCEDURE — 85025 COMPLETE CBC W/AUTO DIFF WBC: CPT | Performed by: STUDENT IN AN ORGANIZED HEALTH CARE EDUCATION/TRAINING PROGRAM

## 2022-06-12 RX ORDER — PANTOPRAZOLE SODIUM 40 MG/1
40 TABLET, DELAYED RELEASE ORAL 2 TIMES DAILY
Status: DISCONTINUED | OUTPATIENT
Start: 2022-06-12 | End: 2022-06-13

## 2022-06-12 RX ORDER — CETIRIZINE HYDROCHLORIDE 10 MG/1
10 TABLET ORAL DAILY PRN
COMMUNITY

## 2022-06-12 RX ORDER — POLYETHYLENE GLYCOL 3350, SODIUM SULFATE ANHYDROUS, SODIUM BICARBONATE, SODIUM CHLORIDE, POTASSIUM CHLORIDE 236; 22.74; 6.74; 5.86; 2.97 G/4L; G/4L; G/4L; G/4L; G/4L
4000 POWDER, FOR SOLUTION ORAL ONCE
Status: COMPLETED | OUTPATIENT
Start: 2022-06-12 | End: 2022-06-12

## 2022-06-12 RX ORDER — CHOLECALCIFEROL (VITAMIN D3) 25 MCG
1000 TABLET ORAL DAILY
Status: DISCONTINUED | OUTPATIENT
Start: 2022-06-12 | End: 2022-06-17 | Stop reason: HOSPADM

## 2022-06-12 RX ORDER — HYDROCODONE BITARTRATE AND ACETAMINOPHEN 500; 5 MG/1; MG/1
TABLET ORAL
Status: DISCONTINUED | OUTPATIENT
Start: 2022-06-12 | End: 2022-06-17 | Stop reason: HOSPADM

## 2022-06-12 RX ORDER — MAGNESIUM SULFATE HEPTAHYDRATE 40 MG/ML
2 INJECTION, SOLUTION INTRAVENOUS ONCE
Status: COMPLETED | OUTPATIENT
Start: 2022-06-12 | End: 2022-06-12

## 2022-06-12 RX ADMIN — MAGNESIUM SULFATE HEPTAHYDRATE 2 G: 2 INJECTION, SOLUTION INTRAVENOUS at 09:06

## 2022-06-12 RX ADMIN — POLYETHYLENE GLYCOL 3350, SODIUM SULFATE ANHYDROUS, SODIUM BICARBONATE, SODIUM CHLORIDE, POTASSIUM CHLORIDE 4000 ML: 236; 22.74; 6.74; 5.86; 2.97 POWDER, FOR SOLUTION ORAL at 06:06

## 2022-06-12 RX ADMIN — FERROUS SULFATE TAB 325 MG (65 MG ELEMENTAL FE) 1 EACH: 325 (65 FE) TAB at 08:06

## 2022-06-12 RX ADMIN — PANTOPRAZOLE SODIUM 40 MG: 40 TABLET, DELAYED RELEASE ORAL at 08:06

## 2022-06-12 RX ADMIN — Medication 1000 UNITS: at 08:06

## 2022-06-12 RX ADMIN — ATORVASTATIN CALCIUM 20 MG: 20 TABLET, FILM COATED ORAL at 08:06

## 2022-06-12 RX ADMIN — MICONAZOLE NITRATE 2 % TOPICAL POWDER: at 09:06

## 2022-06-12 RX ADMIN — MICONAZOLE NITRATE 2 % TOPICAL POWDER: at 11:06

## 2022-06-12 NOTE — CONSULTS
[facility] S[facility]  Gastroenterology  Consult Note    Patient Name: Gera Londono  MRN: [id number]  Admission Date: 6/11/2022  Hospital Length of Stay: 1 days  Code Status: Full Code   Attending Provider: Yue Villagomez*   Consulting Provider: Raciel Diaz MD  Primary Care Physician: Gopal Hobbs MD  Principal Problem:GI bleed    Inpatient consult to Gastroenterology  Consult performed by: Raciel Diaz MD  Consult ordered by: Lyssa Alvarez PA-C        Subjective:     HPI:  Mr. Gera Londono is a 77 year old male for whom GI is consulted with concern for GI bleeding. He has a PMH significant for HTN and iron deficiency anemia.     Patient reports onset of one episode of maroon colored stool on 06/11/2022. He denies symptom association with abdominal pain, preceding melena, nausea, vomiting, dysphagia, or NSAID usage. He also denies prior history of GI bleeding or family history of gastric or colon cancer, and he has never undergone EGD or colonoscopy previously. Due to stool discoloration, he reported to ED here on 06/11.     Hospital Course: On arrival, vital signs normal on room air. Labs notable for acute on chronic anemia (Hgb 5.7 from 7.9 in 04/2021), normal platelets, normal BUN, and normal LFT's. He was admitted to hospital medicine, given PRBC, and GI consulted. No further bowel movements since admission.         Past Medical History:   Diagnosis Date    Essential (primary) hypertension     Stage 3 chronic kidney disease        No past surgical history on file.    Review of patient's allergies indicates:  No Known Allergies  Family History       Problem Relation (Age of Onset)    Arthritis Mother, Father, Brother    Diabetes Mother, Father, Sister    Heart disease Father    Hypertension Mother, Father, Sister          Tobacco Use    Smoking status: Former Smoker     Start date: 12/8/2000    Smokeless tobacco: Not on file   Substance and Sexual Activity    Alcohol use: Not  on file    Drug use: No    Sexual activity: Not on file     Review of Systems   Constitutional:  Negative for appetite change, chills and fever.   HENT:  Negative for congestion, sore throat and trouble swallowing.    Eyes:  Negative for photophobia, pain and discharge.   Respiratory:  Negative for cough and shortness of breath.    Cardiovascular:  Negative for chest pain and palpitations.   Gastrointestinal:  Positive for blood in stool. Negative for abdominal pain, diarrhea, nausea and vomiting.   Genitourinary:  Negative for difficulty urinating.   Musculoskeletal:  Positive for gait problem. Negative for back pain, myalgias and neck pain.   Skin:  Negative for pallor and rash.   Neurological:  Positive for weakness. Negative for light-headedness, numbness and headaches.   Objective:     Vital Signs (Most Recent):  Temp: 96.5 °F (35.8 °C) (06/12/22 0802)  Pulse: 72 (06/12/22 0802)  Resp: 17 (06/12/22 0802)  BP: 128/72 (06/12/22 0802)  SpO2: 100 % (06/12/22 0802) Vital Signs (24h Range):  Temp:  [96.5 °F (35.8 °C)-98.7 °F (37.1 °C)] 96.5 °F (35.8 °C)  Pulse:  [] 72  Resp:  [14-20] 17  SpO2:  [100 %] 100 %  BP: (101-176)/(66-83) 128/72     Weight: 84.4 kg (186 lb) (06/11/22 1325)  Body mass index is 23.88 kg/m².    No intake or output data in the 24 hours ending 06/12/22 0852    Lines/Drains/Airways       Peripheral Intravenous Line  Duration                  Peripheral IV - Single Lumen 06/11/22 1700 20 G Anterior;Proximal;Right Forearm <1 day                    Physical Exam  Constitutional:       Appearance: Normal appearance. He is not ill-appearing.   Eyes:      General: No scleral icterus.     Conjunctiva/sclera: Conjunctivae normal.   Cardiovascular:      Rate and Rhythm: Normal rate and regular rhythm.      Pulses: Normal pulses.      Heart sounds: Normal heart sounds.   Pulmonary:      Effort: Pulmonary effort is normal. No respiratory distress.      Breath sounds: Normal breath sounds.    Abdominal:      General: Bowel sounds are normal. There is no distension.      Palpations: Abdomen is soft.      Tenderness: There is no abdominal tenderness.   Skin:     General: Skin is warm and dry.      Findings: No bruising or rash.   Neurological:      Mental Status: He is alert and oriented to person, place, and time.       Significant Labs:  All pertinent lab results from the last 24 hours have been reviewed.    Significant Imaging:  Imaging results within the past 24 hours have been reviewed.    Assessment/Plan:     * GI bleed  This is a 77 year old  male with a PMH significant for HTN and iron deficiency anemia who presented on 06/11/2022 following acute onset of maroon colored stool. His presentation is notable for acute on chronic anemia (Hgb 5.7 from 7.9 in 04/2021), however he is hemodynamically stable and without recurrent bleeding. Etiology of bleeding suspected to be from lower GI source given overall stability making a brisk upper source unlikely, however his iron deficiency anemia has never been endoscopically investigated.     Recommendations:     -Will plan for EGD/colonoscopy on 06/13/2022 for further evaluation.   -Okay for clear liquid diet today and then NPO for 06/13.   -Bowel prep ordered for this evening.   -Continue Protonix 40 mg IV BID.   -Continue to transfuse to maintain Hgb >7.           Thank you for your consult. I will follow-up with patient. Please contact us if you have any additional questions.    Raciel Diaz MD  Gastroenterology  [facility] - Med Surg

## 2022-06-12 NOTE — PHARMACY MED REC
"  Admission Medication History     The home medication history was taken by Brigid Ansari.    You may go to "Admission" then "Reconcile Home Medications" tabs to review and/or act upon these items.      The home medication list has been updated by the Pharmacy department.    Please read ALL comments highlighted in yellow.    Please address this information as you see fit.     Feel free to contact us if you have any questions or require assistance.      The medications listed below were removed from the home medication list. Please reorder if appropriate:  Patient reports no longer taking the following medication(s):   DOCUSATE 100MG   FEROSUL 325MG   MENTHOL 2.5% GEL   SIMETHICONE 125MG    Medications listed below were obtained from: Patient/family  PTA Medications   Medication Sig    acetaminophen (TYLENOL) 500 MG tablet   Take 500 mg by mouth 2 (two) times a day.    atorvastatin (LIPITOR) 20 MG tablet   TAKE 1 TABLET EVERY DAY    cetirizine (ZYRTEC) 10 MG tablet   Take 10 mg by mouth daily as needed for Allergies.    ergocalciferol (ERGOCALCIFEROL) 50,000 unit Cap   Take 50,000 Units by mouth every Monday.    losartan (COZAAR) 25 MG tablet   TAKE 1 TABLET EVERY DAY (NEED MD APPOINTMENT FOR REFILLS)    walker Misc 1 each by Misc.(Non-Drug; Combo Route) route once daily.       Potential issues to be addressed PRIOR TO DISCHARGE  Please discuss with the patient barriers to adherence with medication treatment plans  Patient requires education regarding drug therapies     Brigid Ansari  EXT [id number]                .          "

## 2022-06-12 NOTE — PT/OT/SLP EVAL
Occupational Therapy   Co-Evaluation with PT    Name: Gera Londono  MRN: [id number]  Admitting Diagnosis:  GI bleed  Recent Surgery: * No surgery found *      Recommendations:     Discharge Recommendations: home health OT, home health PT  Discharge Equipment Recommendations:  none  Barriers to discharge:  None    Assessment:     Gera Londono is a 77 y.o. male with a medical diagnosis of GI bleed.  He presents with good motivation and participation. Pt tolerated session well, limited by low H&H and blood transfusion. Performance deficits affecting function: weakness, impaired endurance, impaired functional mobilty, impaired self care skills, gait instability, impaired balance.  Pt would benefit from skilled OT services in order to maximize independence with ADLs and facilitate safe discharge. Pt would benefit from home health OT once medically stable for discharge.     Rehab Prognosis: Good; patient would benefit from acute skilled OT services to address these deficits and reach maximum level of function.       Plan:     Patient to be seen 3 x/week to address the above listed problems via self-care/home management, therapeutic activities, therapeutic exercises  · Plan of Care Expires: 07/12/22  · Plan of Care Reviewed with: patient    Subjective     Chief Complaint: no c/o  Patient/Family Comments/goals: to return home    Occupational Profile:  Pt lives alone and has an aide 2xs/week for bathing and his nieces assist 4 hours/day per chart. Prior to admission, patients level of function was requires assist for ADLs and transfers to a w/c; mostly bed bound per pt. He performs slide board to transfers to wheelchair and propels wheelchair with B UE  Equipment used at home: hospital bed, slide board, wheelchair.  Upon discharge, patient will have assistance from aide and family.      Pain/Comfort:  · Pain Rating 1: 0/10  · Pain Rating Post-Intervention 1: 0/10    Patients cultural, spiritual, [employer] conflicts given  the current situation: no    Objective:     Communicated with: RN and PT prior to session.  Patient found HOB elevated with peripheral IV, telemetry, bed alarm upon OT entry to room.    General Precautions: Standard, fall   Orthopedic Precautions:N/A   Braces: N/A  Respiratory Status: Room air    Occupational Performance:    Bed Mobility:    · Patient completed Scooting/Bridging with moderate assistance to the L along EOB  · Patient completed Supine to Sit with minimum assistance  · Patient completed Sit to Supine with contact guard assistance    Activities of Daily Living:  · Grooming: stand by assistance to perform facial hygiene EOB    Cognitive/Visual Perceptual:  Cognitive/Psychosocial Skills:     -       Oriented to: Person and Place   -       Follows Commands/attention:Follows one-step commands  -       Communication: clear/fluent  -       Memory: Poor immediate recall  -       Safety awareness/insight to disability: intact   -       Mood/Affect/Coping skills/emotional control: Appropriate to situation    Physical Exam:  Sensation:    -       Intact  Upper Extremity Range of Motion:     -       Right Upper Extremity: WFL  -       Left Upper Extremity: WFL  Upper Extremity Strength:    -       Right Upper Extremity: WFL  -       Left Upper Extremity: WFL   Strength:    -       Right Upper Extremity: WFL  -       Left Upper Extremity: WFL    AMPAC 6 Click ADL:  AMPAC Total Score: 15    Treatment & Education:  -Therapist provided facilitation and instruction of proper body mechanics, energy conservation, and fall prevention strategies during tasks listed above.  -Pt educated on role of OT, POC and goals for therapy  -Pt educated on importance of OOB activities with staff member assistance and sitting OOB majority of the day.   -Pt verbalized understanding. Pt expressed no further concerns/questions  -Whiteboard updated  Evaluation completed with PT to best establish plan of care for acute setting.    Education:    Patient left HOB elevated with all lines intact, call button in reach and RN present    GOALS:   Multidisciplinary Problems     Occupational Therapy Goals        Problem: Occupational Therapy    Goal Priority Disciplines Outcome Interventions   Occupational Therapy Goal     OT, PT/OT Ongoing, Progressing    Description: Goals to be met by: 6/26/22     Patient will increase functional independence with ADLs by performing:    Feeding with [place].  UE Dressing with Moderate Assistance.  LE Dressing with Moderate Assistance.  Grooming while EOB with Minimal Assistance.  Slide board t/f with min A                   History:     Past Medical History:   Diagnosis Date    Essential (primary) hypertension     Stage 3 chronic kidney disease        No past surgical history on file.    Time Tracking:     OT Date of Treatment: 06/12/22  OT Start Time: 0949  OT Stop Time: 1000  OT Total Time (min): 11 min    Billable Minutes:Evaluation 11    6/12/2022

## 2022-06-12 NOTE — PROGRESS NOTES
[facility] Medicine  Progress Note    Patient Name: Gera Londono  MRN: [id number]  Patient Class: IP- Inpatient   Admission Date: 6/11/2022  Length of Stay: 1 days  Attending Physician: Yue Villagomez*  Primary Care Provider: Gopal Hobbs MD        Subjective:     Principal Problem:GI bleed        HPI:  Mr. Londono is a 77 year-old male with HTN, CKD, anemia, and debility who was brought to the ED on 06/11 following an episode of dark/red-appearing stool. He has been in his general state of health until today when he noticed the abnormal bowel movement. Denies associated abdominal pain, constipation, diarrhea, chest pain, shortness of breath. He has not had previous similar episodes. He additionally has never had a colonoscopy or endoscopy before.     ED course: On arrival he was afebrile, normotensive, non-tachycardic, with oxygen saturations at goal on room air. Labs revealed a hemoglobin of 5.7. He was transfused 1U PRBC and admitted to hospital medicine for further monitoring and management of a GI bleed.       Overview/Hospital Course:  Admitted to hospital medicine for evaluation of GI bleeding. Transfused 2u pRBC. GI consulted, suspect lower bleeding source. Planning for EGD/colonoscopy.       Interval History: No acute events overnight, afebrile, hemodynamically stable. He had another episode of hematochezia this morning. Hemoglobin improved to 6.6 yesterday after 1u pRBC transfusion so additional unit was transfused.       Review of Systems   Constitutional:  Negative for chills and fever.   HENT:  Negative for congestion and rhinorrhea.    Eyes:  Negative for discharge and visual disturbance.   Respiratory:  Negative for cough and shortness of breath.    Cardiovascular:  Negative for chest pain and leg swelling.   Gastrointestinal:  Positive for blood in stool. Negative for abdominal pain, constipation, diarrhea, nausea, rectal pain and vomiting.   Genitourinary:  Negative  for difficulty urinating and dysuria.   Musculoskeletal:  Negative for arthralgias, back pain and myalgias.   Skin:  Negative for pallor and rash.   Neurological:  Negative for dizziness and headaches.   Psychiatric/Behavioral:  Negative for sleep disturbance.    Objective:     Vital Signs (Most Recent):  Temp: 97.2 °F (36.2 °C) (06/12/22 1151)  Pulse: 76 (06/12/22 1151)  Resp: 18 (06/12/22 1151)  BP: (!) 165/78 (06/12/22 1151)  SpO2: 99 % (06/12/22 1151) Vital Signs (24h Range):  Temp:  [96.1 °F (35.6 °C)-98.7 °F (37.1 °C)] 97.2 °F (36.2 °C)  Pulse:  [] 76  Resp:  [14-20] 18  SpO2:  [99 %-100 %] 99 %  BP: (101-176)/(66-83) 165/78     Weight: 84.4 kg (186 lb)  Body mass index is 23.88 kg/m².    Intake/Output Summary (Last 24 hours) at 6/12/2022 1204  Last data filed at 6/12/2022 1151  Gross per 24 hour   Intake 3136.75 ml   Output --   Net 3136.75 ml      Physical Exam  Vitals and nursing note reviewed.   Constitutional:       General: He is not in acute distress.     Appearance: Normal appearance. He is normal weight. He is not ill-appearing, toxic-appearing or diaphoretic.   HENT:      Head: Normocephalic and atraumatic.   Eyes:      General: No scleral icterus.        Right eye: No discharge.         Left eye: No discharge.   Cardiovascular:      Rate and Rhythm: Normal rate and regular rhythm.      Pulses: Normal pulses.      Heart sounds: Normal heart sounds.   Pulmonary:      Effort: Pulmonary effort is normal. No respiratory distress.   Abdominal:      General: Abdomen is flat. Bowel sounds are normal. There is no distension.      Tenderness: There is no abdominal tenderness. There is no guarding.   Musculoskeletal:      Cervical back: No rigidity.      Right lower leg: No edema.      Left lower leg: No edema.   Skin:     General: Skin is warm and dry.      Capillary Refill: Capillary refill takes less than 2 seconds.      Coloration: Skin is not jaundiced.   Neurological:      General: No focal deficit  present.      Mental Status: He is alert and oriented to person, place, and time. Mental status is at baseline.   Psychiatric:         Mood and Affect: Mood normal.         Behavior: Behavior normal.     Significant Labs: All pertinent labs within the past 24 hours have been reviewed.  CBC:   Recent Labs   Lab 06/11/22 1420 06/12/22  0000 06/12/22  0419   WBC 5.90 4.83 4.77   HGB 5.7* 6.6* 7.0*   HCT 21.9* 23.8* 25.5*    231 257     CMP:   Recent Labs   Lab 06/11/22  1420 06/12/22  0419    138   K 3.7 3.8    107   CO2 22* 22*   * 92   BUN 16 14   CREATININE 1.1 1.0   CALCIUM 9.1 8.9   PROT 8.1 7.6   ALBUMIN 2.9* 2.8*   BILITOT 0.4 0.8   ALKPHOS 71 67   AST 9* 9*   ALT <5* <5*   ANIONGAP 12 9   EGFRNONAA >60.0 >60.0     Magnesium:   Recent Labs   Lab 06/12/22  0419   MG 1.8     Phosphorous:  Recent Labs   Lab 06/12/22 0419   PHOS 2.9       Significant Imaging: I have reviewed all pertinent imaging results/findings within the past 24 hours.    Inpatient Medications:  Continuous Infusions:  Scheduled Meds:   atorvastatin  20 mg Oral Daily    ferrous sulfate  1 tablet Oral Daily    miconazole NITRATE 2 %   Topical (Top) BID    pantoprazole  40 mg Oral BID    polyethylene glycol  4,000 mL Oral Once    vitamin D  1,000 Units Oral Daily     PRN Meds:sodium chloride, sodium chloride, melatonin, ondansetron, sodium chloride 0.9%        Assessment/Plan:      * GI bleed  Mr. Londono is a 77 year-old male with HTN, CKD, anemia, and debility presenting from home after an episode of dark red stools. Hemoglobin on arrival was 5.7 now s/p 1U PRBC. Denies associated abdominal pain, nausea, vomiting, diarrhea. No hx of liver disease, no previous GI bleeds or colonoscopies. Denies family history of colon cancer or other malignancies. Admitted for management of GI bleed. GI consulted, suspect lower source of GI bleed.    Recent Labs     06/11/22 1420 06/12/22  0000 06/12/22 0419   HGB 5.7* 6.6* 7.0*    HCT 21.9* 23.8* 25.5*       PLAN:  -Gastroenterology consulted, planning for EGD/colonoscopy on 06/13/2022 for further evaluation.   --Okay for clear liquid diet today and then NPO for 06/13.    --Bowel prep ordered for this evening.    --Continue Protonix 40 mg IV BID.   --CBC q12hrs, transfuse if hgb <7    Essential (primary) hypertension  --hold anti-hypertensives in the setting of a GIB  --resume when clinically indicated      Age-related physical debility  Per Social Work assessment Patient currently has a HH RN through [facility] . He is unable to ambulate independently and is bedridden. Able to self feed but requires assistance for ADLs.No reports of elder abuse or neglect.      PLAN  --Social work consulted, following for possibility of increased HH and caregiving support if available as patient does not prefer nursing home placement. If unable, plan to discuss NH placement with family/POA.  --PT/OT consulted    CKD (chronic kidney disease) stage 3, GFR 30-59 ml/min  --creatinine/GFR at baseline  --avoid nephrotoxins, renally-dose medications    Vitamin D deficiency  - vitamin D supplement qd    Hyperlipidemia  --resume home statin      VTE Risk Mitigation (From admission, onward)         Ordered     Place sequential compression device  Until discontinued         06/11/22 1631     Reason for No Pharmacological VTE Prophylaxis  Once        Question:  Reasons:  Answer:  Active Bleeding  Comment:  GI bleed    06/11/22 1631     IP VTE HIGH RISK PATIENT  Once         06/11/22 1631                Discharge Planning   PARI:      Code Status: Full Code   Is the patient medically ready for discharge?: No    Reason for patient still in hospital (select all that apply): Patient trending condition, Consult recommendations and PT / OT recommendations  Discharge Plan A: Home with family, Home Health, Community Services            Denver Worthy DO  Department of Hospital Medicine   [facility]

## 2022-06-12 NOTE — ASSESSMENT & PLAN NOTE
Per Social Work assessment Patient currently has a HH RN through [facility] . He is unable to ambulate independently and is bedridden. Able to self feed but requires assistance for ADLs.No reports of elder abuse or neglect.      PLAN  --Social work consulted, following for possibility of increased HH and caregiving support if available as patient does not prefer nursing home placement. If unable, plan to discuss NH placement with family/POA.  --PT/OT consulted

## 2022-06-12 NOTE — ASSESSMENT & PLAN NOTE
This is a 77 year old  male with a PMH significant for HTN and iron deficiency anemia who presented on 06/11/2022 following acute onset of maroon colored stool. His presentation is notable for acute on chronic anemia (Hgb 5.7 from 7.9 in 04/2021), however he is hemodynamically stable and without recurrent bleeding. Etiology of bleeding suspected to be from lower GI source given overall stability making a brisk upper source unlikely, however his iron deficiency anemia has never been endoscopically investigated.     Recommendations:     -Will plan for EGD/colonoscopy on 06/13/2022 for further evaluation.   -Okay for clear liquid diet today and then NPO for 06/13.   -Bowel prep ordered for this evening.   -Continue Protonix 40 mg IV BID.   -Continue to transfuse to maintain Hgb >7.

## 2022-06-12 NOTE — SUBJECTIVE & OBJECTIVE
Past Medical History:   Diagnosis Date    Essential (primary) hypertension     Stage 3 chronic kidney disease        No past surgical history on file.    Review of patient's allergies indicates:  No Known Allergies  Family History       Problem Relation (Age of Onset)    Arthritis Mother, Father, Brother    Diabetes Mother, Father, Sister    Heart disease Father    Hypertension Mother, Father, Sister          Tobacco Use    Smoking status: Former Smoker     Start date: 12/8/2000    Smokeless tobacco: Not on file   Substance and Sexual Activity    Alcohol use: Not on file    Drug use: No    Sexual activity: Not on file     Review of Systems   Constitutional:  Negative for appetite change, chills and fever.   HENT:  Negative for congestion, sore throat and trouble swallowing.    Eyes:  Negative for photophobia, pain and discharge.   Respiratory:  Negative for cough and shortness of breath.    Cardiovascular:  Negative for chest pain and palpitations.   Gastrointestinal:  Positive for blood in stool. Negative for abdominal pain, diarrhea, nausea and vomiting.   Genitourinary:  Negative for difficulty urinating.   Musculoskeletal:  Positive for gait problem. Negative for back pain, myalgias and neck pain.   Skin:  Negative for pallor and rash.   Neurological:  Positive for weakness. Negative for light-headedness, numbness and headaches.   Objective:     Vital Signs (Most Recent):  Temp: 96.5 °F (35.8 °C) (06/12/22 0802)  Pulse: 72 (06/12/22 0802)  Resp: 17 (06/12/22 0802)  BP: 128/72 (06/12/22 0802)  SpO2: 100 % (06/12/22 0802) Vital Signs (24h Range):  Temp:  [96.5 °F (35.8 °C)-98.7 °F (37.1 °C)] 96.5 °F (35.8 °C)  Pulse:  [] 72  Resp:  [14-20] 17  SpO2:  [100 %] 100 %  BP: (101-176)/(66-83) 128/72     Weight: 84.4 kg (186 lb) (06/11/22 1325)  Body mass index is 23.88 kg/m².    No intake or output data in the 24 hours ending 06/12/22 0852    Lines/Drains/Airways       Peripheral Intravenous Line  Duration                   Peripheral IV - Single Lumen 06/11/22 1700 20 G Anterior;Proximal;Right Forearm <1 day                    Physical Exam  Constitutional:       Appearance: Normal appearance. He is not ill-appearing.   Eyes:      General: No scleral icterus.     Conjunctiva/sclera: Conjunctivae normal.   Cardiovascular:      Rate and Rhythm: Normal rate and regular rhythm.      Pulses: Normal pulses.      Heart sounds: Normal heart sounds.   Pulmonary:      Effort: Pulmonary effort is normal. No respiratory distress.      Breath sounds: Normal breath sounds.   Abdominal:      General: Bowel sounds are normal. There is no distension.      Palpations: Abdomen is soft.      Tenderness: There is no abdominal tenderness.   Skin:     General: Skin is warm and dry.      Findings: No bruising or rash.   Neurological:      Mental Status: He is alert and oriented to person, place, and time.       Significant Labs:  All pertinent lab results from the last 24 hours have been reviewed.    Significant Imaging:  Imaging results within the past 24 hours have been reviewed.

## 2022-06-12 NOTE — ED NOTES
, Report given to Natacha/CHENTE .Pt transported via stretcher by transported with consent (blood) and informed to give to Vivian/CHENTE . Pt is stable

## 2022-06-12 NOTE — PT/OT/SLP EVAL
"Physical Therapy Evaluation/co eval with OT    Patient Name:  Gera Londono   MRN:  [id number]    Recommendations:     Discharge Recommendations:  home with home health   Discharge Equipment Recommendations: none   Barriers to discharge: Decreased caregiver support lives alone;     Assessment:     Gera Londono is a 77 y.o. male admitted with a medical diagnosis of GI bleed.  He presents with the following impairments/functional limitations:  weakness, impaired functional mobilty, impaired self care skills, decreased lower extremity function . Pt is unsafe with functional mobility at this time due to pt requires minimal assist for bed mobility, moderate assist for scooting to the L in sitting, and supervision for sitting balance due to weakness and instability. Pt is motivated to progress with functional mobility.    Rehab Prognosis: Fair; patient would benefit from acute skilled PT services to address these deficits and reach maximum level of function.    Recent Surgery: * No surgery found *      Plan:     During this hospitalization, patient to be seen 3 x/week to address the identified rehab impairments via therapeutic activities, therapeutic exercises, neuromuscular re-education, wheelchair management/training and progress toward the following goals:    · Plan of Care Expires:  07/12/22    Subjective   "I am glad to be getting up"    Pain/Comfort:  · Pain Rating 1: 0/10  · Pain Rating Post-Intervention 1: 0/10    Patients cultural, spiritual, Alevism conflicts given the current situation: no    Living Environment:  Pt lives alone and has an aide 2xs/week for bathing and his nieces assist 4 hours/day per chart. Prior to admission, patients level of function was requires assist for ADLs and transfers to a w/c; mostly bed bound per pt.  Equipment used at home: hospital bed, slide board, wheelchair.  Upon discharge, patient will have assistance from aide and family.    Objective:     Communicated with nurse prior " to session.  Patient found HOB elevated with bed alarm, peripheral IV, telemetry  upon PT entry to room.    General Precautions: Standard, fall   Orthopedic Precautions:N/A   Braces: N/A  Respiratory Status: Room air    Exams:  · Cognitive Exam:  Patient is oriented to Person, Place and Time  · Sensation:    · -       Intact  light/touch B LE  · RLE ROM: WFL except knee ext limited to ~ -5 deg from neutral; ankle DF to neutral  · RLE Strength: Deficits: hip flex 3+/5; knee ext 3-/5; knee flex 4-/5; ankle DF 3-/5  · LLE ROM: WFL except knee ext limited to ~ -5 deg from neutral and ankle DF to neutral  · LLE Strength: Deficits: hip flex 3+/5 knee ext 3-/5; knee flex 4-/5; ankle DF 3-/5    Functional Mobility:  · Bed Mobility:     · Supine to Sit: minimum assistance  · Sit to Supine: minimum assistance  · Transfers:     · Scooting to the L in sitting x 3 trials:  moderate assistance with no AD    Therapeutic Activities and Exercises:   Pt sat on the EOB with no instability noted ~ 8 min Pt performed B LE exs in sitting x 10 reps:  knee ext  Co-treat with OT due to medical complexity of pt and need for skilled hands for safe intervention.Pt receiving blood during treatment    AM-PAC 6 CLICK MOBILITY  Total Score:13     Patient left HOB elevated with all lines intact, call button in reach, bed alarm on and nurse notified.    GOALS:   Multidisciplinary Problems     Physical Therapy Goals        Problem: Physical Therapy    Goal Priority Disciplines Outcome Goal Variances Interventions   Physical Therapy Goal     PT, PT/OT Ongoing, Progressing     Description: PT goals until 6/20/22    1. Pt supine to sit with SBA-not met  2. Pt sit to supine with SBA-not met  3. Pt sit to stand with RW with minimal assist-not met  4. Pt to transfer bed to/from w/c with or without sliding board with minimal assist.-not met  5. Pt to propel w/c 30ft on level surface with B UE with CGA.-not met  6. Pt to perform B LE exs in sitting or supine  x 10 reps to strengthen B LE to improve functional mobility.-not met                     History:     Past Medical History:   Diagnosis Date    Essential (primary) hypertension     Stage 3 chronic kidney disease        No past surgical history on file.    Time Tracking:     PT Received On: 06/12/22  PT Start Time: 0949     PT Stop Time: 1002  PT Total Time (min): 13 min     Billable Minutes: Evaluation 13      06/12/2022

## 2022-06-12 NOTE — PLAN OF CARE
Problem: Physical Therapy  Goal: Physical Therapy Goal  Description: PT goals until 6/20/22    1. Pt supine to sit with SBA-not met  2. Pt sit to supine with SBA-not met  3. Pt sit to stand with RW with minimal assist-not met  4. Pt to transfer bed to/from w/c with or without sliding board with minimal assist.-not met  5. Pt to propel w/c 30ft on level surface with B UE with CGA.-not met  6. Pt to perform B LE exs in sitting or supine x 10 reps to strengthen B LE to improve functional mobility.-not met    Outcome: Ongoing, Progressing   Pt's goals set and pt will benefit from skilled PT services to work towards improved functional mobility including: bed mobility, transfers, and w/c mobility.   6/12/2022

## 2022-06-12 NOTE — SUBJECTIVE & OBJECTIVE
Interval History: No acute events overnight, afebrile, hemodynamically stable. He had another episode of hematochezia this morning. Hemoglobin improved to 6.6 yesterday after 1u pRBC transfusion so additional unit was transfused.       Review of Systems   Constitutional:  Negative for chills and fever.   HENT:  Negative for congestion and rhinorrhea.    Eyes:  Negative for discharge and visual disturbance.   Respiratory:  Negative for cough and shortness of breath.    Cardiovascular:  Negative for chest pain and leg swelling.   Gastrointestinal:  Positive for blood in stool. Negative for abdominal pain, constipation, diarrhea, nausea, rectal pain and vomiting.   Genitourinary:  Negative for difficulty urinating and dysuria.   Musculoskeletal:  Negative for arthralgias, back pain and myalgias.   Skin:  Negative for pallor and rash.   Neurological:  Negative for dizziness and headaches.   Psychiatric/Behavioral:  Negative for sleep disturbance.    Objective:     Vital Signs (Most Recent):  Temp: 97.2 °F (36.2 °C) (06/12/22 1151)  Pulse: 76 (06/12/22 1151)  Resp: 18 (06/12/22 1151)  BP: (!) 165/78 (06/12/22 1151)  SpO2: 99 % (06/12/22 1151) Vital Signs (24h Range):  Temp:  [96.1 °F (35.6 °C)-98.7 °F (37.1 °C)] 97.2 °F (36.2 °C)  Pulse:  [] 76  Resp:  [14-20] 18  SpO2:  [99 %-100 %] 99 %  BP: (101-176)/(66-83) 165/78     Weight: 84.4 kg (186 lb)  Body mass index is 23.88 kg/m².    Intake/Output Summary (Last 24 hours) at 6/12/2022 1204  Last data filed at 6/12/2022 1151  Gross per 24 hour   Intake 3136.75 ml   Output --   Net 3136.75 ml      Physical Exam  Vitals and nursing note reviewed.   Constitutional:       General: He is not in acute distress.     Appearance: Normal appearance. He is normal weight. He is not ill-appearing, toxic-appearing or diaphoretic.   HENT:      Head: Normocephalic and atraumatic.   Eyes:      General: No scleral icterus.        Right eye: No discharge.         Left eye: No discharge.    Cardiovascular:      Rate and Rhythm: Normal rate and regular rhythm.      Pulses: Normal pulses.      Heart sounds: Normal heart sounds.   Pulmonary:      Effort: Pulmonary effort is normal. No respiratory distress.   Abdominal:      General: Abdomen is flat. Bowel sounds are normal. There is no distension.      Tenderness: There is no abdominal tenderness. There is no guarding.   Musculoskeletal:      Cervical back: No rigidity.      Right lower leg: No edema.      Left lower leg: No edema.   Skin:     General: Skin is warm and dry.      Capillary Refill: Capillary refill takes less than 2 seconds.      Coloration: Skin is not jaundiced.   Neurological:      General: No focal deficit present.      Mental Status: He is alert and oriented to person, place, and time. Mental status is at baseline.   Psychiatric:         Mood and Affect: Mood normal.         Behavior: Behavior normal.     Significant Labs: All pertinent labs within the past 24 hours have been reviewed.  CBC:   Recent Labs   Lab 06/11/22  1420 06/12/22  0000 06/12/22  0419   WBC 5.90 4.83 4.77   HGB 5.7* 6.6* 7.0*   HCT 21.9* 23.8* 25.5*    231 257     CMP:   Recent Labs   Lab 06/11/22  1420 06/12/22  0419    138   K 3.7 3.8    107   CO2 22* 22*   * 92   BUN 16 14   CREATININE 1.1 1.0   CALCIUM 9.1 8.9   PROT 8.1 7.6   ALBUMIN 2.9* 2.8*   BILITOT 0.4 0.8   ALKPHOS 71 67   AST 9* 9*   ALT <5* <5*   ANIONGAP 12 9   EGFRNONAA >60.0 >60.0     Magnesium:   Recent Labs   Lab 06/12/22  0419   MG 1.8     Phosphorous:  Recent Labs   Lab 06/12/22  0419   PHOS 2.9       Significant Imaging: I have reviewed all pertinent imaging results/findings within the past 24 hours.    Inpatient Medications:  Continuous Infusions:  Scheduled Meds:   atorvastatin  20 mg Oral Daily    ferrous sulfate  1 tablet Oral Daily    miconazole NITRATE 2 %   Topical (Top) BID    pantoprazole  40 mg Oral BID    polyethylene glycol  4,000 mL Oral Once    vitamin  D  1,000 Units Oral Daily     PRN Meds:sodium chloride, sodium chloride, melatonin, ondansetron, sodium chloride 0.9%

## 2022-06-12 NOTE — PLAN OF CARE
Problem: Occupational Therapy  Goal: Occupational Therapy Goal  Description: Goals to be met by: 6/26/22     Patient will increase functional independence with ADLs by performing:    Feeding with [place].  UE Dressing with Moderate Assistance.  LE Dressing with Moderate Assistance.  Grooming while EOB with Minimal Assistance.  Slide board t/f with min A  Outcome: Ongoing, Progressing     Evaluation complete-see note for details. Goals and POC established.

## 2022-06-12 NOTE — HOSPITAL COURSE
Mr. Londono was admitted to hospital medicine for further monitoring and management of a GI bleed. Gastroenterology was consulted- performed EGD/colonoscopy on 06/13 where a large colonic mass was noted and biopsied. Colorectal surgery was consulted for assistance- ordered staging imaging with plans of continuing workup/management in an outpatient setting.  CT chest w/o contrast showed no evidence of metastatic disease. CT lumbar spine showed no concerning lytic or blastic lesions to suggest metastatic disease. 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 Outpatient followup with Heme/Oncology and colorectal surgery appointment for further evaluation.

## 2022-06-12 NOTE — ASSESSMENT & PLAN NOTE
Mr. Londono is a 77 year-old male with HTN, CKD, anemia, and debility presenting from home after an episode of dark red stools. Hemoglobin on arrival was 5.7 now s/p 1U PRBC. Denies associated abdominal pain, nausea, vomiting, diarrhea. No hx of liver disease, no previous GI bleeds or colonoscopies. Denies family history of colon cancer or other malignancies. Admitted for management of GI bleed. GI consulted, suspect lower source of GI bleed.    Recent Labs     06/11/22  1420 06/12/22  0000 06/12/22  0419   HGB 5.7* 6.6* 7.0*   HCT 21.9* 23.8* 25.5*       PLAN:  -Gastroenterology consulted, planning for EGD/colonoscopy on 06/13/2022 for further evaluation.   --Okay for clear liquid diet today and then NPO for 06/13.    --Bowel prep ordered for this evening.    --Continue Protonix 40 mg IV BID.   --CBC q12hrs, transfuse if hgb <7

## 2022-06-12 NOTE — PLAN OF CARE
Patient alert and able to voice needs. Patient NPO at midnight. Encourage to drink golytely for endoscopy in the morning. Patient had 1 blood transfusion of prbc this morning. No adverse reaction. Tolerated well. Family at bedside. Patient BM water with large amounts of blood. And /85 MD notified with new orders for lab to draw cbc and call results in and will transfuse as needed. Patient Denies pain. No complaints at this time. Call light in reach.

## 2022-06-12 NOTE — HPI
Mr. Gera Londono is a 77 year old male for whom GI is consulted with concern for GI bleeding. He has a PMH significant for HTN and iron deficiency anemia.     Patient reports onset of one episode of maroon colored stool on 06/11/2022. He denies symptom association with abdominal pain, preceding melena, nausea, vomiting, dysphagia, or NSAID usage. He also denies prior history of GI bleeding or family history of gastric or colon cancer, and he has never undergone EGD or colonoscopy previously. Due to stool discoloration, he reported to ED here on 06/11.     Hospital Course: On arrival, vital signs normal on room air. Labs notable for acute on chronic anemia (Hgb 5.7 from 7.9 in 04/2021), normal platelets, normal BUN, and normal LFT's. He was admitted to hospital medicine, given PRBC, and GI consulted. No further bowel movements since admission.

## 2022-06-13 ENCOUNTER — ANESTHESIA EVENT (OUTPATIENT)
Dept: ENDOSCOPY | Facility: HOSPITAL | Age: 77
DRG: 375 | End: 2022-06-13
Payer: MEDICARE

## 2022-06-13 ENCOUNTER — ANESTHESIA (OUTPATIENT)
Dept: ENDOSCOPY | Facility: HOSPITAL | Age: 77
DRG: 375 | End: 2022-06-13
Payer: MEDICARE

## 2022-06-13 PROBLEM — M19.90 OSTEOARTHRITIS: Chronic | Status: ACTIVE | Noted: 2020-05-13

## 2022-06-13 PROBLEM — D62 ACUTE BLOOD LOSS ANEMIA: Status: ACTIVE | Noted: 2022-06-11

## 2022-06-13 PROBLEM — K62.89 RECTAL MASS: Status: ACTIVE | Noted: 2022-06-13

## 2022-06-13 LAB
ALBUMIN SERPL BCP-MCNC: 2.8 G/DL (ref 3.5–5.2)
ALP SERPL-CCNC: 75 U/L (ref 55–135)
ALT SERPL W/O P-5'-P-CCNC: 6 U/L (ref 10–44)
ANION GAP SERPL CALC-SCNC: 12 MMOL/L (ref 8–16)
AST SERPL-CCNC: 12 U/L (ref 10–40)
BASOPHILS # BLD AUTO: 0.03 K/UL (ref 0–0.2)
BASOPHILS # BLD AUTO: 0.05 K/UL (ref 0–0.2)
BASOPHILS NFR BLD: 0.5 % (ref 0–1.9)
BASOPHILS NFR BLD: 1 % (ref 0–1.9)
BILIRUB SERPL-MCNC: 4.1 MG/DL (ref 0.1–1)
BUN SERPL-MCNC: 11 MG/DL (ref 8–23)
CALCIUM SERPL-MCNC: 9 MG/DL (ref 8.7–10.5)
CEA SERPL-MCNC: 3.9 NG/ML (ref 0–5)
CHLORIDE SERPL-SCNC: 103 MMOL/L (ref 95–110)
CO2 SERPL-SCNC: 21 MMOL/L (ref 23–29)
CREAT SERPL-MCNC: 0.9 MG/DL (ref 0.5–1.4)
DIFFERENTIAL METHOD: ABNORMAL
DIFFERENTIAL METHOD: ABNORMAL
EOSINOPHIL # BLD AUTO: 0.2 K/UL (ref 0–0.5)
EOSINOPHIL # BLD AUTO: 0.2 K/UL (ref 0–0.5)
EOSINOPHIL NFR BLD: 3.2 % (ref 0–8)
EOSINOPHIL NFR BLD: 3.2 % (ref 0–8)
ERYTHROCYTE [DISTWIDTH] IN BLOOD BY AUTOMATED COUNT: 19.1 % (ref 11.5–14.5)
ERYTHROCYTE [DISTWIDTH] IN BLOOD BY AUTOMATED COUNT: 19.3 % (ref 11.5–14.5)
EST. GFR  (AFRICAN AMERICAN): >60 ML/MIN/1.73 M^2
EST. GFR  (NON AFRICAN AMERICAN): >60 ML/MIN/1.73 M^2
GLUCOSE SERPL-MCNC: 81 MG/DL (ref 70–110)
HCT VFR BLD AUTO: 28.1 % (ref 40–54)
HCT VFR BLD AUTO: 28.7 % (ref 40–54)
HCV AB SERPL QL IA: NEGATIVE
HGB BLD-MCNC: 8.2 G/DL (ref 14–18)
HGB BLD-MCNC: 8.2 G/DL (ref 14–18)
IMM GRANULOCYTES # BLD AUTO: 0.02 K/UL (ref 0–0.04)
IMM GRANULOCYTES # BLD AUTO: 0.03 K/UL (ref 0–0.04)
IMM GRANULOCYTES NFR BLD AUTO: 0.4 % (ref 0–0.5)
IMM GRANULOCYTES NFR BLD AUTO: 0.5 % (ref 0–0.5)
LYMPHOCYTES # BLD AUTO: 1 K/UL (ref 1–4.8)
LYMPHOCYTES # BLD AUTO: 1.1 K/UL (ref 1–4.8)
LYMPHOCYTES NFR BLD: 18.5 % (ref 18–48)
LYMPHOCYTES NFR BLD: 19.1 % (ref 18–48)
MAGNESIUM SERPL-MCNC: 2.1 MG/DL (ref 1.6–2.6)
MCH RBC QN AUTO: 21 PG (ref 27–31)
MCH RBC QN AUTO: 21 PG (ref 27–31)
MCHC RBC AUTO-ENTMCNC: 28.6 G/DL (ref 32–36)
MCHC RBC AUTO-ENTMCNC: 29.2 G/DL (ref 32–36)
MCV RBC AUTO: 72 FL (ref 82–98)
MCV RBC AUTO: 73 FL (ref 82–98)
MONOCYTES # BLD AUTO: 0.4 K/UL (ref 0.3–1)
MONOCYTES # BLD AUTO: 0.5 K/UL (ref 0.3–1)
MONOCYTES NFR BLD: 7.5 % (ref 4–15)
MONOCYTES NFR BLD: 9 % (ref 4–15)
NEUTROPHILS # BLD AUTO: 3.6 K/UL (ref 1.8–7.7)
NEUTROPHILS # BLD AUTO: 4 K/UL (ref 1.8–7.7)
NEUTROPHILS NFR BLD: 67.9 % (ref 38–73)
NEUTROPHILS NFR BLD: 69.2 % (ref 38–73)
NRBC BLD-RTO: 0 /100 WBC
NRBC BLD-RTO: 0 /100 WBC
PHOSPHATE SERPL-MCNC: 2.4 MG/DL (ref 2.7–4.5)
PLATELET # BLD AUTO: 222 K/UL (ref 150–450)
PLATELET # BLD AUTO: 245 K/UL (ref 150–450)
PMV BLD AUTO: 9.2 FL (ref 9.2–12.9)
PMV BLD AUTO: 9.3 FL (ref 9.2–12.9)
POTASSIUM SERPL-SCNC: 3.9 MMOL/L (ref 3.5–5.1)
PROT SERPL-MCNC: 7.4 G/DL (ref 6–8.4)
RBC # BLD AUTO: 3.91 M/UL (ref 4.6–6.2)
RBC # BLD AUTO: 3.91 M/UL (ref 4.6–6.2)
SODIUM SERPL-SCNC: 136 MMOL/L (ref 136–145)
WBC # BLD AUTO: 5.25 K/UL (ref 3.9–12.7)
WBC # BLD AUTO: 5.71 K/UL (ref 3.9–12.7)

## 2022-06-13 PROCEDURE — 45380 COLONOSCOPY AND BIOPSY: CPT | Mod: 52,,, | Performed by: INTERNAL MEDICINE

## 2022-06-13 PROCEDURE — 80053 COMPREHEN METABOLIC PANEL: CPT | Performed by: STUDENT IN AN ORGANIZED HEALTH CARE EDUCATION/TRAINING PROGRAM

## 2022-06-13 PROCEDURE — 88342 IMHCHEM/IMCYTCHM 1ST ANTB: CPT | Mod: 26,,, | Performed by: PATHOLOGY

## 2022-06-13 PROCEDURE — 88305 TISSUE EXAM BY PATHOLOGIST: ICD-10-PCS | Mod: 26,,, | Performed by: PATHOLOGY

## 2022-06-13 PROCEDURE — 82378 CARCINOEMBRYONIC ANTIGEN: CPT | Performed by: STUDENT IN AN ORGANIZED HEALTH CARE EDUCATION/TRAINING PROGRAM

## 2022-06-13 PROCEDURE — 99233 SBSQ HOSP IP/OBS HIGH 50: CPT | Mod: GC,,, | Performed by: HOSPITALIST

## 2022-06-13 PROCEDURE — 85025 COMPLETE CBC W/AUTO DIFF WBC: CPT | Performed by: STUDENT IN AN ORGANIZED HEALTH CARE EDUCATION/TRAINING PROGRAM

## 2022-06-13 PROCEDURE — 99223 PR INITIAL HOSPITAL CARE,LEVL III: ICD-10-PCS | Mod: 25,,, | Performed by: INTERNAL MEDICINE

## 2022-06-13 PROCEDURE — D9220A PRA ANESTHESIA: ICD-10-PCS | Mod: CRNA,,, | Performed by: NURSE ANESTHETIST, CERTIFIED REGISTERED

## 2022-06-13 PROCEDURE — 37000008 HC ANESTHESIA 1ST 15 MINUTES: Performed by: INTERNAL MEDICINE

## 2022-06-13 PROCEDURE — 36415 COLL VENOUS BLD VENIPUNCTURE: CPT | Performed by: STUDENT IN AN ORGANIZED HEALTH CARE EDUCATION/TRAINING PROGRAM

## 2022-06-13 PROCEDURE — 43239 EGD BIOPSY SINGLE/MULTIPLE: CPT | Mod: 51,,, | Performed by: INTERNAL MEDICINE

## 2022-06-13 PROCEDURE — 99233 PR SUBSEQUENT HOSPITAL CARE,LEVL III: ICD-10-PCS | Mod: GC,,, | Performed by: HOSPITALIST

## 2022-06-13 PROCEDURE — 84100 ASSAY OF PHOSPHORUS: CPT | Performed by: STUDENT IN AN ORGANIZED HEALTH CARE EDUCATION/TRAINING PROGRAM

## 2022-06-13 PROCEDURE — D9220A PRA ANESTHESIA: Mod: CRNA,,, | Performed by: NURSE ANESTHETIST, CERTIFIED REGISTERED

## 2022-06-13 PROCEDURE — 88342 CHG IMMUNOCYTOCHEMISTRY: ICD-10-PCS | Mod: 26,,, | Performed by: PATHOLOGY

## 2022-06-13 PROCEDURE — 37000009 HC ANESTHESIA EA ADD 15 MINS: Performed by: INTERNAL MEDICINE

## 2022-06-13 PROCEDURE — 25000003 PHARM REV CODE 250: Performed by: STUDENT IN AN ORGANIZED HEALTH CARE EDUCATION/TRAINING PROGRAM

## 2022-06-13 PROCEDURE — 63600175 PHARM REV CODE 636 W HCPCS: Performed by: NURSE ANESTHETIST, CERTIFIED REGISTERED

## 2022-06-13 PROCEDURE — 27201012 HC FORCEPS, HOT/COLD, DISP: Performed by: INTERNAL MEDICINE

## 2022-06-13 PROCEDURE — 94761 N-INVAS EAR/PLS OXIMETRY MLT: CPT

## 2022-06-13 PROCEDURE — 45380 PR COLONOSCOPY,BIOPSY: ICD-10-PCS | Mod: 52,,, | Performed by: INTERNAL MEDICINE

## 2022-06-13 PROCEDURE — 27200997: Performed by: INTERNAL MEDICINE

## 2022-06-13 PROCEDURE — D9220A PRA ANESTHESIA: Mod: ANES,,, | Performed by: ANESTHESIOLOGY

## 2022-06-13 PROCEDURE — 99223 1ST HOSP IP/OBS HIGH 75: CPT | Mod: 25,,, | Performed by: INTERNAL MEDICINE

## 2022-06-13 PROCEDURE — 83735 ASSAY OF MAGNESIUM: CPT | Performed by: STUDENT IN AN ORGANIZED HEALTH CARE EDUCATION/TRAINING PROGRAM

## 2022-06-13 PROCEDURE — 88305 TISSUE EXAM BY PATHOLOGIST: CPT | Mod: 59 | Performed by: PATHOLOGY

## 2022-06-13 PROCEDURE — 43239 PR EGD, FLEX, W/BIOPSY, SGL/MULTI: ICD-10-PCS | Mod: 51,,, | Performed by: INTERNAL MEDICINE

## 2022-06-13 PROCEDURE — 25000003 PHARM REV CODE 250: Performed by: NURSE ANESTHETIST, CERTIFIED REGISTERED

## 2022-06-13 PROCEDURE — 88342 IMHCHEM/IMCYTCHM 1ST ANTB: CPT | Performed by: PATHOLOGY

## 2022-06-13 PROCEDURE — 45380 COLONOSCOPY AND BIOPSY: CPT | Mod: SPCCPT | Performed by: INTERNAL MEDICINE

## 2022-06-13 PROCEDURE — 88305 TISSUE EXAM BY PATHOLOGIST: CPT | Mod: 26,,, | Performed by: PATHOLOGY

## 2022-06-13 PROCEDURE — D9220A PRA ANESTHESIA: ICD-10-PCS | Mod: ANES,,, | Performed by: ANESTHESIOLOGY

## 2022-06-13 PROCEDURE — 11000001 HC ACUTE MED/SURG PRIVATE ROOM

## 2022-06-13 RX ORDER — ONDANSETRON 2 MG/ML
4 INJECTION INTRAMUSCULAR; INTRAVENOUS ONCE AS NEEDED
Status: DISCONTINUED | OUTPATIENT
Start: 2022-06-13 | End: 2022-06-13 | Stop reason: HOSPADM

## 2022-06-13 RX ORDER — LIDOCAINE HCL/PF 100 MG/5ML
SYRINGE (ML) INTRAVENOUS
Status: DISCONTINUED | OUTPATIENT
Start: 2022-06-13 | End: 2022-06-13

## 2022-06-13 RX ORDER — FENTANYL CITRATE 50 UG/ML
25 INJECTION, SOLUTION INTRAMUSCULAR; INTRAVENOUS EVERY 5 MIN PRN
Status: DISCONTINUED | OUTPATIENT
Start: 2022-06-13 | End: 2022-06-13 | Stop reason: HOSPADM

## 2022-06-13 RX ORDER — HYDROMORPHONE HYDROCHLORIDE 1 MG/ML
0.2 INJECTION, SOLUTION INTRAMUSCULAR; INTRAVENOUS; SUBCUTANEOUS EVERY 5 MIN PRN
Status: DISCONTINUED | OUTPATIENT
Start: 2022-06-13 | End: 2022-06-13 | Stop reason: HOSPADM

## 2022-06-13 RX ORDER — PHENYLEPHRINE HYDROCHLORIDE 10 MG/ML
INJECTION INTRAVENOUS
Status: DISCONTINUED | OUTPATIENT
Start: 2022-06-13 | End: 2022-06-13

## 2022-06-13 RX ORDER — PROPOFOL 10 MG/ML
INJECTION, EMULSION INTRAVENOUS CONTINUOUS PRN
Status: DISCONTINUED | OUTPATIENT
Start: 2022-06-13 | End: 2022-06-13

## 2022-06-13 RX ORDER — DIPHENHYDRAMINE HYDROCHLORIDE 50 MG/ML
25 INJECTION INTRAMUSCULAR; INTRAVENOUS EVERY 6 HOURS PRN
Status: DISCONTINUED | OUTPATIENT
Start: 2022-06-13 | End: 2022-06-13 | Stop reason: HOSPADM

## 2022-06-13 RX ORDER — PROPOFOL 10 MG/ML
INJECTION, EMULSION INTRAVENOUS
Status: DISCONTINUED | OUTPATIENT
Start: 2022-06-13 | End: 2022-06-13

## 2022-06-13 RX ADMIN — ATORVASTATIN CALCIUM 20 MG: 20 TABLET, FILM COATED ORAL at 08:06

## 2022-06-13 RX ADMIN — MICONAZOLE NITRATE 2 % TOPICAL POWDER: at 09:06

## 2022-06-13 RX ADMIN — SODIUM CHLORIDE: 0.9 INJECTION, SOLUTION INTRAVENOUS at 10:06

## 2022-06-13 RX ADMIN — FERROUS SULFATE TAB 325 MG (65 MG ELEMENTAL FE) 1 EACH: 325 (65 FE) TAB at 08:06

## 2022-06-13 RX ADMIN — PANTOPRAZOLE SODIUM 40 MG: 40 TABLET, DELAYED RELEASE ORAL at 08:06

## 2022-06-13 RX ADMIN — Medication 100 MG: at 10:06

## 2022-06-13 RX ADMIN — PHENYLEPHRINE HYDROCHLORIDE 100 MCG: 10 INJECTION INTRAVENOUS at 11:06

## 2022-06-13 RX ADMIN — PROPOFOL 100 MCG/KG/MIN: 10 INJECTION, EMULSION INTRAVENOUS at 10:06

## 2022-06-13 RX ADMIN — PROPOFOL 100 MG: 10 INJECTION, EMULSION INTRAVENOUS at 10:06

## 2022-06-13 RX ADMIN — Medication 1000 UNITS: at 08:06

## 2022-06-13 NOTE — ASSESSMENT & PLAN NOTE
Mr. Londono is a 77 year-old male with HTN, CKD, anemia, and debility presenting from home after an episode of dark red stools. Hemoglobin on arrival was 5.7 now s/p 1U PRBC. Denies associated abdominal pain, nausea, vomiting, diarrhea. No hx of liver disease, no previous GI bleeds or colonoscopies. Denies family history of colon cancer or other malignancies. Admitted for management of GI bleed. GI consulted, suspect lower source of GI bleed.    Recent Labs     06/12/22  1734 06/12/22  2333 06/13/22  0445   HGB 8.4* 8.2* 8.2*   HCT 28.6* 28.1* 28.7*       PLAN:  -Gastroenterology consulted, planning for EGD/colonoscopy on 06/13/2022 for further evaluation.  -Maintain access, continue IV ppi for now  -will follow recommendations post-proc

## 2022-06-13 NOTE — CONSULTS
[facility] Surg  Colorectal Surgery  Consult Note    Patient Name: Gera Londono  MRN: [id number]  Admission Date: 6/11/2022  Hospital Length of Stay: 2 days  Attending Physician: Yue Villagomez*  Primary Care Provider: Gopal Hobbs MD    Inpatient consult to Colorectal Surgery  Consult performed by: Sayra Serrato MD  Consult ordered by: Shelbie Duran MD        Subjective:     Chief Complaint/Reason for Admission: rectal mass    History of Present Illness:   77M c HTN, CKD, and radiculopathy resulting in debility admitted 2 days ago with LGIB now found to have a rectal mass on colonoscopy.     Patient denies prior history of GI bleed or similar symptoms. Reports blood in stool immediately prior to presentation. Associated weakness and fatigue. Labs on arrival notable for hgb 5.7. Transfused with good response and stable hgb for the past day. No colonoscopy prior to admission. Weakness and fatigue noted to be resolved today. No blood per rectum since yesterday.     GI consulted with colonoscopy attempted today, notable for low rectal partially obstructing mass.         Denies associated symptoms including nausea, vomiting, constipation, diarrhea, anorexia, or change in weight. No family history of colorectal cancer or IBD.     Current Facility-Administered Medications   Medication    0.9%  NaCl infusion (for blood administration)    0.9%  NaCl infusion (for blood administration)    atorvastatin tablet 20 mg    ferrous sulfate tablet 1 each    melatonin tablet 6 mg    miconazole NITRATE 2 % top powder    ondansetron injection 4 mg    pantoprazole EC tablet 40 mg    sodium chloride 0.9% flush 10 mL    vitamin D 1000 units tablet 1,000 Units       Review of patient's allergies indicates:  No Known Allergies    Past Medical History:   Diagnosis Date    Essential (primary) hypertension     Stage 3 chronic kidney disease      Past Surgical History:   Procedure Laterality Date    NO PAST SURGERIES        Family History     Problem Relation (Age of Onset)    Arthritis Mother, Father, Brother    Diabetes Mother, Father, Sister    Heart disease Father    Hypertension Mother, Father, Sister        Tobacco Use    Smoking status: Former Smoker     Packs/day: 0.25     Years: 3.00     Pack years: 0.75     Types: Cigarettes     Start date: 12/8/2000    Smokeless tobacco: Never Used   Substance and Sexual Activity    Alcohol use: Not Currently    Drug use: No    Sexual activity: Not on file     Review of Systems   Constitutional: Negative for fever.   HENT: Negative for trouble swallowing.    Respiratory: Negative for chest tightness.    Cardiovascular: Negative for chest pain.   Gastrointestinal: Positive for blood in stool. Negative for abdominal distention, abdominal pain, anal bleeding, constipation, diarrhea, nausea, rectal pain and vomiting.   Genitourinary: Negative for difficulty urinating.   Musculoskeletal: Positive for gait problem.   Skin: Negative for wound.   Hematological: Does not bruise/bleed easily.   Psychiatric/Behavioral: Negative for confusion.     Objective:     Vital Signs (Most Recent):  Temp: 98.2 °F (36.8 °C) (06/13/22 1130)  Pulse: 107 (06/13/22 1517)  Resp: 16 (06/13/22 1230)  BP: (!) 170/92 (06/13/22 1245)  SpO2: 100 % (06/13/22 1245) Vital Signs (24h Range):  Temp:  [96.8 °F (36 °C)-98.9 °F (37.2 °C)] 98.2 °F (36.8 °C)  Pulse:  [] 107  Resp:  [13-20] 16  SpO2:  [98 %-100 %] 100 %  BP: ()/(63-92) 170/92     Weight: 84.4 kg (186 lb)  Body mass index is 23.88 kg/m².    Physical Exam  Constitutional:       Appearance: Normal appearance.   HENT:      Head: Normocephalic.      Nose: Nose normal.      Mouth/Throat:      Mouth: Mucous membranes are moist.   Eyes:      Extraocular Movements: Extraocular movements intact.   Cardiovascular:      Rate and Rhythm: Normal rate.   Pulmonary:      Effort: Pulmonary effort is normal.   Abdominal:      General: Abdomen is flat. There is no  distension.      Palpations: Abdomen is soft.   Musculoskeletal:         General: Normal range of motion.   Skin:     General: Skin is warm and dry.   Neurological:      Mental Status: He is alert. Mental status is at baseline.   Psychiatric:         Mood and Affect: Mood normal.         Behavior: Behavior normal.         Thought Content: Thought content normal.         Judgment: Judgment normal.           Significant Labs:  CBC (Last 3 Results):   Recent Labs   Lab 06/12/22  1734 06/12/22  2333 06/13/22  0445   WBC 7.59 5.71 5.25   RBC 3.95* 3.91* 3.91*   HGB 8.4* 8.2* 8.2*   HCT 28.6* 28.1* 28.7*    222 245   MCV 72* 72* 73*   MCH 21.3* 21.0* 21.0*   MCHC 29.4* 29.2* 28.6*       Significant Diagnostics:  None    Assessment/Plan:     Active Diagnoses:    Diagnosis Date Noted POA    PRINCIPAL PROBLEM:  Acute blood loss anemia [D62] 06/11/2022 Yes    Rectal mass [K62.89] 06/13/2022 Yes    Microcytic anemia [D50.9] 06/11/2022 Yes    Osteoarthritis [M19.90] 05/13/2020 Yes     Chronic    CKD (chronic kidney disease) stage 3, GFR 30-59 ml/min [N18.30] 02/18/2020 Yes     Chronic    Age-related physical debility [R54] 02/18/2020 Yes     Chronic    Hyperlipidemia [E78.5] 03/21/2019 Yes     Chronic    Vitamin D deficiency [E55.9] 03/21/2019 Yes     Chronic    Essential (primary) hypertension [I10] 03/19/2019 Yes     Chronic      Problems Resolved During this Admission:     77M admitted with lower GI bleed and found to have a partially obstructing rectal mass c/f malignancy. Based on history and exam, no evidence of obstructive symptoms present. Pathology pending, but recommend staging workup given concern for malignancy (ordered) which can be completed as an outpatient if no further inpatient care indicated per primary. Otherwise, may have diet as tolerated, trend labs, and transfuse as needed.     Thank you for your consult. Please call with questions.       Sayra Serrato MD  Colorectal Surgery  [facility] -  Med Surg

## 2022-06-13 NOTE — ANESTHESIA PREPROCEDURE EVALUATION
06/13/2022  Gera Londono is a 77 y.o., male.  Patient Active Problem List   Diagnosis    Essential (primary) hypertension    Hyperlipidemia    Vitamin D deficiency    CKD (chronic kidney disease) stage 3, GFR 30-59 ml/min    Age-related physical debility    Decreased mobility    Osteoarthritis    GI bleed    Microcytic anemia           Pre-op Assessment          Review of Systems      Physical Exam    Airway:  No airway management difficulties anticipated  Dental:No active dental issues noted  Chest/Lungs:  Clear to auscultation    Heart:  Rate: Normal  Rhythm: Regular Rhythm  Sounds: Normal        Anesthesia Plan  Type of Anesthesia, risks & benefits discussed:    Anesthesia Type: Gen Natural Airway, Gen ETT  Informed Consent: Informed consent signed with the Patient and all parties understand the risks and agree with anesthesia plan.  All questions answered.   ASA Score: 3  Anesthesia Plan Notes: Chart reviewed. Patient seen and examined. Anesthesia plan discussed and questions answered. E-consent signed. Kamar Porter MD    Ready For Surgery From Anesthesia Perspective.     .

## 2022-06-13 NOTE — PLAN OF CARE
Patient alert and able to voice needs. No sign of distress. Respirations even and unlabored. Abdomen soft and nontender. Colonoscopy today. Patient tolerated well. Mass found. Small amounts of blood in stool. Denies pain. Call light in reach.

## 2022-06-13 NOTE — ANESTHESIA POSTPROCEDURE EVALUATION
Anesthesia Post Evaluation    Patient: Gera Londono    Procedure(s) Performed: Procedure(s) (LRB):  EGD (ESOPHAGOGASTRODUODENOSCOPY) (N/A)  COLONOSCOPY (N/A)    Final Anesthesia Type: general      Level of consciousness: awake and alert  Post-procedure vital signs: reviewed and stable  Pain control: Pain has been treated.  Airway patency: patent    PONV status: Absent or treated.  Anesthetic complications: no      Cardiovascular status: hemodynamically stable  Respiratory status: unassisted  Hydration status: euvolemic            Vitals Value Taken Time   /71 06/13/22 1146   Temp 36.8 °C (98.2 °F) 06/13/22 1130   Pulse 80 06/13/22 1150   Resp 18 06/13/22 1150   SpO2 100 % 06/13/22 1150   Vitals shown include unvalidated device data.      No case tracking events are documented in the log.      Pain/Dot Score: Dot Score: 9 (6/13/2022 11:30 AM)

## 2022-06-13 NOTE — TREATMENT PLAN
Post-Procedure Gastroenterology Treatment Plan:     Gera Londono is status post EGD/colonoscopy  with the following findings:     -Small mucosal nodule seen at GE junction; biopsied.   -Normal stomach and duodenum.   -No bleeding seen in upper GI tract.     -Large mass seen in rectum with friable mucosa; this was biopsied. Scope was unable to be advanced passed the rectum.     Our recommendations are as follows:     -Okay to resume diet.   -Can discontinue PPI BID.   -Follow-up pathology results.   -Needs evaluation by colorectal surgery team, either in-patient or outpatient.   -Continue transfusions for Hgb <7.     No further endoscopic interventions planned. We will sign off. Please call with questions.         Raciel Diaz MD, PGY-IV  Gastroenterology Fellow  [facility]

## 2022-06-13 NOTE — TRANSFER OF CARE
"Anesthesia Transfer of Care Note    Patient: Gera Londono    Procedure(s) Performed: Procedure(s) (LRB):  EGD (ESOPHAGOGASTRODUODENOSCOPY) (N/A)  COLONOSCOPY (N/A)    Patient location: [facility]    Anesthesia Type: MAC    Transport from OR: Transported from OR on room air with adequate spontaneous ventilation    Post pain: adequate analgesia    Post assessment: no apparent anesthetic complications and tolerated procedure well    Post vital signs: stable    Level of consciousness: awake    Nausea/Vomiting: no nausea/vomiting    Complications: none    Transfer of care protocol was followed      Last vitals:   Visit Vitals  BP (!) 97/64 (BP Location: Left arm, Patient Position: Lying)   Pulse 87   Temp 36.7 °C (98.1 °F) (Temporal)   Resp 16   Ht 6' 2" (1.88 m)   Wt 84.4 kg (186 lb)   SpO2 100%   BMI 23.88 kg/m²     "

## 2022-06-13 NOTE — PROGRESS NOTES
[facility] ([facility])  [facility] Medicine  Progress Note    Patient Name: Gera Londono  MRN: [id number]  Patient Class: IP- Inpatient   Admission Date: 6/11/2022  Length of Stay: 2 days  Attending Physician: Yue Villagomez*  Primary Care Provider: Gopal Hobbs MD        Subjective:     Principal Problem:GI bleed        HPI:  Mr. Londono is a 77 year-old male with HTN, CKD, anemia, and debility who was brought to the ED on 06/11 following an episode of dark/red-appearing stool. He has been in his general state of health until today when he noticed the abnormal bowel movement. Denies associated abdominal pain, constipation, diarrhea, chest pain, shortness of breath. He has not had previous similar episodes. He additionally has never had a colonoscopy or endoscopy before.     ED course: On arrival he was afebrile, normotensive, non-tachycardic, with oxygen saturations at goal on room air. Labs revealed a hemoglobin of 5.7. He was transfused 1U PRBC and admitted to hospital medicine for further monitoring and management of a GI bleed.       Overview/Hospital Course:  Mr. Londono was admitted to hospital medicine for further monitoring and management of a GI bleed. Gastroenterology was consulted- performing EGD/colonoscopy on 06/13.      Interval History: No events overnight. Had ongoing bleeds throughout the afternoon yesterday but hemoglobin after that and repeat this AM were wnl .  -undergoing egd/colo today  -will resume anti-hypertensives post-procedure if tolerated    Review of Systems   Constitutional:  Negative for chills and fever.   HENT:  Negative for congestion and rhinorrhea.    Eyes:  Negative for discharge and visual disturbance.   Respiratory:  Negative for cough and shortness of breath.    Cardiovascular:  Negative for chest pain and leg swelling.   Gastrointestinal:  Positive for blood in stool. Negative for abdominal pain, constipation, diarrhea, nausea, rectal pain and vomiting.    Genitourinary:  Negative for difficulty urinating and dysuria.   Musculoskeletal:  Negative for arthralgias, back pain and myalgias.   Skin:  Negative for pallor and rash.   Neurological:  Negative for dizziness and headaches.   Psychiatric/Behavioral:  Negative for sleep disturbance.    Objective:     Vital Signs (Most Recent):  Temp: 98.2 °F (36.8 °C) (06/13/22 1130)  Pulse: 88 (06/13/22 1130)  Resp: 16 (06/13/22 1130)  BP: 97/63 (06/13/22 1128)  SpO2: 100 % (06/13/22 1130) Vital Signs (24h Range):  Temp:  [96.8 °F (36 °C)-98.9 °F (37.2 °C)] 98.2 °F (36.8 °C)  Pulse:  [71-88] 88  Resp:  [16-20] 16  SpO2:  [98 %-100 %] 100 %  BP: ()/(63-86) 97/63     Weight: 84.4 kg (186 lb)  Body mass index is 23.88 kg/m².    Intake/Output Summary (Last 24 hours) at 6/13/2022 1147  Last data filed at 6/13/2022 1112  Gross per 24 hour   Intake 3626.75 ml   Output 600 ml   Net 3026.75 ml        Physical Exam  Vitals and nursing note reviewed.   Constitutional:       General: He is not in acute distress.     Appearance: Normal appearance. He is normal weight. He is not ill-appearing, toxic-appearing or diaphoretic.   HENT:      Head: Normocephalic and atraumatic.   Eyes:      General: No scleral icterus.        Right eye: No discharge.         Left eye: No discharge.   Cardiovascular:      Rate and Rhythm: Normal rate and regular rhythm.      Pulses: Normal pulses.      Heart sounds: Normal heart sounds.   Pulmonary:      Effort: Pulmonary effort is normal. No respiratory distress.   Abdominal:      General: Abdomen is flat. Bowel sounds are normal. There is no distension.      Tenderness: There is no abdominal tenderness. There is no guarding.   Musculoskeletal:      Cervical back: No rigidity.      Right lower leg: No edema.      Left lower leg: No edema.   Skin:     General: Skin is warm and dry.      Capillary Refill: Capillary refill takes less than 2 seconds.      Coloration: Skin is not jaundiced.   Neurological:       General: No focal deficit present.      Mental Status: He is alert and oriented to person, place, and time. Mental status is at baseline.   Psychiatric:         Mood and Affect: Mood normal.         Behavior: Behavior normal.     Significant Labs: All pertinent labs within the past 24 hours have been reviewed.  CBC:   Recent Labs   Lab 06/12/22  1734 06/12/22  2333 06/13/22  0445   WBC 7.59 5.71 5.25   HGB 8.4* 8.2* 8.2*   HCT 28.6* 28.1* 28.7*    222 245       CMP:   Recent Labs   Lab 06/11/22  1420 06/12/22  0419 06/13/22  0445    138 136   K 3.7 3.8 3.9    107 103   CO2 22* 22* 21*   * 92 81   BUN 16 14 11   CREATININE 1.1 1.0 0.9   CALCIUM 9.1 8.9 9.0   PROT 8.1 7.6 7.4   ALBUMIN 2.9* 2.8* 2.8*   BILITOT 0.4 0.8 4.1*   ALKPHOS 71 67 75   AST 9* 9* 12   ALT <5* <5* 6*   ANIONGAP 12 9 12   EGFRNONAA >60.0 >60.0 >60.0       Magnesium:   Recent Labs   Lab 06/12/22  0419 06/13/22  0445   MG 1.8 2.1       Phosphorous:  Recent Labs   Lab 06/12/22  0419 06/13/22  0445   PHOS 2.9 2.4*         Significant Imaging: I have reviewed all pertinent imaging results/findings within the past 24 hours.    Inpatient Medications:  Continuous Infusions:  Scheduled Meds:   atorvastatin  20 mg Oral Daily    ferrous sulfate  1 tablet Oral Daily    miconazole NITRATE 2 %   Topical (Top) BID    pantoprazole  40 mg Oral BID    vitamin D  1,000 Units Oral Daily     PRN Meds:sodium chloride, sodium chloride, diphenhydrAMINE, fentaNYL, HYDROmorphone, melatonin, ondansetron, ondansetron, sodium chloride 0.9%        Assessment/Plan:      * GI bleed  Mr. Londono is a 77 year-old male with HTN, CKD, anemia, and debility presenting from home after an episode of dark red stools. Hemoglobin on arrival was 5.7 now s/p 1U PRBC. Denies associated abdominal pain, nausea, vomiting, diarrhea. No hx of liver disease, no previous GI bleeds or colonoscopies. Denies family history of colon cancer or other malignancies. Admitted  for management of GI bleed. GI consulted, suspect lower source of GI bleed.    Recent Labs     06/12/22  1734 06/12/22  2333 06/13/22  0445   HGB 8.4* 8.2* 8.2*   HCT 28.6* 28.1* 28.7*       PLAN:  -Gastroenterology consulted, planning for EGD/colonoscopy on 06/13/2022 for further evaluation.  -Maintain access, continue IV ppi for now  -will follow recommendations post-proc    Age-related physical debility  Per Social Work assessment Patient currently has a HH RN through [facility] . He is unable to ambulate independently and is bedridden. Able to self feed but requires assistance for ADLs.No reports of elder abuse or neglect.      PLAN  --Social work consulted, following for possibility of increased HH and caregiving support if available as patient does not prefer nursing home placement. If unable, plan to discuss NH placement with family/POA.  --PT/OT consulted    CKD (chronic kidney disease) stage 3, GFR 30-59 ml/min  --creatinine/GFR at baseline  --avoid nephrotoxins, renally-dose medications    Vitamin D deficiency  - vitamin D supplement qd    Hyperlipidemia  --resume home statin      Essential (primary) hypertension  --hold anti-hypertensives in the setting of a GIB  --resume when clinically indicated        VTE Risk Mitigation (From admission, onward)         Ordered     Place sequential compression device  Until discontinued         06/11/22 1631     Reason for No Pharmacological VTE Prophylaxis  Once        Question:  Reasons:  Answer:  Active Bleeding  Comment:  GI bleed    06/11/22 1631     IP VTE HIGH RISK PATIENT  Once         06/11/22 1631              Discharge Planning   PARI: 6/14/2022     Code Status: Full Code   Is the patient medically ready for discharge?: No    Reason for patient still in hospital (select all that apply): Patient trending condition  Discharge Plan A: Home with family, Home Health, Community Services        Shelbie Duran MD  Department of Hospital Medicine   [facility] - Surgery  (2nd Fl)

## 2022-06-13 NOTE — SUBJECTIVE & OBJECTIVE
Interval History: No events overnight. Had ongoing bleeds throughout the afternoon yesterday but hemoglobin after that and repeat this AM were wnl .  -undergoing egd/colo today  -will resume anti-hypertensives post-procedure if tolerated    Review of Systems   Constitutional:  Negative for chills and fever.   HENT:  Negative for congestion and rhinorrhea.    Eyes:  Negative for discharge and visual disturbance.   Respiratory:  Negative for cough and shortness of breath.    Cardiovascular:  Negative for chest pain and leg swelling.   Gastrointestinal:  Positive for blood in stool. Negative for abdominal pain, constipation, diarrhea, nausea, rectal pain and vomiting.   Genitourinary:  Negative for difficulty urinating and dysuria.   Musculoskeletal:  Negative for arthralgias, back pain and myalgias.   Skin:  Negative for pallor and rash.   Neurological:  Negative for dizziness and headaches.   Psychiatric/Behavioral:  Negative for sleep disturbance.    Objective:     Vital Signs (Most Recent):  Temp: 98.2 °F (36.8 °C) (06/13/22 1130)  Pulse: 88 (06/13/22 1130)  Resp: 16 (06/13/22 1130)  BP: 97/63 (06/13/22 1128)  SpO2: 100 % (06/13/22 1130) Vital Signs (24h Range):  Temp:  [96.8 °F (36 °C)-98.9 °F (37.2 °C)] 98.2 °F (36.8 °C)  Pulse:  [71-88] 88  Resp:  [16-20] 16  SpO2:  [98 %-100 %] 100 %  BP: ()/(63-86) 97/63     Weight: 84.4 kg (186 lb)  Body mass index is 23.88 kg/m².    Intake/Output Summary (Last 24 hours) at 6/13/2022 1147  Last data filed at 6/13/2022 1112  Gross per 24 hour   Intake 3626.75 ml   Output 600 ml   Net 3026.75 ml        Physical Exam  Vitals and nursing note reviewed.   Constitutional:       General: He is not in acute distress.     Appearance: Normal appearance. He is normal weight. He is not ill-appearing, toxic-appearing or diaphoretic.   HENT:      Head: Normocephalic and atraumatic.   Eyes:      General: No scleral icterus.        Right eye: No discharge.         Left eye: No  discharge.   Cardiovascular:      Rate and Rhythm: Normal rate and regular rhythm.      Pulses: Normal pulses.      Heart sounds: Normal heart sounds.   Pulmonary:      Effort: Pulmonary effort is normal. No respiratory distress.   Abdominal:      General: Abdomen is flat. Bowel sounds are normal. There is no distension.      Tenderness: There is no abdominal tenderness. There is no guarding.   Musculoskeletal:      Cervical back: No rigidity.      Right lower leg: No edema.      Left lower leg: No edema.   Skin:     General: Skin is warm and dry.      Capillary Refill: Capillary refill takes less than 2 seconds.      Coloration: Skin is not jaundiced.   Neurological:      General: No focal deficit present.      Mental Status: He is alert and oriented to person, place, and time. Mental status is at baseline.   Psychiatric:         Mood and Affect: Mood normal.         Behavior: Behavior normal.     Significant Labs: All pertinent labs within the past 24 hours have been reviewed.  CBC:   Recent Labs   Lab 06/12/22  1734 06/12/22  2333 06/13/22  0445   WBC 7.59 5.71 5.25   HGB 8.4* 8.2* 8.2*   HCT 28.6* 28.1* 28.7*    222 245       CMP:   Recent Labs   Lab 06/11/22  1420 06/12/22  0419 06/13/22  0445    138 136   K 3.7 3.8 3.9    107 103   CO2 22* 22* 21*   * 92 81   BUN 16 14 11   CREATININE 1.1 1.0 0.9   CALCIUM 9.1 8.9 9.0   PROT 8.1 7.6 7.4   ALBUMIN 2.9* 2.8* 2.8*   BILITOT 0.4 0.8 4.1*   ALKPHOS 71 67 75   AST 9* 9* 12   ALT <5* <5* 6*   ANIONGAP 12 9 12   EGFRNONAA >60.0 >60.0 >60.0       Magnesium:   Recent Labs   Lab 06/12/22  0419 06/13/22  0445   MG 1.8 2.1       Phosphorous:  Recent Labs   Lab 06/12/22  0419 06/13/22  0445   PHOS 2.9 2.4*         Significant Imaging: I have reviewed all pertinent imaging results/findings within the past 24 hours.    Inpatient Medications:  Continuous Infusions:  Scheduled Meds:   atorvastatin  20 mg Oral Daily    ferrous sulfate  1 tablet Oral  Daily    miconazole NITRATE 2 %   Topical (Top) BID    pantoprazole  40 mg Oral BID    vitamin D  1,000 Units Oral Daily     PRN Meds:sodium chloride, sodium chloride, diphenhydrAMINE, fentaNYL, HYDROmorphone, melatonin, ondansetron, ondansetron, sodium chloride 0.9%

## 2022-06-13 NOTE — [employer]ATION PATIENT INSTRUCTIONS
Discharge Summary/Instructions after an Endoscopic Procedure  Patient Name: Gera Londono  Patient MRN: [id number]  Patient YOB: 1945 Monday, June 13, 2022  Saul Pete MD  Dear patient,  As a result of recent federal legislation (The Federal Cures Act), you may   receive lab or pathology results from your procedure in your [facility]   account before your physician is able to contact you. Your physician or   their representative will relay the results to you with their   recommendations at their soonest availability.  Thank you,  RESTRICTIONS:  During your procedure today, you received medications for sedation.  These   medications may affect your judgment, balance and coordination.  Therefore,   for 24 hours, you have the following restrictions:   - DO NOT drive a car, operate machinery, make legal/financial decisions,   sign important papers or drink alcohol.    ACTIVITY:  Today: no heavy lifting, straining or running due to procedural   sedation/anesthesia.  The following day: return to full activity including work.  DIET:  Eat and drink normally unless instructed otherwise.     TREATMENT FOR COMMON SIDE EFFECTS:  - Mild abdominal pain, nausea, belching, bloating or excessive gas:  rest,   eat lightly and use a heating pad.  - Sore Throat: treat with throat lozenges and/or gargle with warm salt   water.  - Because air was used during the procedure, expelling large amounts of air   from your rectum or belching is normal.  - If a bowel prep was taken, you may not have a bowel movement for 1-3 days.    This is normal.  SYMPTOMS TO WATCH FOR AND REPORT TO YOUR PHYSICIAN:  1. Abdominal pain or bloating, other than gas cramps.  2. Chest pain.  3. Back pain.  4. Signs of infection such as: chills or fever occurring within 24 hours   after the procedure.  5. Rectal bleeding, which would show as bright red, maroon, or black stools.   (A tablespoon of blood from the rectum is not serious, especially if    hemorrhoids are present.)  6. Vomiting.  7. Weakness or dizziness.  GO DIRECTLY TO THE NEAREST EMERGENCY ROOM IF YOU HAVE ANY OF THE FOLLOWING:      Difficulty breathing              Chills and/or fever over 101 F   Persistent vomiting and/or vomiting blood   Severe abdominal pain   Severe chest pain   Black, tarry stools   Bleeding- more than one tablespoon   Any other symptom or condition that you feel may need urgent attention  Your doctor recommends these additional instructions:  If any biopsies were taken, your doctors clinic will contact you in 1 to 2   weeks with any results.  - Return patient to [facility] for ongoing care.   - Resume previous diet.   - Continue present medications.   - Await pathology results.   For questions, problems or results please call your physician - Saul Pete MD at Work:  [phone].  [facility], EMERGENCY ROOM PHONE NUMBER: [phone]  IF A COMPLICATION OR EMERGENCY SITUATION ARISES AND YOU ARE UNABLE TO REACH   YOUR PHYSICIAN - GO DIRECTLY TO THE EMERGENCY ROOM.  Saul Pete MD  6/13/2022 10:59:19 AM  This report has been verified and signed electronically.  Dear patient,  As a result of recent federal legislation (The Federal Cures Act), you may   receive lab or pathology results from your procedure in your [facility]   account before your physician is able to contact you. Your physician or   their representative will relay the results to you with their   recommendations at their soonest availability.  Thank you,  PROVATION

## 2022-06-13 NOTE — PROVATION PATIENT INSTRUCTIONS
Discharge Summary/Instructions after an Endoscopic Procedure  Patient Name: Gera Londono  Patient MRN: [id number]  Patient YOB: 1945 Monday, June 13, 2022  Saul Pete MD  Dear patient,  As a result of recent federal legislation (The Federal Cures Act), you may   receive lab or pathology results from your procedure in your [facility]   account before your physician is able to contact you. Your physician or   their representative will relay the results to you with their   recommendations at their soonest availability.  Thank you,  RESTRICTIONS:  During your procedure today, you received medications for sedation.  These   medications may affect your judgment, balance and coordination.  Therefore,   for 24 hours, you have the following restrictions:   - DO NOT drive a car, operate machinery, make legal/financial decisions,   sign important papers or drink alcohol.    ACTIVITY:  Today: no heavy lifting, straining or running due to procedural   sedation/anesthesia.  The following day: return to full activity including work.  DIET:  Eat and drink normally unless instructed otherwise.     TREATMENT FOR COMMON SIDE EFFECTS:  - Mild abdominal pain, nausea, belching, bloating or excessive gas:  rest,   eat lightly and use a heating pad.  - Sore Throat: treat with throat lozenges and/or gargle with warm salt   water.  - Because air was used during the procedure, expelling large amounts of air   from your rectum or belching is normal.  - If a bowel prep was taken, you may not have a bowel movement for 1-3 days.    This is normal.  SYMPTOMS TO WATCH FOR AND REPORT TO YOUR PHYSICIAN:  1. Abdominal pain or bloating, other than gas cramps.  2. Chest pain.  3. Back pain.  4. Signs of infection such as: chills or fever occurring within 24 hours   after the procedure.  5. Rectal bleeding, which would show as bright red, maroon, or black stools.   (A tablespoon of blood from the rectum is not serious, especially if    hemorrhoids are present.)  6. Vomiting.  7. Weakness or dizziness.  GO DIRECTLY TO THE NEAREST EMERGENCY ROOM IF YOU HAVE ANY OF THE FOLLOWING:      Difficulty breathing              Chills and/or fever over 101 F   Persistent vomiting and/or vomiting blood   Severe abdominal pain   Severe chest pain   Black, tarry stools   Bleeding- more than one tablespoon   Any other symptom or condition that you feel may need urgent attention  Your doctor recommends these additional instructions:  If any biopsies were taken, your doctors clinic will contact you in 1 to 2   weeks with any results.  - Return patient to hospital [facility] for ongoing care.   - Advance diet as tolerated.   - Await pathology results.   - Refer to a colo-rectal surgeon.   - Continue present medications.   For questions, problems or results please call your physician - Saul Pete MD at Work:  [phone].  [facility], EMERGENCY ROOM PHONE NUMBER: [phone]  IF A COMPLICATION OR EMERGENCY SITUATION ARISES AND YOU ARE UNABLE TO REACH   YOUR PHYSICIAN - GO DIRECTLY TO THE EMERGENCY ROOM.  Saul Pete MD  6/13/2022 11:21:24 AM  This report has been verified and signed electronically.  Dear patient,  As a result of recent federal legislation (The Federal Cures Act), you may   receive lab or pathology results from your procedure in your [facility]   account before your physician is able to contact you. Your physician or   their representative will relay the results to you with their   recommendations at their soonest availability.  Thank you,  PROVATION

## 2022-06-13 NOTE — NURSING TRANSFER
Nursing Transfer Note      6/13/2022    Transfer To: 629      Transfer via stretcher    Transfer with cardiac monitoring    Transported by Pt. Transport     Medicines sent: none    Any special needs or follow-up needed: routine    Chart send with patient: Yes    Notified: relative and sister     Patient reassessed at: [id number]

## 2022-06-14 LAB
ALBUMIN SERPL BCP-MCNC: 2.8 G/DL (ref 3.5–5.2)
ALP SERPL-CCNC: 66 U/L (ref 55–135)
ALT SERPL W/O P-5'-P-CCNC: 5 U/L (ref 10–44)
ANION GAP SERPL CALC-SCNC: 11 MMOL/L (ref 8–16)
AST SERPL-CCNC: 13 U/L (ref 10–40)
BASOPHILS # BLD AUTO: 0.05 K/UL (ref 0–0.2)
BASOPHILS NFR BLD: 0.7 % (ref 0–1.9)
BILIRUB SERPL-MCNC: 1 MG/DL (ref 0.1–1)
BUN SERPL-MCNC: 13 MG/DL (ref 8–23)
CALCIUM SERPL-MCNC: 8.6 MG/DL (ref 8.7–10.5)
CHLORIDE SERPL-SCNC: 108 MMOL/L (ref 95–110)
CO2 SERPL-SCNC: 18 MMOL/L (ref 23–29)
CREAT SERPL-MCNC: 1.2 MG/DL (ref 0.5–1.4)
DIFFERENTIAL METHOD: ABNORMAL
EOSINOPHIL # BLD AUTO: 0.1 K/UL (ref 0–0.5)
EOSINOPHIL NFR BLD: 1.7 % (ref 0–8)
ERYTHROCYTE [DISTWIDTH] IN BLOOD BY AUTOMATED COUNT: 20 % (ref 11.5–14.5)
EST. GFR  (AFRICAN AMERICAN): >60 ML/MIN/1.73 M^2
EST. GFR  (NON AFRICAN AMERICAN): 58.4 ML/MIN/1.73 M^2
GLUCOSE SERPL-MCNC: 97 MG/DL (ref 70–110)
HCT VFR BLD AUTO: 30.9 % (ref 40–54)
HGB BLD-MCNC: 8.5 G/DL (ref 14–18)
IMM GRANULOCYTES # BLD AUTO: 0.05 K/UL (ref 0–0.04)
IMM GRANULOCYTES NFR BLD AUTO: 0.7 % (ref 0–0.5)
LYMPHOCYTES # BLD AUTO: 1.3 K/UL (ref 1–4.8)
LYMPHOCYTES NFR BLD: 18.5 % (ref 18–48)
MAGNESIUM SERPL-MCNC: 2 MG/DL (ref 1.6–2.6)
MCH RBC QN AUTO: 21.2 PG (ref 27–31)
MCHC RBC AUTO-ENTMCNC: 27.5 G/DL (ref 32–36)
MCV RBC AUTO: 77 FL (ref 82–98)
MONOCYTES # BLD AUTO: 0.6 K/UL (ref 0.3–1)
MONOCYTES NFR BLD: 8.8 % (ref 4–15)
NEUTROPHILS # BLD AUTO: 5 K/UL (ref 1.8–7.7)
NEUTROPHILS NFR BLD: 69.6 % (ref 38–73)
NRBC BLD-RTO: 0 /100 WBC
PHOSPHATE SERPL-MCNC: 2.4 MG/DL (ref 2.7–4.5)
PLATELET # BLD AUTO: 182 K/UL (ref 150–450)
PMV BLD AUTO: 9.5 FL (ref 9.2–12.9)
POTASSIUM SERPL-SCNC: 4.8 MMOL/L (ref 3.5–5.1)
PROT SERPL-MCNC: 6.9 G/DL (ref 6–8.4)
RBC # BLD AUTO: 4.01 M/UL (ref 4.6–6.2)
SODIUM SERPL-SCNC: 137 MMOL/L (ref 136–145)
WBC # BLD AUTO: 7.13 K/UL (ref 3.9–12.7)

## 2022-06-14 PROCEDURE — 97535 SELF CARE MNGMENT TRAINING: CPT

## 2022-06-14 PROCEDURE — 99233 PR SUBSEQUENT HOSPITAL CARE,LEVL III: ICD-10-PCS | Mod: GC,,, | Performed by: HOSPITALIST

## 2022-06-14 PROCEDURE — 99233 SBSQ HOSP IP/OBS HIGH 50: CPT | Mod: GC,,, | Performed by: HOSPITALIST

## 2022-06-14 PROCEDURE — 97110 THERAPEUTIC EXERCISES: CPT

## 2022-06-14 PROCEDURE — 11000001 HC ACUTE MED/SURG PRIVATE ROOM

## 2022-06-14 PROCEDURE — 80053 COMPREHEN METABOLIC PANEL: CPT | Performed by: STUDENT IN AN ORGANIZED HEALTH CARE EDUCATION/TRAINING PROGRAM

## 2022-06-14 PROCEDURE — 25000003 PHARM REV CODE 250: Performed by: STUDENT IN AN ORGANIZED HEALTH CARE EDUCATION/TRAINING PROGRAM

## 2022-06-14 PROCEDURE — 85025 COMPLETE CBC W/AUTO DIFF WBC: CPT | Performed by: STUDENT IN AN ORGANIZED HEALTH CARE EDUCATION/TRAINING PROGRAM

## 2022-06-14 PROCEDURE — 36415 COLL VENOUS BLD VENIPUNCTURE: CPT | Performed by: STUDENT IN AN ORGANIZED HEALTH CARE EDUCATION/TRAINING PROGRAM

## 2022-06-14 PROCEDURE — 83735 ASSAY OF MAGNESIUM: CPT | Performed by: STUDENT IN AN ORGANIZED HEALTH CARE EDUCATION/TRAINING PROGRAM

## 2022-06-14 PROCEDURE — 84100 ASSAY OF PHOSPHORUS: CPT | Performed by: STUDENT IN AN ORGANIZED HEALTH CARE EDUCATION/TRAINING PROGRAM

## 2022-06-14 RX ORDER — LOSARTAN POTASSIUM 25 MG/1
25 TABLET ORAL DAILY
Qty: 90 TABLET | Refills: 3 | Status: SHIPPED | OUTPATIENT
Start: 2022-06-14

## 2022-06-14 RX ORDER — FERROUS SULFATE 325(65) MG
TABLET ORAL
Qty: 180 TABLET | Refills: 3 | Status: SHIPPED | OUTPATIENT
Start: 2022-06-14

## 2022-06-14 RX ADMIN — MICONAZOLE NITRATE 2 % TOPICAL POWDER: at 09:06

## 2022-06-14 RX ADMIN — FERROUS SULFATE TAB 325 MG (65 MG ELEMENTAL FE) 1 EACH: 325 (65 FE) TAB at 09:06

## 2022-06-14 RX ADMIN — Medication 1000 UNITS: at 09:06

## 2022-06-14 RX ADMIN — ATORVASTATIN CALCIUM 20 MG: 20 TABLET, FILM COATED ORAL at 09:06

## 2022-06-14 NOTE — PLAN OF CARE
[facility]  Discharge Final Note    Primary Care Provider: Gopal Hobbs MD    Expected Discharge Date: 6/14/2022     Future Appointments   Date Time Provider Department Center   6/15/2022  8:00 AM Ciro Saldivar NP [address]   7/5/2022 10:00 AM [facility]       Final Discharge Note (most recent)     Final Note - 06/14/22 1334        Final Note    Assessment Type Final Discharge Note     Anticipated Discharge Disposition Home-Health Care Svc     What phone number can be called within the next 1-3 days to see how you are doing after discharge? [phone]     Hospital Resources/Appts/Education Provided Post-Acute resouces added to AVS;Appointments scheduled and added to AVS        Post-Acute Status    Post-Acute Authorization Home Health     Home Health Status Referrals Sent     Discharge Delays None known at this time                 Important Message from Medicare  Important Message from Medicare regarding Discharge Appeal Rights: Given to patient/caregiver, Explained to patient/caregiver, Signed/date by patient/caregiver     Date IMM was signed: 06/14/22  Time IMM was signed: 0937

## 2022-06-14 NOTE — PT/OT/SLP PROGRESS
"Physical Therapy Treatment    Patient Name:  Gera Londono   MRN:  [id number]    Recommendations:   Discharge Recommendations:  home health PT   Discharge Equipment Recommendations: none   Barriers to discharge: increased assistance needed  Assessment:   Gera Londono is a 77 y.o. male admitted with a medical diagnosis of Acute blood loss anemia. He presents with the following impairments/functional limitations:  weakness, impaired endurance, impaired functional mobilty, gait instability, impaired balance, impaired cognition, decreased lower extremity function, decreased ROM. Patient tolerated session well. Patient would require increased assistance should they discharge home . Patient would continue to benefit from skilled PT services while in the hospital. At this time, upon d/c recommendation of HHPT  in order for patient to progress towards an improved level of functional mobility independence.     Rehab Prognosis: Good; patient would benefit from acute skilled PT services to address these deficits and reach maximum level of function.    Recent Surgery: Procedure(s) (LRB):  EGD (ESOPHAGOGASTRODUODENOSCOPY) (N/A)  COLONOSCOPY (N/A) 1 Day Post-Op    Plan:     During this hospitalization, patient to be seen 3 x/week to address the identified rehab impairments via gait training, therapeutic activities, therapeutic exercises, neuromuscular re-education, wheelchair management/training and progress toward the following goals:    · Plan of Care Expires:  07/12/22    Subjective     Chief Complaint: none stated  Patient/Family Comments/goals: "I'm feeling good today."  Pain/Comfort:  · Pain Rating 1: 0/10    Objective:   Communicated with RN prior to session.  Gera Londono found supine with peripheral IV, telemetry, Condom Catheter, pulse ox (continuous) (visi monitor) upon PT entry to room.     General Precautions: Standard, fall   Orthopedic Precautions:N/A   Braces: N/A    Vitals:    06/14/22 0739   BP: 115/66 "   Pulse: 91   Resp: 16   Temp: 98.6 °F (37 °C)       Functional Mobility:  · Bed Mobility:    · Supine to Sit: contact guard assistance  · Sit to Supine: stand by assistance  · Patient politely deferring standing. Patient reports preference to slideboard and wheelchair at home.  · Patient performed dynamic reaching seated EOB - 10 reaches total  · Patient performed the following BLE exercises 10x each (EOB):   · Marches, knee extension/flexion (decreased knee extension ROM), ankle pumps    Balance:    Level of assist Total Time  Comments   Static Sitting  SBA Total EOB = 8 min    Dynamic Sitting  SBA Total EOB = 8 min      AM-PAC 6 CLICK MOBILITY  Turning over in bed (including adjusting bedclothes, sheets and blankets)?: 3  Sitting down on and standing up from a chair with arms (e.g., wheelchair, bedside commode, etc.): 1  Moving from lying on back to sitting on the side of the bed?: 3  Moving to and from a bed to a chair (including a wheelchair)?: 2  Need to walk in hospital room?: 1  Climbing 3-5 steps with a railing?: 1  Basic Mobility Total Score: 11     Therapeutic Activities and Education:  -Patient educated on the continued role and goal of PT  -Questions and concerns answered within the the PT scope of practice.        - Patient encouraged to continue to complete mobility in safe range to prevent adverse risks associated with prolonged bed rest   -White board updated in patient room to reflect level of assistance needed with nursing.   -Patient is not clear to transfer with RN/PCT for mobility with CHENTE Londono left supine with all lines intact and call button in reach.    GOALS:   Multidisciplinary Problems     Physical Therapy Goals        Problem: Physical Therapy    Goal Priority Disciplines Outcome Goal Variances Interventions   Physical Therapy Goal     PT, PT/OT Ongoing, Progressing     Description: PT goals until 6/20/22    1. Pt supine to sit with SBA-not met  2. Pt sit to supine with  SBA-not met  3. Pt sit to stand with RW with minimal assist-not met  4. Pt to transfer bed to/from w/c with or without sliding board with minimal assist.-not met  5. Pt to propel w/c 30ft on level surface with B UE with CGA.-not met  6. Pt to perform B LE exs in sitting or supine x 10 reps to strengthen B LE to improve functional mobility.-not met                     Time Tracking:     PT Received On: 06/14/22  PT Start Time: 1002     PT Stop Time: 1015  PT Total Time (min): 13 min     Billable Minutes: Therapeutic Exercise 13    Treatment Type: Treatment  PT/PTA: PT     PTA Visit Number: 0     Ivonne Moya, [facility]  06/14/2022

## 2022-06-14 NOTE — PROGRESS NOTES
[facility] Medicine  Progress Note    Patient Name: Gera Londono  MRN: [id number]  Patient Class: IP- Inpatient   Admission Date: 6/11/2022  Length of Stay: 3 days  Attending Physician: Yue Villagomez*  Primary Care Provider: Gopal Hobbs MD        Subjective:     Principal Problem:Acute blood loss anemia        HPI:  Mr. Londono is a 77 year-old male with HTN, CKD, anemia, and debility who was brought to the ED on 06/11 following an episode of dark/red-appearing stool. He has been in his general state of health until today when he noticed the abnormal bowel movement. Denies associated abdominal pain, constipation, diarrhea, chest pain, shortness of breath. He has not had previous similar episodes. He additionally has never had a colonoscopy or endoscopy before.     ED course: On arrival he was afebrile, normotensive, non-tachycardic, with oxygen saturations at goal on room air. Labs revealed a hemoglobin of 5.7. He was transfused 1U PRBC and admitted to hospital medicine for further monitoring and management of a GI bleed.       Overview/Hospital Course:  Mr. Londono was admitted to hospital medicine for further monitoring and management of a GI bleed. Gastroenterology was consulted- performedEGD/colonoscopy on 06/13 where a large colonic mass was noted and biopsied. Colorectal surgery was consulted for assistance- ordered staging imaging with plans of continuing workup/management in an outpatient setting.        Interval History: No events overnight. HD stable. Hematochezia improving and hgb stable.   -Pending staging imaging and ongoing discussions w family/providers    Review of Systems   Constitutional:  Negative for chills and fever.   HENT:  Negative for congestion and rhinorrhea.    Eyes:  Negative for discharge and visual disturbance.   Respiratory:  Negative for cough and shortness of breath.    Cardiovascular:  Negative for chest pain and leg swelling.   Gastrointestinal:   Positive for blood in stool. Negative for abdominal pain, constipation, diarrhea, nausea, rectal pain and vomiting.   Genitourinary:  Negative for difficulty urinating and dysuria.   Musculoskeletal:  Negative for arthralgias, back pain and myalgias.   Skin:  Negative for pallor and rash.   Neurological:  Negative for dizziness and headaches.   Psychiatric/Behavioral:  Negative for sleep disturbance.    Objective:     Vital Signs (Most Recent):  Temp: 98.6 °F (37 °C) (06/14/22 0739)  Pulse: 91 (06/14/22 0739)  Resp: 16 (06/14/22 0739)  BP: 115/66 (06/14/22 0739)  SpO2: 100 % (06/14/22 0739) Vital Signs (24h Range):  Temp:  [98.2 °F (36.8 °C)-99.3 °F (37.4 °C)] 98.6 °F (37 °C)  Pulse:  [] 91  Resp:  [13-20] 16  SpO2:  [77 %-100 %] 100 %  BP: (102-170)/(55-92) 115/66     Weight: 84.4 kg (186 lb)  Body mass index is 23.88 kg/m².  No intake or output data in the 24 hours ending 06/14/22 1142     Physical Exam  Vitals and nursing note reviewed.   Constitutional:       General: He is not in acute distress.     Appearance: Normal appearance. He is normal weight. He is not ill-appearing, toxic-appearing or diaphoretic.   HENT:      Head: Normocephalic and atraumatic.   Eyes:      General: No scleral icterus.        Right eye: No discharge.         Left eye: No discharge.   Cardiovascular:      Rate and Rhythm: Normal rate and regular rhythm.      Pulses: Normal pulses.      Heart sounds: Normal heart sounds.   Pulmonary:      Effort: Pulmonary effort is normal. No respiratory distress.   Abdominal:      General: Abdomen is flat. Bowel sounds are normal. There is no distension.      Tenderness: There is no abdominal tenderness. There is no guarding.   Musculoskeletal:      Cervical back: No rigidity.      Right lower leg: No edema.      Left lower leg: No edema.   Skin:     General: Skin is warm and dry.      Capillary Refill: Capillary refill takes less than 2 seconds.      Coloration: Skin is not jaundiced.    Neurological:      General: No focal deficit present.      Mental Status: He is alert and oriented to person, place, and time. Mental status is at baseline.   Psychiatric:         Mood and Affect: Mood normal.         Behavior: Behavior normal.     Significant Labs: All pertinent labs within the past 24 hours have been reviewed.  CBC:   Recent Labs   Lab 06/12/22  2333 06/13/22  0445 06/14/22  0401   WBC 5.71 5.25 7.13   HGB 8.2* 8.2* 8.5*   HCT 28.1* 28.7* 30.9*    245 182       CMP:   Recent Labs   Lab 06/13/22  0445 06/14/22  0401    137   K 3.9 4.8    108   CO2 21* 18*   GLU 81 97   BUN 11 13   CREATININE 0.9 1.2   CALCIUM 9.0 8.6*   PROT 7.4 6.9   ALBUMIN 2.8* 2.8*   BILITOT 4.1* 1.0   ALKPHOS 75 66   AST 12 13   ALT 6* 5*   ANIONGAP 12 11   EGFRNONAA >60.0 58.4*       Magnesium:   Recent Labs   Lab 06/13/22  0445 06/14/22  0401   MG 2.1 2.0       Phosphorous:  Recent Labs   Lab 06/13/22  0445 06/14/22  0401   PHOS 2.4* 2.4*         Significant Imaging: I have reviewed all pertinent imaging results/findings within the past 24 hours.    Inpatient Medications:  Continuous Infusions:  Scheduled Meds:   atorvastatin  20 mg Oral Daily    ferrous sulfate  1 tablet Oral Daily    miconazole NITRATE 2 %   Topical (Top) BID    vitamin D  1,000 Units Oral Daily     PRN Meds:sodium chloride, sodium chloride, melatonin, ondansetron, sodium chloride 0.9%        Assessment/Plan:      * Acute blood loss anemia  Mr. Londono is a 77 year-old male with HTN, CKD, anemia, and debility presenting from home after an episode of dark red stools. Hemoglobin on arrival was 5.7 now s/p 1U PRBC. Denies associated abdominal pain, nausea, vomiting, diarrhea. No hx of liver disease, no previous GI bleeds or colonoscopies. Denies family history of colon cancer or other malignancies. Admitted for management of GI bleed. GI consulted.    Underwent colonoscopy 06/13 with a large colonic mass present, which was biopsied,  but was suspicious for malignancy.  CRS was consulted and ordered additional staging workup with plans of continuing outpatient follow-up/mgmt.     PLAN:  -regular diet  -bleeding has subsided, cbc daily/transfuse as needed  -follow staging scans    Age-related physical debility  Per Social Work assessment Patient currently has a HH RN through [place] . He is unable to ambulate independently and is bedridden. Able to self feed but requires assistance for ADLs.No reports of elder abuse or neglect.      PLAN  --Social work consulted, following for possibility of increased HH and caregiving support if available as patient does not prefer nursing home placement. If unable, plan to discuss NH placement with family/POA.  --PT/OT consulted- pending eval    CKD (chronic kidney disease) stage 3, GFR 30-59 ml/min  --creatinine/GFR at baseline  --avoid nephrotoxins, renally-dose medications    Vitamin D deficiency  --vitamin D supplement qd    Hyperlipidemia  --resume home statin      Essential (primary) hypertension  --hold anti-hypertensives as he has been normotensive  --resume when clinically indicated        VTE Risk Mitigation (From admission, onward)         Ordered     Place sequential compression device  Until discontinued         06/11/22 1631     Reason for No Pharmacological VTE Prophylaxis  Once        Question:  Reasons:  Answer:  Active Bleeding  Comment:  GI bleed    06/11/22 1631     IP VTE HIGH RISK PATIENT  Once         06/11/22 1631              Discharge Planning   PARI: 6/14/2022     Code Status: Full Code   Is the patient medically ready for discharge?: No    Reason for patient still in hospital (select all that apply): Pending disposition  Discharge Plan A: Home with family, Home Health, Community Services   Discharge Delays: None known at this time    Shelbie Duran MD  Department of Hospital Medicine   [facility] S[facility]

## 2022-06-14 NOTE — ASSESSMENT & PLAN NOTE
Per Social Work assessment Patient currently has a HH RN through [place] . He is unable to ambulate independently and is bedridden. Able to self feed but requires assistance for ADLs.No reports of elder abuse or neglect.      PLAN  --Social work consulted, following for possibility of increased HH and caregiving support if available as patient does not prefer nursing home placement. If unable, plan to discuss NH placement with family/POA.  --PT/OT consulted- pending eval

## 2022-06-14 NOTE — ASSESSMENT & PLAN NOTE
Mr. Londono is a 77 year-old male with HTN, CKD, anemia, and debility presenting from home after an episode of dark red stools. Hemoglobin on arrival was 5.7 now s/p 1U PRBC. Denies associated abdominal pain, nausea, vomiting, diarrhea. No hx of liver disease, no previous GI bleeds or colonoscopies. Denies family history of colon cancer or other malignancies. Admitted for management of GI bleed. GI consulted.    Underwent colonoscopy 06/13 with a large colonic mass present, which was biopsied, but was suspicious for malignancy.  CRS was consulted and ordered additional staging workup with plans of continuing outpatient follow-up/mgmt.     PLAN:  -regular diet  -bleeding has subsided, cbc daily/transfuse as needed  -follow staging scans

## 2022-06-14 NOTE — SUBJECTIVE & OBJECTIVE
Interval History: No events overnight. HD stable. Hematochezia improving and hgb stable.   -Pending staging imaging and ongoing discussions w family/providers    Review of Systems   Constitutional:  Negative for chills and fever.   HENT:  Negative for congestion and rhinorrhea.    Eyes:  Negative for discharge and visual disturbance.   Respiratory:  Negative for cough and shortness of breath.    Cardiovascular:  Negative for chest pain and leg swelling.   Gastrointestinal:  Positive for blood in stool. Negative for abdominal pain, constipation, diarrhea, nausea, rectal pain and vomiting.   Genitourinary:  Negative for difficulty urinating and dysuria.   Musculoskeletal:  Negative for arthralgias, back pain and myalgias.   Skin:  Negative for pallor and rash.   Neurological:  Negative for dizziness and headaches.   Psychiatric/Behavioral:  Negative for sleep disturbance.    Objective:     Vital Signs (Most Recent):  Temp: 98.6 °F (37 °C) (06/14/22 0739)  Pulse: 91 (06/14/22 0739)  Resp: 16 (06/14/22 0739)  BP: 115/66 (06/14/22 0739)  SpO2: 100 % (06/14/22 0739) Vital Signs (24h Range):  Temp:  [98.2 °F (36.8 °C)-99.3 °F (37.4 °C)] 98.6 °F (37 °C)  Pulse:  [] 91  Resp:  [13-20] 16  SpO2:  [77 %-100 %] 100 %  BP: (102-170)/(55-92) 115/66     Weight: 84.4 kg (186 lb)  Body mass index is 23.88 kg/m².  No intake or output data in the 24 hours ending 06/14/22 1142     Physical Exam  Vitals and nursing note reviewed.   Constitutional:       General: He is not in acute distress.     Appearance: Normal appearance. He is normal weight. He is not ill-appearing, toxic-appearing or diaphoretic.   HENT:      Head: Normocephalic and atraumatic.   Eyes:      General: No scleral icterus.        Right eye: No discharge.         Left eye: No discharge.   Cardiovascular:      Rate and Rhythm: Normal rate and regular rhythm.      Pulses: Normal pulses.      Heart sounds: Normal heart sounds.   Pulmonary:      Effort: Pulmonary  effort is normal. No respiratory distress.   Abdominal:      General: Abdomen is flat. Bowel sounds are normal. There is no distension.      Tenderness: There is no abdominal tenderness. There is no guarding.   Musculoskeletal:      Cervical back: No rigidity.      Right lower leg: No edema.      Left lower leg: No edema.   Skin:     General: Skin is warm and dry.      Capillary Refill: Capillary refill takes less than 2 seconds.      Coloration: Skin is not jaundiced.   Neurological:      General: No focal deficit present.      Mental Status: He is alert and oriented to person, place, and time. Mental status is at baseline.   Psychiatric:         Mood and Affect: Mood normal.         Behavior: Behavior normal.     Significant Labs: All pertinent labs within the past 24 hours have been reviewed.  CBC:   Recent Labs   Lab 06/12/22  2333 06/13/22 0445 06/14/22  0401   WBC 5.71 5.25 7.13   HGB 8.2* 8.2* 8.5*   HCT 28.1* 28.7* 30.9*    245 182       CMP:   Recent Labs   Lab 06/13/22 0445 06/14/22  0401    137   K 3.9 4.8    108   CO2 21* 18*   GLU 81 97   BUN 11 13   CREATININE 0.9 1.2   CALCIUM 9.0 8.6*   PROT 7.4 6.9   ALBUMIN 2.8* 2.8*   BILITOT 4.1* 1.0   ALKPHOS 75 66   AST 12 13   ALT 6* 5*   ANIONGAP 12 11   EGFRNONAA >60.0 58.4*       Magnesium:   Recent Labs   Lab 06/13/22 0445 06/14/22  0401   MG 2.1 2.0       Phosphorous:  Recent Labs   Lab 06/13/22 0445 06/14/22  0401   PHOS 2.4* 2.4*         Significant Imaging: I have reviewed all pertinent imaging results/findings within the past 24 hours.    Inpatient Medications:  Continuous Infusions:  Scheduled Meds:   atorvastatin  20 mg Oral Daily    ferrous sulfate  1 tablet Oral Daily    miconazole NITRATE 2 %   Topical (Top) BID    vitamin D  1,000 Units Oral Daily     PRN Meds:sodium chloride, sodium chloride, melatonin, ondansetron, sodium chloride 0.9%

## 2022-06-14 NOTE — PT/OT/SLP PROGRESS
"Occupational Therapy   Treatment    Name: Gera Londono  MRN: [id number]  Admitting Diagnosis:  Acute blood loss anemia  1 Day Post-Op    Recommendations:     Discharge Recommendations: home health OT  Discharge Equipment Recommendations:  none  Barriers to discharge:  None    Assessment:     Gera Londono is a 77 y.o. male with a medical diagnosis of Acute blood loss anemia.  He presents with the following performance deficits affecting function: weakness, impaired endurance, impaired self care skills, impaired functional mobilty, gait instability, impaired balance, impaired cognition, decreased coordination. Pt agreeable to session following encouragement. Pt performed bed mobility and UE exercises EOB before returning himself to bed despite encouragement to continue sitting up. Pt was agreeable to perform LE dressing and finish UE exercises in bed. Pt demonstrated impaired fine motor coordination with finger opposition; however, pt showed improved performance following verbal and visual cues. At this time, patient will continue to benefit from acute skilled therapy intervention to address deficits/underlying impairments and progress towards prior level of function. After discharge, patient will benefit from receiving home health therapy services to ensure safety and independence in the home environment.    Rehab Prognosis:  Good; patient would benefit from acute skilled OT services to address these deficits and reach maximum level of function.       Plan:     Patient to be seen 3 x/week to address the above listed problems via self-care/home management, therapeutic activities, therapeutic exercises  · Plan of Care Expires: 07/12/22  · Plan of Care Reviewed with: patient    Subjective   "That feels good" pt stated when performing UE AROM exercises  Pain/Comfort:  · Pain Rating 1: 0/10    Objective:     Communicated with: RN prior to session.  Patient found HOB elevated with telemetry, Condom Catheter upon OT entry " to room.    General Precautions: Standard, fall   Orthopedic Precautions:N/A   Braces: N/A  Respiratory Status: Room air     Occupational Performance:     Bed Mobility:    · Patient completed Scooting/Bridging towards HOB with minimum assistance and verbal cues  · Patient required verbal cues to push through his legs in order to scoot in bed   · Patient completed Supine to Sit with minimum assistance  · Patient required verbal cues to reach for bed rail and push off of bed to sit up    · Patient completed Sit to Supine with stand by assistance     Functional Mobility/Transfers:  · Functional Mobility: not assessed 2* pt uses w/c for mobility at baseline     Balance:   Sitting: SBA for static and dynamic balance sitting EOB    Activities of Daily Living:  · Grooming: stand by assistance to wash face sitting EOB  · Grooming: minimum assistance to apply lotion to legs and feet supine in bed using figure 4 technique  · Lower Body Dressing: minimum assistance to don/doff socks supine in bed using figure 4 technique       [facility] 6 Click ADL: 16    Treatment & Education:  - Discussed OT POC and answered all questions within OT scope of practice   - Instructed patient to use call light to have nursing staff assist with needs/transfers  - Education on energy conservation and task modification to maximize safety and independence  - Patient performed the following UE AROM:   -1x10 shoulder flexion sitting EOB; 1x10 shoulder flexion in bed with HOB elevated    -1x10 forward punches sitting EOB; 1x10 forward punches in bed with HOB elevated    -1x10 shoulder abduction in bed with HOB elevated    -1x10 bicep curls in bed with HOB elevated   -1x3 B finger opposition; verbal and visual cues required for task completion   - Instructed pt to perform UE exercises in bed throughout the day to improve UE function       Patient left HOB elevated with all lines intact and call button in reachEducation:      GOALS:   Multidisciplinary  Problems     Occupational Therapy Goals        Problem: Occupational Therapy    Goal Priority Disciplines Outcome Interventions   Occupational Therapy Goal     OT, PT/OT Ongoing, Progressing    Description: Goals to be met by: 6/26/22     Patient will increase functional independence with ADLs by performing:    Feeding with [place].  UE Dressing with Moderate Assistance.  LE Dressing with Moderate Assistance.  Grooming while EOB with Minimal Assistance.  Slide board t/f with min A                   Time Tracking:     OT Date of Treatment: 06/14/22  OT Start Time: 1041  OT Stop Time: 1104  OT Total Time (min): 23 min    Billable Minutes:Self Care/Home Management 10  Therapeutic Exercise 13    OT/GENNY: OT     GENNY Visit Number: 0    6/14/2022

## 2022-06-14 NOTE — PLAN OF CARE
Problem: Adult Inpatient Plan of Care  Goal: Plan of Care Review  Outcome: Ongoing, Progressing  Goal: Patient-Specific Goal (Individualized)  Outcome: Ongoing, Progressing  Goal: Absence of Hospital-Acquired Illness or Injury  Outcome: Ongoing, Progressing  Goal: Optimal Comfort and Wellbeing  Outcome: Ongoing, Progressing  Goal: Readiness for Transition of Care  Outcome: Ongoing, Progressing     Problem: Skin Injury Risk Increased  Goal: Skin Health and Integrity  Outcome: Ongoing, Progressing     Problem: Bleeding (Gastrointestinal Bleeding)  Goal: Hemostasis  Outcome: Ongoing, Progressing

## 2022-06-14 NOTE — PLAN OF CARE
[facility]  HOME HEALTH ORDERS  FACE TO FACE ENCOUNTER    Patient Name: Gera Londono  YOB: 1945    PCP: Gopal Hobbs MD   PCP Address: [address]  PCP Phone Number: [phone]  PCP Fax: [phone]    Encounter Date: 6/11/22    Admit to Home Health    Diagnoses:  Active Hospital Problems    Diagnosis  POA    *Acute blood loss anemia [D62]  Yes    Rectal mass [K62.89]  Yes    Microcytic anemia [D50.9]  Yes    Osteoarthritis [M19.90]  Yes     Chronic    CKD (chronic kidney disease) stage 3, GFR 30-59 ml/min [N18.30]  Yes     Chronic    Age-related physical debility [R54]  Yes     Chronic    Hyperlipidemia [E78.5]  Yes     Chronic    Vitamin D deficiency [E55.9]  Yes     Chronic    Essential (primary) hypertension [I10]  Yes     Chronic      Resolved Hospital Problems   No resolved problems to display.     Follow Up Appointments:  Future Appointments   Date Time Provider Department Center   6/15/2022  8:00 AM Ciro Saldivar NP [address]   7/5/2022 10:00 AM [email or web address]  [facility]     Allergies:Review of patient's allergies indicates:  No Known Allergies    Medications: Review discharge medications with patient and family and provide education.    Current Facility-Administered Medications   Medication Dose Route Frequency Provider Last Rate Last Admin    0.9%  NaCl infusion (for blood administration)   Intravenous Q24H PRN Lyssa Alvarez PA-C        0.9%  NaCl infusion (for blood administration)   Intravenous Q24H PRN Yasmany Linn MD        atorvastatin tablet 20 mg  20 mg Oral Daily Denver R Deacon, DO   20 mg at 06/14/22 0941    ferrous sulfate tablet 1 each  1 tablet Oral Daily Denver R Deacon, DO   1 each at 06/14/22 0941    melatonin tablet 6 mg  6 mg Oral Nightly PRN Lyssa Alvarez PA-C        miconazole NITRATE 2 % top powder   Topical (Top) BID Lyssa Alvarez PA-C   Given  at 06/14/22 0942    ondansetron injection 4 mg  4 mg Intravenous Once PRN Yasmany Linn MD        sodium chloride 0.9% flush 10 mL  10 mL Intravenous PRN Lyssa Alvarez PA-C   10 mL at 06/11/22 1728    vitamin D 1000 units tablet 1,000 Units  1,000 Units Oral Daily Denver ROCK Worthy DO   1,000 Units at 06/14/22 0942     Current Discharge Medication List      CONTINUE these medications which have CHANGED    Details   ferrous sulfate (FEROSUL) 325 mg (65 mg iron) Tab tablet TAKE 1 TABLET (325 MG TOTAL) BY MOUTH 2 (TWO) TIMES DAILY. FOR ANEMIA  Qty: 180 tablet, Refills: 3    Associated Diagnoses: Iron deficiency anemia secondary to inadequate dietary iron intake      losartan (COZAAR) 25 MG tablet Take 1 tablet (25 mg total) by mouth once daily. HOLD UNTIL SEEN BY YOUR PRIMARY CARE DOCTOR  Qty: 90 tablet, Refills: 3    Associated Diagnoses: Essential (primary) hypertension         CONTINUE these medications which have NOT CHANGED    Details   acetaminophen (TYLENOL) 500 MG tablet Take 500 mg by mouth 2 (two) times a day.      atorvastatin (LIPITOR) 20 MG tablet TAKE 1 TABLET EVERY DAY  Qty: 90 tablet, Refills: 3    Associated Diagnoses: Other hyperlipidemia      cetirizine (ZYRTEC) 10 MG tablet Take 10 mg by mouth daily as needed for Allergies.      ergocalciferol (ERGOCALCIFEROL) 50,000 unit Cap TAKE 1 CAPSULE EVERY 7 DAYS  Qty: 12 capsule, Refills: 1    Associated Diagnoses: Vitamin D deficiency; Annual physical exam; Hypercalcemia      walker Misc 1 each by Misc.(Non-Drug; Combo Route) route once daily.  Qty: 1 each, Refills: 0    Comments: Rolling Walker  Associated Diagnoses: Age-related physical debility; Osteoarthritis, unspecified osteoarthritis type, unspecified site; Decreased mobility           I have seen and examined this patient within the last 30 days. My clinical findings that support the need for the home health skilled services and home bound status are the following:no   Weakness/numbness  causing balance and gait disturbance due to Weakness/Debility, Anemia and Malignancy/Cancer making it taxing to leave home.  Requiring assistive device to leave home due to unsteady gait caused by  Weakness/Debility, Anemia and Malignancy/Cancer.     Diet:   regular diet    Referrals/ Consults  Physical Therapy to evaluate and treat. Evaluate for home safety and equipment needs; Establish/upgrade home exercise program. Perform / instruct on therapeutic exercises, gait training, transfer training, and Range of Motion.  Occupational Therapy to evaluate and treat. Evaluate home environment for safety and equipment needs. Perform/Instruct on transfers, ADL training, ROM, and therapeutic exercises.   to evaluate for community resources/long-range planning.  Aide to provide assistance with personal care, ADLs, and vital signs.    Activities:   activity as tolerated    Nursing:   Agency to admit patient within 24 hours of hospital discharge unless specified on physician order or at patient request    SN to complete comprehensive assessment including routine vital signs. Instruct on disease process and s/s of complications to report to MD. Review/verify medication list sent home with the patient at time of discharge  and instruct patient/caregiver as needed. Frequency may be adjusted depending on start of care date.     Skilled nurse to perform up to 3 visits PRN for symptoms related to diagnosis    Ok to schedule additional visits based on staff availability and patient request on consecutive days within the home health episode.    When multiple disciplines ordered:    Start of Care occurs on Sunday - Wednesday schedule remaining discipline evaluations as ordered on separate consecutive days following the start of care.    Thursday SOC -schedule subsequent evaluations Friday and Monday the following week.     Friday - Saturday SOC - schedule subsequent discipline evaluations on consecutive days starting Monday  of the following week.    For all post-discharge communication and subsequent orders please contact patient's primary care physician. If unable to reach primary care physician or do not receive response within 30 minutes, please contact [facility] for clinical staff order clarification    Home Health Aide:  Physical Therapy Three times weekly, Occupational Therapy Three times weekly, Medical Social Work Twice weekly and Home Health Aide Three times weekly    Wound Care Orders  no    I certify that this patient is confined to his home and needs intermittent skilled nursing care, physical therapy and occupational therapy.

## 2022-06-15 LAB
ALBUMIN SERPL BCP-MCNC: 2.9 G/DL (ref 3.5–5.2)
ALP SERPL-CCNC: 75 U/L (ref 55–135)
ALT SERPL W/O P-5'-P-CCNC: 5 U/L (ref 10–44)
ANION GAP SERPL CALC-SCNC: 10 MMOL/L (ref 8–16)
AST SERPL-CCNC: 12 U/L (ref 10–40)
BASOPHILS # BLD AUTO: 0.04 K/UL (ref 0–0.2)
BASOPHILS NFR BLD: 0.4 % (ref 0–1.9)
BILIRUB SERPL-MCNC: 0.8 MG/DL (ref 0.1–1)
BLD PROD TYP BPU: NORMAL
BLOOD UNIT EXPIRATION DATE: NORMAL
BLOOD UNIT TYPE CODE: 1700
BLOOD UNIT TYPE: NORMAL
BUN SERPL-MCNC: 14 MG/DL (ref 8–23)
CALCIUM SERPL-MCNC: 8.8 MG/DL (ref 8.7–10.5)
CHLORIDE SERPL-SCNC: 102 MMOL/L (ref 95–110)
CO2 SERPL-SCNC: 23 MMOL/L (ref 23–29)
CODING SYSTEM: NORMAL
CREAT SERPL-MCNC: 1.2 MG/DL (ref 0.5–1.4)
DIFFERENTIAL METHOD: ABNORMAL
DISPENSE STATUS: NORMAL
EOSINOPHIL # BLD AUTO: 0.1 K/UL (ref 0–0.5)
EOSINOPHIL NFR BLD: 0.8 % (ref 0–8)
ERYTHROCYTE [DISTWIDTH] IN BLOOD BY AUTOMATED COUNT: 20.1 % (ref 11.5–14.5)
EST. GFR  (AFRICAN AMERICAN): >60 ML/MIN/1.73 M^2
EST. GFR  (NON AFRICAN AMERICAN): 58.4 ML/MIN/1.73 M^2
GLUCOSE SERPL-MCNC: 113 MG/DL (ref 70–110)
HCT VFR BLD AUTO: 29.8 % (ref 40–54)
HGB BLD-MCNC: 8.4 G/DL (ref 14–18)
IMM GRANULOCYTES # BLD AUTO: 0.04 K/UL (ref 0–0.04)
IMM GRANULOCYTES NFR BLD AUTO: 0.4 % (ref 0–0.5)
LYMPHOCYTES # BLD AUTO: 1 K/UL (ref 1–4.8)
LYMPHOCYTES NFR BLD: 10.5 % (ref 18–48)
MAGNESIUM SERPL-MCNC: 2 MG/DL (ref 1.6–2.6)
MCH RBC QN AUTO: 20.9 PG (ref 27–31)
MCHC RBC AUTO-ENTMCNC: 28.2 G/DL (ref 32–36)
MCV RBC AUTO: 74 FL (ref 82–98)
MONOCYTES # BLD AUTO: 0.6 K/UL (ref 0.3–1)
MONOCYTES NFR BLD: 6.6 % (ref 4–15)
NEUTROPHILS # BLD AUTO: 7.8 K/UL (ref 1.8–7.7)
NEUTROPHILS NFR BLD: 81.3 % (ref 38–73)
NRBC BLD-RTO: 0 /100 WBC
PHOSPHATE SERPL-MCNC: 2.4 MG/DL (ref 2.7–4.5)
PLATELET # BLD AUTO: 248 K/UL (ref 150–450)
PMV BLD AUTO: 9.5 FL (ref 9.2–12.9)
POTASSIUM SERPL-SCNC: 4.3 MMOL/L (ref 3.5–5.1)
PROT SERPL-MCNC: 7.3 G/DL (ref 6–8.4)
RBC # BLD AUTO: 4.02 M/UL (ref 4.6–6.2)
SODIUM SERPL-SCNC: 135 MMOL/L (ref 136–145)
TRANS ERYTHROCYTES VOL PATIENT: NORMAL ML
WBC # BLD AUTO: 9.6 K/UL (ref 3.9–12.7)

## 2022-06-15 PROCEDURE — 80053 COMPREHEN METABOLIC PANEL: CPT | Performed by: STUDENT IN AN ORGANIZED HEALTH CARE EDUCATION/TRAINING PROGRAM

## 2022-06-15 PROCEDURE — 99232 SBSQ HOSP IP/OBS MODERATE 35: CPT | Mod: GC,,, | Performed by: HOSPITALIST

## 2022-06-15 PROCEDURE — 84100 ASSAY OF PHOSPHORUS: CPT | Performed by: STUDENT IN AN ORGANIZED HEALTH CARE EDUCATION/TRAINING PROGRAM

## 2022-06-15 PROCEDURE — 25000003 PHARM REV CODE 250: Performed by: STUDENT IN AN ORGANIZED HEALTH CARE EDUCATION/TRAINING PROGRAM

## 2022-06-15 PROCEDURE — 83735 ASSAY OF MAGNESIUM: CPT | Performed by: STUDENT IN AN ORGANIZED HEALTH CARE EDUCATION/TRAINING PROGRAM

## 2022-06-15 PROCEDURE — 11000001 HC ACUTE MED/SURG PRIVATE ROOM

## 2022-06-15 PROCEDURE — 25000003 PHARM REV CODE 250: Performed by: PHYSICIAN ASSISTANT

## 2022-06-15 PROCEDURE — 36415 COLL VENOUS BLD VENIPUNCTURE: CPT | Performed by: STUDENT IN AN ORGANIZED HEALTH CARE EDUCATION/TRAINING PROGRAM

## 2022-06-15 PROCEDURE — 85025 COMPLETE CBC W/AUTO DIFF WBC: CPT | Performed by: STUDENT IN AN ORGANIZED HEALTH CARE EDUCATION/TRAINING PROGRAM

## 2022-06-15 PROCEDURE — 99232 PR SUBSEQUENT HOSPITAL CARE,LEVL II: ICD-10-PCS | Mod: GC,,, | Performed by: HOSPITALIST

## 2022-06-15 RX ORDER — SODIUM,POTASSIUM PHOSPHATES 280-250MG
2 POWDER IN PACKET (EA) ORAL EVERY 4 HOURS
Status: COMPLETED | OUTPATIENT
Start: 2022-06-15 | End: 2022-06-15

## 2022-06-15 RX ADMIN — POTASSIUM & SODIUM PHOSPHATES POWDER PACK 280-160-250 MG 2 PACKET: 280-160-250 PACK at 09:06

## 2022-06-15 RX ADMIN — MICONAZOLE NITRATE 2 % TOPICAL POWDER: at 08:06

## 2022-06-15 RX ADMIN — Medication 1000 UNITS: at 08:06

## 2022-06-15 RX ADMIN — FERROUS SULFATE TAB 325 MG (65 MG ELEMENTAL FE) 1 EACH: 325 (65 FE) TAB at 08:06

## 2022-06-15 RX ADMIN — Medication 6 MG: at 08:06

## 2022-06-15 RX ADMIN — ATORVASTATIN CALCIUM 20 MG: 20 TABLET, FILM COATED ORAL at 08:06

## 2022-06-15 RX ADMIN — POTASSIUM & SODIUM PHOSPHATES POWDER PACK 280-160-250 MG 2 PACKET: 280-160-250 PACK at 06:06

## 2022-06-15 NOTE — SUBJECTIVE & OBJECTIVE
Interval History: No acute events overnight, afebrile, hemodynamically stable. Reports episode of hematochezia. Hemoglobin stable.       Review of Systems   Constitutional:  Negative for chills and fever.   HENT:  Negative for congestion and rhinorrhea.    Eyes:  Negative for discharge and visual disturbance.   Respiratory:  Negative for cough and shortness of breath.    Cardiovascular:  Negative for chest pain and leg swelling.   Gastrointestinal:  Positive for blood in stool. Negative for abdominal pain, constipation, diarrhea, nausea, rectal pain and vomiting.   Genitourinary:  Negative for difficulty urinating and dysuria.   Musculoskeletal:  Positive for gait problem. Negative for arthralgias and back pain.   Skin:  Negative for pallor and rash.   Neurological:  Negative for dizziness and headaches.   Psychiatric/Behavioral:  Negative for sleep disturbance.    Objective:     Vital Signs (Most Recent):  Temp: 98.7 °F (37.1 °C) (06/15/22 1129)  Pulse: 96 (06/15/22 1129)  Resp: 20 (06/15/22 1129)  BP: (!) 144/72 (06/15/22 1129)  SpO2: 99 % (06/15/22 1129) Vital Signs (24h Range):  Temp:  [96.5 °F (35.8 °C)-99.1 °F (37.3 °C)] 98.7 °F (37.1 °C)  Pulse:  [] 96  Resp:  [16-20] 20  SpO2:  [97 %-100 %] 99 %  BP: (133-169)/(66-88) 144/72     Weight: 84.4 kg (186 lb)  Body mass index is 23.88 kg/m².  No intake or output data in the 24 hours ending 06/15/22 1522     Physical Exam  Vitals and nursing note reviewed.   Constitutional:       General: He is not in acute distress.     Appearance: Normal appearance. He is normal weight. He is not ill-appearing, toxic-appearing or diaphoretic.   HENT:      Head: Normocephalic and atraumatic.   Eyes:      General: No scleral icterus.        Right eye: No discharge.         Left eye: No discharge.   Cardiovascular:      Rate and Rhythm: Normal rate and regular rhythm.      Pulses: Normal pulses.      Heart sounds: Normal heart sounds.   Pulmonary:      Effort: Pulmonary effort  is normal. No respiratory distress.   Abdominal:      General: Abdomen is flat. Bowel sounds are normal. There is no distension.      Tenderness: There is no abdominal tenderness. There is no guarding.   Musculoskeletal:      Cervical back: No rigidity.      Right lower leg: No edema.      Left lower leg: No edema.   Skin:     General: Skin is warm and dry.      Capillary Refill: Capillary refill takes less than 2 seconds.      Coloration: Skin is not jaundiced.   Neurological:      Mental Status: He is alert and oriented to person, place, and time. Mental status is at baseline.   Psychiatric:         Mood and Affect: Mood normal.         Behavior: Behavior normal.     Significant Labs: All pertinent labs within the past 24 hours have been reviewed.  CBC:   Recent Labs   Lab 06/14/22  0401 06/15/22  0419   WBC 7.13 9.60   HGB 8.5* 8.4*   HCT 30.9* 29.8*    248     CMP:   Recent Labs   Lab 06/14/22  0401 06/15/22  0419    135*   K 4.8 4.3    102   CO2 18* 23   GLU 97 113*   BUN 13 14   CREATININE 1.2 1.2   CALCIUM 8.6* 8.8   PROT 6.9 7.3   ALBUMIN 2.8* 2.9*   BILITOT 1.0 0.8   ALKPHOS 66 75   AST 13 12   ALT 5* 5*   ANIONGAP 11 10   EGFRNONAA 58.4* 58.4*     Magnesium:   Recent Labs   Lab 06/14/22  0401 06/15/22  0419   MG 2.0 2.0     Phosphorous:  Recent Labs   Lab 06/14/22  0401 06/15/22  0419   PHOS 2.4* 2.4*     POCT Glucose:   No results for input(s): POCTGLUCOSE in the last 48 hours.    Significant Imaging: I have reviewed all pertinent imaging results/findings within the past 24 hours.    Inpatient Medications:  Continuous Infusions:  Scheduled Meds:   atorvastatin  20 mg Oral Daily    ferrous sulfate  1 tablet Oral Daily    miconazole NITRATE 2 %   Topical (Top) BID    vitamin D  1,000 Units Oral Daily     PRN Meds:sodium chloride, sodium chloride, melatonin, ondansetron, sodium chloride 0.9%

## 2022-06-15 NOTE — CARE UPDATE
RAPID RESPONSE NURSE ROUND       Rounding completed with charge RN, Sam. No concerns verbalized at this time. Instructed to call [phone] for further concerns or assistance.

## 2022-06-15 NOTE — PROGRESS NOTES
[facility] Medicine  Progress Note    Patient Name: Gera Londono  MRN: [id number]  Patient Class: IP- Inpatient   Admission Date: 6/11/2022  Length of Stay: 4 days  Attending Physician: Yue Villagomez*  Primary Care Provider: Gopal Hobbs MD        Subjective:     Principal Problem:Acute blood loss anemia        HPI:  Mr. Londono is a 77 year-old male with HTN, CKD, anemia, and debility who was brought to the ED on 06/11 following an episode of dark/red-appearing stool. He has been in his general state of health until today when he noticed the abnormal bowel movement. Denies associated abdominal pain, constipation, diarrhea, chest pain, shortness of breath. He has not had previous similar episodes. He additionally has never had a colonoscopy or endoscopy before.     ED course: On arrival he was afebrile, normotensive, non-tachycardic, with oxygen saturations at goal on room air. Labs revealed a hemoglobin of 5.7. He was transfused 1U PRBC and admitted to hospital medicine for further monitoring and management of a GI bleed.       Overview/Hospital Course:  Mr. Londono was admitted to hospital medicine for further monitoring and management of a GI bleed. Gastroenterology was consulted- performed EGD/colonoscopy on 06/13 where a large colonic mass was noted and biopsied. Colorectal surgery was consulted for assistance- ordered staging imaging with plans of continuing workup/management in an outpatient setting.  CT chest w/o contrast showed no evidence of metastatic disease. CT lumbar spine showed no concerning lytic or blastic lesions to suggest metastatic disease. 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 Pending biopsy results and disposition.      Interval History: No acute events overnight, afebrile, hemodynamically stable. Reports episode of hematochezia. Hemoglobin stable.        Review of Systems   Constitutional:  Negative for chills and fever.   HENT:  Negative for congestion and rhinorrhea.    Eyes:  Negative for discharge and visual disturbance.   Respiratory:  Negative for cough and shortness of breath.    Cardiovascular:  Negative for chest pain and leg swelling.   Gastrointestinal:  Positive for blood in stool. Negative for abdominal pain, constipation, diarrhea, nausea, rectal pain and vomiting.   Genitourinary:  Negative for difficulty urinating and dysuria.   Musculoskeletal:  Positive for gait problem. Negative for arthralgias and back pain.   Skin:  Negative for pallor and rash.   Neurological:  Negative for dizziness and headaches.   Psychiatric/Behavioral:  Negative for sleep disturbance.    Objective:     Vital Signs (Most Recent):  Temp: 98.7 °F (37.1 °C) (06/15/22 1129)  Pulse: 96 (06/15/22 1129)  Resp: 20 (06/15/22 1129)  BP: (!) 144/72 (06/15/22 1129)  SpO2: 99 % (06/15/22 1129) Vital Signs (24h Range):  Temp:  [96.5 °F (35.8 °C)-99.1 °F (37.3 °C)] 98.7 °F (37.1 °C)  Pulse:  [] 96  Resp:  [16-20] 20  SpO2:  [97 %-100 %] 99 %  BP: (133-169)/(66-88) 144/72     Weight: 84.4 kg (186 lb)  Body mass index is 23.88 kg/m².  No intake or output data in the 24 hours ending 06/15/22 1522     Physical Exam  Vitals and nursing note reviewed.   Constitutional:       General: He is not in acute distress.     Appearance: Normal appearance. He is normal weight. He is not ill-appearing, toxic-appearing or diaphoretic.   HENT:      Head: Normocephalic and atraumatic.   Eyes:      General: No scleral icterus.        Right eye: No discharge.         Left eye: No discharge.   Cardiovascular:      Rate and Rhythm: Normal rate and regular rhythm.      Pulses: Normal pulses.      Heart sounds: Normal heart sounds.   Pulmonary:      Effort: Pulmonary effort is normal. No respiratory distress.   Abdominal:      General: Abdomen is flat. Bowel sounds are normal. There is no distension.       Tenderness: There is no abdominal tenderness. There is no guarding.   Musculoskeletal:      Cervical back: No rigidity.      Right lower leg: No edema.      Left lower leg: No edema.   Skin:     General: Skin is warm and dry.      Capillary Refill: Capillary refill takes less than 2 seconds.      Coloration: Skin is not jaundiced.   Neurological:      Mental Status: He is alert and oriented to person, place, and time. Mental status is at baseline.   Psychiatric:         Mood and Affect: Mood normal.         Behavior: Behavior normal.     Significant Labs: All pertinent labs within the past 24 hours have been reviewed.  CBC:   Recent Labs   Lab 06/14/22  0401 06/15/22  0419   WBC 7.13 9.60   HGB 8.5* 8.4*   HCT 30.9* 29.8*    248     CMP:   Recent Labs   Lab 06/14/22  0401 06/15/22  0419    135*   K 4.8 4.3    102   CO2 18* 23   GLU 97 113*   BUN 13 14   CREATININE 1.2 1.2   CALCIUM 8.6* 8.8   PROT 6.9 7.3   ALBUMIN 2.8* 2.9*   BILITOT 1.0 0.8   ALKPHOS 66 75   AST 13 12   ALT 5* 5*   ANIONGAP 11 10   EGFRNONAA 58.4* 58.4*     Magnesium:   Recent Labs   Lab 06/14/22  0401 06/15/22  0419   MG 2.0 2.0     Phosphorous:  Recent Labs   Lab 06/14/22  0401 06/15/22  0419   PHOS 2.4* 2.4*     POCT Glucose:   No results for input(s): POCTGLUCOSE in the last 48 hours.    Significant Imaging: I have reviewed all pertinent imaging results/findings within the past 24 hours.    Inpatient Medications:  Continuous Infusions:  Scheduled Meds:   atorvastatin  20 mg Oral Daily    ferrous sulfate  1 tablet Oral Daily    miconazole NITRATE 2 %   Topical (Top) BID    vitamin D  1,000 Units Oral Daily     PRN Meds:sodium chloride, sodium chloride, melatonin, ondansetron, sodium chloride 0.9%        Assessment/Plan:      * Acute blood loss anemia  Mr. Londono is a 77 year-old male with HTN, CKD, anemia, and debility presenting from home after an episode of dark red stools. Hemoglobin on arrival was 5.7 now s/p  1U PRBC. Denies associated abdominal pain, nausea, vomiting, diarrhea. No hx of liver disease, no previous GI bleeds or colonoscopies. Denies family history of colon cancer or other malignancies. Admitted for management of GI bleed. GI consulted.    -Underwent colonoscopy 06/13 with a large colonic mass present, which was biopsied, but was suspicious for malignancy.  -CRS was consulted and ordered additional staging workup with plans of continuing outpatient follow-up/mgmt.   -CT chest w/o contrast showed no evidence of metastatic disease. CT lumbar spine showed no concerning lytic or blastic lesions to suggest metastatic disease. 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    PLAN:  -Regular diet  -Cbc daily/transfuse as needed  -Outpatient followup [facility]    Essential (primary) hypertension  --hold anti-hypertensives as he has been normotensive  --resume when clinically indicated      Age-related physical debility  Per Social Work assessment Patient currently has a HH RN through [facility]. He is unable to ambulate independently and is bedridden. Able to self feed but requires assistance for ADLs.No reports of elder abuse or neglect.      PLAN  --Social work consulted, following for possibility of increased HH and caregiving support if available as patient does not prefer nursing home placement. If unable, plan to discuss NH placement with family/POA.  --PT/OT consulted- pending eval    CKD (chronic kidney disease) stage 3, GFR 30-59 ml/min  --creatinine/GFR at baseline  --avoid nephrotoxins, renally-dose medications    Vitamin D deficiency  --vitamin D supplement qd    Hyperlipidemia  --resume home statin      VTE Risk Mitigation (From admission, onward)         Ordered     Place sequential compression device  Until discontinued         06/11/22 4411     Reason for No Pharmacological VTE Prophylaxis  Once         Question:  Reasons:  Answer:  Active Bleeding  Comment:  GI bleed    06/11/22 1631     IP VTE HIGH RISK PATIENT  Once         06/11/22 1631                Discharge Planning   PARI: 6/16/2022     Code Status: Full Code   Is the patient medically ready for discharge?: Yes    Reason for patient still in hospital (select all that apply): Pending disposition  Discharge Plan A: Home with family, Home Health, Community Services   Discharge Delays: None known at this time        Denver Worthy DO  Department of Hospital Medicine   [facility]

## 2022-06-15 NOTE — ASSESSMENT & PLAN NOTE
Mr. Londono is a 77 year-old male with HTN, CKD, anemia, and debility presenting from home after an episode of dark red stools. Hemoglobin on arrival was 5.7 now s/p 1U PRBC. Denies associated abdominal pain, nausea, vomiting, diarrhea. No hx of liver disease, no previous GI bleeds or colonoscopies. Denies family history of colon cancer or other malignancies. Admitted for management of GI bleed. GI consulted.    -Underwent colonoscopy 06/13 with a large colonic mass present, which was biopsied, but was suspicious for malignancy.  -CRS was consulted and ordered additional staging workup with plans of continuing outpatient follow-up/mgmt.   -CT chest w/o contrast showed no evidence of metastatic disease. CT lumbar spine showed no concerning lytic or blastic lesions to suggest metastatic disease. 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    PLAN:  -Regular diet  -Cbc daily/transfuse as needed  -Outpatient followup [facility]

## 2022-06-15 NOTE — PLAN OF CARE
Alexis spoke with beth Cole by phone, Pt has a waiver application completed and is waiting for the state to approve. Sw will leave sitter resources and list by bedside. Also informed Srini MARTINEZ will see Pt at home after dc. Beth verbalized understanding, asked medical team to contact her, Alexis relayed the message and will follow.

## 2022-06-15 NOTE — PLAN OF CARE
[facility]  Discharge Final Note    Primary Care Provider: Gopal Hobbs MD    Expected Discharge Date: 6/15/2022     Future Appointments   Date Time Provider Department Center   7/5/2022 10:00 AM [facility] PR[facility]   7/7/2022  8:00 AM Ciro Saldivar NP [facility] [place]       Final Discharge Note (most recent)     Final Note - 06/15/22 0940        Final Note    Assessment Type Final Discharge Note     Anticipated Discharge Disposition Home-Health Care Svc     What phone number can be called within the next 1-3 days to see how you are doing after discharge? [phone]     Hospital Resources/Appts/Education Provided Post-Acute resouces added to AVS;Appointments scheduled and added to AVS        Post-Acute Status    Post-Acute Authorization Home Health     Home Health Status Set-up Complete/Auth obtained     Discharge Delays None known at this time                 Important Message from Medicare  Important Message from Medicare regarding Discharge Appeal Rights: Given to patient/caregiver, Explained to patient/caregiver, Signed/date by patient/caregiver     Date IMM was signed: 06/14/22  Time IMM was signed: 0937

## 2022-06-15 NOTE — PLAN OF CARE
Problem: Adult Inpatient Plan of Care  Goal: Plan of Care Review  Outcome: Ongoing, Progressing  Goal: Patient-Specific Goal (Individualized)  Outcome: Ongoing, Progressing  Goal: Absence of Hospital-Acquired Illness or Injury  Outcome: Ongoing, Progressing  Goal: Optimal Comfort and Wellbeing  Outcome: Ongoing, Progressing  Goal: Readiness for Transition of Care  Outcome: Ongoing, Progressing     Problem: Skin Injury Risk Increased  Goal: Skin Health and Integrity  Outcome: Ongoing, Progressing     Problem: Adjustment to Illness (Gastrointestinal Bleeding)  Goal: Optimal Coping with Acute Illness  Outcome: Ongoing, Progressing     Problem: Bleeding (Gastrointestinal Bleeding)  Goal: Hemostasis  Outcome: Ongoing, Progressing

## 2022-06-16 LAB
ALBUMIN SERPL BCP-MCNC: 2.6 G/DL (ref 3.5–5.2)
ALP SERPL-CCNC: 70 U/L (ref 55–135)
ALT SERPL W/O P-5'-P-CCNC: 5 U/L (ref 10–44)
ANION GAP SERPL CALC-SCNC: 10 MMOL/L (ref 8–16)
AST SERPL-CCNC: 11 U/L (ref 10–40)
BASOPHILS # BLD AUTO: 0.04 K/UL (ref 0–0.2)
BASOPHILS NFR BLD: 0.5 % (ref 0–1.9)
BILIRUB SERPL-MCNC: 0.8 MG/DL (ref 0.1–1)
BUN SERPL-MCNC: 15 MG/DL (ref 8–23)
CALCIUM SERPL-MCNC: 8.8 MG/DL (ref 8.7–10.5)
CHLORIDE SERPL-SCNC: 107 MMOL/L (ref 95–110)
CO2 SERPL-SCNC: 22 MMOL/L (ref 23–29)
CREAT SERPL-MCNC: 1.2 MG/DL (ref 0.5–1.4)
DIFFERENTIAL METHOD: ABNORMAL
EOSINOPHIL # BLD AUTO: 0.2 K/UL (ref 0–0.5)
EOSINOPHIL NFR BLD: 1.8 % (ref 0–8)
ERYTHROCYTE [DISTWIDTH] IN BLOOD BY AUTOMATED COUNT: 20.4 % (ref 11.5–14.5)
EST. GFR  (AFRICAN AMERICAN): >60 ML/MIN/1.73 M^2
EST. GFR  (NON AFRICAN AMERICAN): 58.4 ML/MIN/1.73 M^2
GLUCOSE SERPL-MCNC: 113 MG/DL (ref 70–110)
HCT VFR BLD AUTO: 27.9 % (ref 40–54)
HGB BLD-MCNC: 7.9 G/DL (ref 14–18)
IMM GRANULOCYTES # BLD AUTO: 0.05 K/UL (ref 0–0.04)
IMM GRANULOCYTES NFR BLD AUTO: 0.6 % (ref 0–0.5)
LYMPHOCYTES # BLD AUTO: 1.5 K/UL (ref 1–4.8)
LYMPHOCYTES NFR BLD: 18.6 % (ref 18–48)
MAGNESIUM SERPL-MCNC: 1.9 MG/DL (ref 1.6–2.6)
MCH RBC QN AUTO: 21.2 PG (ref 27–31)
MCHC RBC AUTO-ENTMCNC: 28.3 G/DL (ref 32–36)
MCV RBC AUTO: 75 FL (ref 82–98)
MONOCYTES # BLD AUTO: 0.8 K/UL (ref 0.3–1)
MONOCYTES NFR BLD: 9.4 % (ref 4–15)
NEUTROPHILS # BLD AUTO: 5.7 K/UL (ref 1.8–7.7)
NEUTROPHILS NFR BLD: 69.1 % (ref 38–73)
NRBC BLD-RTO: 0 /100 WBC
PHOSPHATE SERPL-MCNC: 3.2 MG/DL (ref 2.7–4.5)
PLATELET # BLD AUTO: 208 K/UL (ref 150–450)
PMV BLD AUTO: 8.8 FL (ref 9.2–12.9)
POTASSIUM SERPL-SCNC: 4 MMOL/L (ref 3.5–5.1)
PROT SERPL-MCNC: 7.2 G/DL (ref 6–8.4)
RBC # BLD AUTO: 3.72 M/UL (ref 4.6–6.2)
SODIUM SERPL-SCNC: 139 MMOL/L (ref 136–145)
WBC # BLD AUTO: 8.3 K/UL (ref 3.9–12.7)

## 2022-06-16 PROCEDURE — 99232 PR SUBSEQUENT HOSPITAL CARE,LEVL II: ICD-10-PCS | Mod: GC,,, | Performed by: HOSPITALIST

## 2022-06-16 PROCEDURE — 84100 ASSAY OF PHOSPHORUS: CPT | Performed by: STUDENT IN AN ORGANIZED HEALTH CARE EDUCATION/TRAINING PROGRAM

## 2022-06-16 PROCEDURE — 11000001 HC ACUTE MED/SURG PRIVATE ROOM

## 2022-06-16 PROCEDURE — 99232 SBSQ HOSP IP/OBS MODERATE 35: CPT | Mod: GC,,, | Performed by: HOSPITALIST

## 2022-06-16 PROCEDURE — 25000003 PHARM REV CODE 250: Performed by: PHYSICIAN ASSISTANT

## 2022-06-16 PROCEDURE — 25000003 PHARM REV CODE 250: Performed by: STUDENT IN AN ORGANIZED HEALTH CARE EDUCATION/TRAINING PROGRAM

## 2022-06-16 PROCEDURE — 85025 COMPLETE CBC W/AUTO DIFF WBC: CPT | Performed by: STUDENT IN AN ORGANIZED HEALTH CARE EDUCATION/TRAINING PROGRAM

## 2022-06-16 PROCEDURE — 80053 COMPREHEN METABOLIC PANEL: CPT | Performed by: STUDENT IN AN ORGANIZED HEALTH CARE EDUCATION/TRAINING PROGRAM

## 2022-06-16 PROCEDURE — 83735 ASSAY OF MAGNESIUM: CPT | Performed by: STUDENT IN AN ORGANIZED HEALTH CARE EDUCATION/TRAINING PROGRAM

## 2022-06-16 PROCEDURE — 36415 COLL VENOUS BLD VENIPUNCTURE: CPT | Performed by: STUDENT IN AN ORGANIZED HEALTH CARE EDUCATION/TRAINING PROGRAM

## 2022-06-16 RX ADMIN — Medication 1000 UNITS: at 08:06

## 2022-06-16 RX ADMIN — ATORVASTATIN CALCIUM 20 MG: 20 TABLET, FILM COATED ORAL at 08:06

## 2022-06-16 RX ADMIN — FERROUS SULFATE TAB 325 MG (65 MG ELEMENTAL FE) 1 EACH: 325 (65 FE) TAB at 08:06

## 2022-06-16 RX ADMIN — MICONAZOLE NITRATE 2 % TOPICAL POWDER: at 08:06

## 2022-06-16 RX ADMIN — Medication 6 MG: at 08:06

## 2022-06-16 NOTE — PROGRESS NOTES
[facility] Medicine  Progress Note    Patient Name: Gera Londono  MRN: [id number]  Patient Class: IP- Inpatient   Admission Date: 6/11/2022  Length of Stay: 5 days  Attending Physician: Charlie Hernandez MD  Primary Care Provider: Gopal Hobbs MD        Subjective:     Principal Problem:Acute blood loss anemia        HPI:  Mr. Londono is a 77 year-old male with HTN, CKD, anemia, and debility who was brought to the ED on 06/11 following an episode of dark/red-appearing stool. He has been in his general state of health until today when he noticed the abnormal bowel movement. Denies associated abdominal pain, constipation, diarrhea, chest pain, shortness of breath. He has not had previous similar episodes. He additionally has never had a colonoscopy or endoscopy before.     ED course: On arrival he was afebrile, normotensive, non-tachycardic, with oxygen saturations at goal on room air. Labs revealed a hemoglobin of 5.7. He was transfused 1U PRBC and admitted to hospital medicine for further monitoring and management of a GI bleed.       Overview/Hospital Course:  Mr. Londono was admitted to hospital medicine for further monitoring and management of a GI bleed. Gastroenterology was consulted- performed EGD/colonoscopy on 06/13 where a large colonic mass was noted and biopsied. Colorectal surgery was consulted for assistance- ordered staging imaging with plans of continuing workup/management in an outpatient setting.  CT chest w/o contrast showed no evidence of metastatic disease. CT lumbar spine showed no concerning lytic or blastic lesions to suggest metastatic disease. 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 Pending biopsy results and disposition.      Interval History: No acute events overnight, afebrile, hemodynamically stable. Still continues to have dark stools.       Review of Systems    Constitutional:  Negative for chills and fever.   HENT:  Negative for congestion and rhinorrhea.    Eyes:  Negative for discharge and visual disturbance.   Respiratory:  Negative for cough and shortness of breath.    Cardiovascular:  Negative for chest pain and leg swelling.   Gastrointestinal:  Positive for blood in stool. Negative for abdominal pain, constipation, diarrhea, nausea, rectal pain and vomiting.   Genitourinary:  Negative for difficulty urinating and dysuria.   Musculoskeletal:  Positive for gait problem. Negative for arthralgias and back pain.   Skin:  Negative for pallor and rash.   Neurological:  Negative for dizziness and headaches.   Psychiatric/Behavioral:  Negative for sleep disturbance.    Objective:     Vital Signs (Most Recent):  Temp: 98.2 °F (36.8 °C) (06/16/22 0753)  Pulse: 83 (06/16/22 0753)  Resp: 20 (06/16/22 0753)  BP: 116/62 (06/16/22 0753)  SpO2: 98 % (06/16/22 0753) Vital Signs (24h Range):  Temp:  [98 °F (36.7 °C)-98.7 °F (37.1 °C)] 98.2 °F (36.8 °C)  Pulse:  [] 83  Resp:  [17-20] 20  SpO2:  [98 %-100 %] 98 %  BP: (116-144)/(62-79) 116/62     Weight: 84.4 kg (186 lb)  Body mass index is 23.88 kg/m².  No intake or output data in the 24 hours ending 06/16/22 0844   Physical Exam  Vitals and nursing note reviewed.   Constitutional:       General: He is not in acute distress.     Appearance: Normal appearance. He is normal weight. He is not ill-appearing, toxic-appearing or diaphoretic.   HENT:      Head: Normocephalic and atraumatic.   Eyes:      General: No scleral icterus.        Right eye: No discharge.         Left eye: No discharge.   Cardiovascular:      Rate and Rhythm: Normal rate and regular rhythm.      Pulses: Normal pulses.      Heart sounds: Normal heart sounds.   Pulmonary:      Effort: Pulmonary effort is normal. No respiratory distress.   Abdominal:      General: Abdomen is flat. Bowel sounds are normal. There is no distension.      Palpations: Abdomen is soft.       Tenderness: There is no abdominal tenderness. There is no guarding.   Musculoskeletal:      Cervical back: No rigidity.      Right lower leg: No edema.      Left lower leg: No edema.   Skin:     General: Skin is warm and dry.      Capillary Refill: Capillary refill takes less than 2 seconds.      Coloration: Skin is not jaundiced.   Neurological:      Mental Status: He is alert and oriented to person, place, and time. Mental status is at baseline.   Psychiatric:         Mood and Affect: Mood normal.         Behavior: Behavior normal.   Significant Labs: All pertinent labs within the past 24 hours have been reviewed.  CBC:   Recent Labs   Lab 06/15/22  0419 06/16/22  0312   WBC 9.60 8.30   HGB 8.4* 7.9*   HCT 29.8* 27.9*    208     CMP:   Recent Labs   Lab 06/15/22  0419 06/16/22  0312   * 139   K 4.3 4.0    107   CO2 23 22*   * 113*   BUN 14 15   CREATININE 1.2 1.2   CALCIUM 8.8 8.8   PROT 7.3 7.2   ALBUMIN 2.9* 2.6*   BILITOT 0.8 0.8   ALKPHOS 75 70   AST 12 11   ALT 5* 5*   ANIONGAP 10 10   EGFRNONAA 58.4* 58.4*     Magnesium:   Recent Labs   Lab 06/15/22  0419 06/16/22  0312   MG 2.0 1.9     Phosphorous:  Recent Labs   Lab 06/15/22  0419 06/16/22  0312   PHOS 2.4* 3.2         Significant Imaging: I have reviewed all pertinent imaging results/findings within the past 24 hours.    Inpatient Medications:  Continuous Infusions:  Scheduled Meds:   atorvastatin  20 mg Oral Daily    ferrous sulfate  1 tablet Oral Daily    miconazole NITRATE 2 %   Topical (Top) BID    vitamin D  1,000 Units Oral Daily     PRN Meds:sodium chloride, sodium chloride, melatonin, ondansetron, sodium chloride 0.9%        Assessment/Plan:      * Acute blood loss anemia  Mr. Londono is a 77 year-old male with HTN, CKD, anemia, and debility presenting from home after an episode of dark red stools. Hemoglobin on arrival was 5.7 now s/p 1U PRBC. Denies associated abdominal pain, nausea, vomiting, diarrhea. No hx  of liver disease, no previous GI bleeds or colonoscopies. Denies family history of colon cancer or other malignancies. Admitted for management of GI bleed. GI consulted.    -Underwent colonoscopy 06/13 with a large colonic mass present, which was biopsied, but was suspicious for malignancy.  -CRS was consulted and ordered additional staging workup with plans of continuing outpatient follow-up/mgmt.   -CT chest w/o contrast showed no evidence of metastatic disease. CT lumbar spine showed no concerning lytic or blastic lesions to suggest metastatic disease. 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    PLAN:  -Regular diet  -Cbc daily/transfuse as needed  -Outpatient followup [facility] CRS    Essential (primary) hypertension  --hold anti-hypertensives as he has been normotensive  --resume when clinically indicated      Age-related physical debility  Per Social Work assessment Patient currently has a HH RN through [facility]. He is unable to ambulate independently and is bedridden. Able to self feed but requires assistance for ADLs.No reports of elder abuse or neglect.      PLAN  --Social work consulted, following for possibility of increased HH and caregiving support if available as patient does not prefer nursing home placement. If unable, plan to discuss NH placement with family/POA.  --PT/OT consulted- recommended home health     CKD (chronic kidney disease) stage 3, GFR 30-59 ml/min  --creatinine/GFR at baseline  --avoid nephrotoxins, renally-dose medications    Vitamin D deficiency  --vitamin D supplement qd    Hyperlipidemia  --resume home statin      VTE Risk Mitigation (From admission, onward)         Ordered     Place sequential compression device  Until discontinued         06/11/22 8991     Reason for No Pharmacological VTE Prophylaxis  Once        Question:  Reasons:  Answer:  Active Bleeding  Comment:  GI bleed     06/11/22 1631     IP VTE HIGH RISK PATIENT  Once         06/11/22 1631                Discharge Planning   PARI: 6/16/2022     Code Status: Full Code   Is the patient medically ready for discharge?: Yes    Reason for patient still in hospital (select all that apply): Pending disposition  Discharge Plan A: Home with family, Home Health, Community Services   Discharge Delays: None known at this time              Denver Worthy DO  Department of Hospital Medicine   [facility]

## 2022-06-16 NOTE — CARE UPDATE
Called the patient's niece, Kelsey, and discussed the plan of care. Reviewed imaging findings and plans going forward (following path and CRS + oncology follow-ups). She verbalized understanding. Additionally mentioned family would be ready to receive him tomorrow at any point throughout the day as long as they're informed prior to discharge. Will need transportation.     Shelbie Duran MD  Internal Medicine PGY-III

## 2022-06-16 NOTE — PLAN OF CARE
Patient awake and alert with intermittent confusion. Dark black stools noted during shift. Patient turned q2h with wedge. Bed in lowest position. Side rails up x 3. Call light within reach.     Problem: Adult Inpatient Plan of Care  Goal: Plan of Care Review  Outcome: Ongoing, Progressing  Goal: Patient-Specific Goal (Individualized)  Outcome: Ongoing, Progressing  Goal: Absence of Hospital-Acquired Illness or Injury  Outcome: Ongoing, Progressing  Goal: Optimal Comfort and Wellbeing  Outcome: Ongoing, Progressing  Goal: Readiness for Transition of Care  Outcome: Ongoing, Progressing     Problem: Bleeding (Gastrointestinal Bleeding)  Goal: Hemostasis  Outcome: Ongoing, Progressing

## 2022-06-16 NOTE — PLAN OF CARE
Patient in bed awake and alert, no signs of distress noted, patient had no dark stools during shift, will continue to monitor.    Problem: Adult Inpatient Plan of Care  Goal: Plan of Care Review  Outcome: Ongoing, Progressing  Flowsheets (Taken 6/16/2022 1847)  Plan of Care Reviewed With: patient  Goal: Optimal Comfort and Wellbeing  Outcome: Ongoing, Progressing  Intervention: Monitor Pain and Promote Comfort  Flowsheets (Taken 6/16/2022 1847)  Pain Management Interventions:   position adjusted   warm blanket provided

## 2022-06-16 NOTE — SUBJECTIVE & OBJECTIVE
Interval History: No acute events overnight, afebrile, hemodynamically stable. Still continues to have dark stools.       Review of Systems   Constitutional:  Negative for chills and fever.   HENT:  Negative for congestion and rhinorrhea.    Eyes:  Negative for discharge and visual disturbance.   Respiratory:  Negative for cough and shortness of breath.    Cardiovascular:  Negative for chest pain and leg swelling.   Gastrointestinal:  Positive for blood in stool. Negative for abdominal pain, constipation, diarrhea, nausea, rectal pain and vomiting.   Genitourinary:  Negative for difficulty urinating and dysuria.   Musculoskeletal:  Positive for gait problem. Negative for arthralgias and back pain.   Skin:  Negative for pallor and rash.   Neurological:  Negative for dizziness and headaches.   Psychiatric/Behavioral:  Negative for sleep disturbance.    Objective:     Vital Signs (Most Recent):  Temp: 98.2 °F (36.8 °C) (06/16/22 0753)  Pulse: 83 (06/16/22 0753)  Resp: 20 (06/16/22 0753)  BP: 116/62 (06/16/22 0753)  SpO2: 98 % (06/16/22 0753) Vital Signs (24h Range):  Temp:  [98 °F (36.7 °C)-98.7 °F (37.1 °C)] 98.2 °F (36.8 °C)  Pulse:  [] 83  Resp:  [17-20] 20  SpO2:  [98 %-100 %] 98 %  BP: (116-144)/(62-79) 116/62     Weight: 84.4 kg (186 lb)  Body mass index is 23.88 kg/m².  No intake or output data in the 24 hours ending 06/16/22 0844   Physical Exam  Vitals and nursing note reviewed.   Constitutional:       General: He is not in acute distress.     Appearance: Normal appearance. He is normal weight. He is not ill-appearing, toxic-appearing or diaphoretic.   HENT:      Head: Normocephalic and atraumatic.   Eyes:      General: No scleral icterus.        Right eye: No discharge.         Left eye: No discharge.   Cardiovascular:      Rate and Rhythm: Normal rate and regular rhythm.      Pulses: Normal pulses.      Heart sounds: Normal heart sounds.   Pulmonary:      Effort: Pulmonary effort is normal. No  respiratory distress.   Abdominal:      General: Abdomen is flat. Bowel sounds are normal. There is no distension.      Palpations: Abdomen is soft.      Tenderness: There is no abdominal tenderness. There is no guarding.   Musculoskeletal:      Cervical back: No rigidity.      Right lower leg: No edema.      Left lower leg: No edema.   Skin:     General: Skin is warm and dry.      Capillary Refill: Capillary refill takes less than 2 seconds.      Coloration: Skin is not jaundiced.   Neurological:      Mental Status: He is alert and oriented to person, place, and time. Mental status is at baseline.   Psychiatric:         Mood and Affect: Mood normal.         Behavior: Behavior normal.   Significant Labs: All pertinent labs within the past 24 hours have been reviewed.  CBC:   Recent Labs   Lab 06/15/22  0419 06/16/22  0312   WBC 9.60 8.30   HGB 8.4* 7.9*   HCT 29.8* 27.9*    208     CMP:   Recent Labs   Lab 06/15/22  0419 06/16/22  0312   * 139   K 4.3 4.0    107   CO2 23 22*   * 113*   BUN 14 15   CREATININE 1.2 1.2   CALCIUM 8.8 8.8   PROT 7.3 7.2   ALBUMIN 2.9* 2.6*   BILITOT 0.8 0.8   ALKPHOS 75 70   AST 12 11   ALT 5* 5*   ANIONGAP 10 10   EGFRNONAA 58.4* 58.4*     Magnesium:   Recent Labs   Lab 06/15/22  0419 06/16/22  0312   MG 2.0 1.9     Phosphorous:  Recent Labs   Lab 06/15/22  0419 06/16/22  0312   PHOS 2.4* 3.2         Significant Imaging: I have reviewed all pertinent imaging results/findings within the past 24 hours.    Inpatient Medications:  Continuous Infusions:  Scheduled Meds:   atorvastatin  20 mg Oral Daily    ferrous sulfate  1 tablet Oral Daily    miconazole NITRATE 2 %   Topical (Top) BID    vitamin D  1,000 Units Oral Daily     PRN Meds:sodium chloride, sodium chloride, melatonin, ondansetron, sodium chloride 0.9%

## 2022-06-16 NOTE — ASSESSMENT & PLAN NOTE
Per Social Work assessment Patient currently has a HH RN through [facility] . He is unable to ambulate independently and is bedridden. Able to self feed but requires assistance for ADLs.No reports of elder abuse or neglect.      PLAN  --Social work consulted, following for possibility of increased HH and caregiving support if available as patient does not prefer nursing home placement. If unable, plan to discuss NH placement with family/POA.  --PT/OT consulted- recommended home health

## 2022-06-16 NOTE — PLAN OF CARE
Patient in bed awake and alert, no signs of distress noted, tolerate scheduled meds well by mouth, no bleeding in stool noted during shift, will continue to monitor.    Problem: Adult Inpatient Plan of Care  Goal: Plan of Care Review  Outcome: Ongoing, Progressing  Flowsheets (Taken 6/15/2022 1900)  Plan of Care Reviewed With: patient  Goal: Optimal Comfort and Wellbeing  Outcome: Ongoing, Progressing  Intervention: Monitor Pain and Promote Comfort  Flowsheets (Taken 6/15/2022 1900)  Pain Management Interventions:   pillow support provided   medication offered   position adjusted

## 2022-06-17 VITALS
WEIGHT: 186 LBS | HEART RATE: 86 BPM | RESPIRATION RATE: 18 BRPM | TEMPERATURE: 97 F | DIASTOLIC BLOOD PRESSURE: 81 MMHG | SYSTOLIC BLOOD PRESSURE: 167 MMHG | BODY MASS INDEX: 23.87 KG/M2 | HEIGHT: 74 IN | OXYGEN SATURATION: 100 %

## 2022-06-17 PROCEDURE — 1111F PR DISCHARGE MEDS RECONCILED W/ CURRENT OUTPATIENT MED LIST: ICD-10-PCS | Mod: CPTII,GC,, | Performed by: HOSPITALIST

## 2022-06-17 PROCEDURE — 99239 PR HOSPITAL DISCHARGE DAY,>30 MIN: ICD-10-PCS | Mod: GC,,, | Performed by: HOSPITALIST

## 2022-06-17 PROCEDURE — 99239 HOSP IP/OBS DSCHRG MGMT >30: CPT | Mod: GC,,, | Performed by: HOSPITALIST

## 2022-06-17 PROCEDURE — 25000003 PHARM REV CODE 250: Performed by: STUDENT IN AN ORGANIZED HEALTH CARE EDUCATION/TRAINING PROGRAM

## 2022-06-17 PROCEDURE — 1111F DSCHRG MED/CURRENT MED MERGE: CPT | Mod: CPTII,GC,, | Performed by: HOSPITALIST

## 2022-06-17 RX ADMIN — MICONAZOLE NITRATE 2 % TOPICAL POWDER: at 09:06

## 2022-06-17 RX ADMIN — ATORVASTATIN CALCIUM 20 MG: 20 TABLET, FILM COATED ORAL at 09:06

## 2022-06-17 RX ADMIN — Medication 1000 UNITS: at 09:06

## 2022-06-17 RX ADMIN — FERROUS SULFATE TAB 325 MG (65 MG ELEMENTAL FE) 1 EACH: 325 (65 FE) TAB at 09:06

## 2022-06-17 NOTE — NURSING
0806 Pt in bed A&O x4 resp even and unlabored, vitals done, communication board updated at bedside report, bed in low locked position, call light in reach    0929 Pt in bed finishing breakfast, resp even and unlabored, no s/s distress/ discomfort, scheduled meds given     1330 Pericare provided for urine and small BM, dark tarry stool , niece at bedside, doctor had been trying to reach unable to reach her earlier, messaged doctor to notify that she is in patients room, discharge today after doctor okay    3223 awaiting doctor to see patient

## 2022-06-17 NOTE — ASSESSMENT & PLAN NOTE
--Held anti-hypertensives as he has been normotensive  --Followup outpatient with PCP for resumption

## 2022-06-17 NOTE — PLAN OF CARE
[facility]      HOME HEALTH ORDERS  FACE TO FACE ENCOUNTER    Patient Name: Gera Londono  YOB: 1945    PCP: Gopal Hobbs MD   [id number]  PCP Phone Number: [phone]  PCP Fax: [phone]    Encounter Date: 6/11/22    Admit to Home Health    Diagnoses:  Active Hospital Problems    Diagnosis  POA    *Acute blood loss anemia [D62]  Yes    Rectal mass [K62.89]  Yes    Microcytic anemia [D50.9]  Yes    Osteoarthritis [M19.90]  Yes     Chronic    CKD (chronic kidney disease) stage 3, GFR 30-59 ml/min [N18.30]  Yes     Chronic    Age-related physical debility [R54]  Yes     Chronic    Hyperlipidemia [E78.5]  Yes     Chronic    Vitamin D deficiency [E55.9]  Yes     Chronic    Essential (primary) hypertension [I10]  Yes     Chronic      Resolved Hospital Problems   No resolved problems to display.       Follow Up Appointments:  Future Appointments   Date Time Provider Department Center   6/24/2022 10:20 AM Héctor Sage MD [facility]   7/5/2022 10:00 AM [email or web address] VACCINE, PRIMARY CARE [facility] WELL[facility]   7/7/2022  8:00 AM Ciro Saldivar NP [address]       Allergies:Review of patient's allergies indicates:  No Known Allergies    Medications: Review discharge medications with patient and family and provide education.    Current Facility-Administered Medications   Medication Dose Route Frequency Provider Last Rate Last Admin    0.9%  NaCl infusion (for blood administration)   Intravenous Q24H PRN ANDRE Melchor-C        0.9%  NaCl infusion (for blood administration)   Intravenous Q24H PRN Yasmany Linn MD        atorvastatin tablet 20 mg  20 mg Oral Daily Denver R Deacon, DO   20 mg at 06/16/22 0835    ferrous sulfate tablet 1 each  1 tablet Oral Daily Denver ROCK CorbettDeacon, DO   1 each at 06/16/22 0835    melatonin tablet 6 mg  6 mg Oral Nightly PRN Lyssa Alvarez PA-C   6 mg at 06/16/22 2050     miconazole NITRATE 2 % top powder   Topical (Top) BID Lyssa Alvarez PA-C   Given at 06/16/22 0836    ondansetron injection 4 mg  4 mg Intravenous Once PRN Yasmany Linn MD        sodium chloride 0.9% flush 10 mL  10 mL Intravenous PRN Lyssa Alvarez PA-C   10 mL at 06/11/22 1728    vitamin D 1000 units tablet 1,000 Units  1,000 Units Oral Daily Denver ROCK Worthy DO   1,000 Units at 06/16/22 0835     Current Discharge Medication List      CONTINUE these medications which have CHANGED    Details   ferrous sulfate (FEROSUL) 325 mg (65 mg iron) Tab tablet TAKE 1 TABLET (325 MG TOTAL) BY MOUTH 2 (TWO) TIMES DAILY. FOR ANEMIA  Qty: 180 tablet, Refills: 3    Associated Diagnoses: Iron deficiency anemia secondary to inadequate dietary iron intake      losartan (COZAAR) 25 MG tablet Take 1 tablet (25 mg total) by mouth once daily. HOLD UNTIL SEEN BY YOUR PRIMARY CARE DOCTOR  Qty: 90 tablet, Refills: 3    Associated Diagnoses: Essential (primary) hypertension         CONTINUE these medications which have NOT CHANGED    Details   acetaminophen (TYLENOL) 500 MG tablet Take 500 mg by mouth 2 (two) times a day.      atorvastatin (LIPITOR) 20 MG tablet TAKE 1 TABLET EVERY DAY  Qty: 90 tablet, Refills: 3    Associated Diagnoses: Other hyperlipidemia      cetirizine (ZYRTEC) 10 MG tablet Take 10 mg by mouth daily as needed for Allergies.      ergocalciferol (ERGOCALCIFEROL) 50,000 unit Cap TAKE 1 CAPSULE EVERY 7 DAYS  Qty: 12 capsule, Refills: 1    Associated Diagnoses: Vitamin D deficiency; Annual physical exam; Hypercalcemia      walker Misc 1 each by Misc.(Non-Drug; Combo Route) route once daily.  Qty: 1 each, Refills: 0    Comments: Neil Avila  Associated Diagnoses: Age-related physical debility; Osteoarthritis, unspecified osteoarthritis type, unspecified site; Decreased mobility               I have seen and examined this patient within the last 30 days. My clinical findings that support the need for  the home health skilled services and home bound status are the following:no   Weakness/numbness causing balance and gait disturbance due to Weakness/Debility, Anemia and Malignancy/Cancer making it taxing to leave home.  Requiring assistive device to leave home due to unsteady gait caused by  Weakness/Debility, Anemia and Malignancy/Cancer.     Diet:   regular diet    Referrals/ Consults  Physical Therapy to evaluate and treat. Evaluate for home safety and equipment needs; Establish/upgrade home exercise program. Perform / instruct on therapeutic exercises, gait training, transfer training, and Range of Motion.  Occupational Therapy to evaluate and treat. Evaluate home environment for safety and equipment needs. Perform/Instruct on transfers, ADL training, ROM, and therapeutic exercises.    Activities:   activity as tolerated    Nursing:   Agency to admit patient within 24 hours of hospital discharge unless specified on physician order or at patient request    SN to complete comprehensive assessment including routine vital signs. Instruct on disease process and s/s of complications to report to MD. Review/verify medication list sent home with the patient at time of discharge  and instruct patient/caregiver as needed. Frequency may be adjusted depending on start of care date.     Skilled nurse to perform up to 3 visits PRN for symptoms related to diagnosis    Ok to schedule additional visits based on staff availability and patient request on consecutive days within the home health episode.    When multiple disciplines ordered:    Start of Care occurs on Sunday - Wednesday schedule remaining discipline evaluations as ordered on separate consecutive days following the start of care.    Thursday SOC -schedule subsequent evaluations Friday and Monday the following week.     Friday - Saturday SOC - schedule subsequent discipline evaluations on consecutive days starting Monday of the following week.    For all  post-discharge communication and subsequent orders please contact patient's primary care physician. If unable to reach primary care physician or do not receive response within 30 minutes, please contact [facility] for clinical staff order clarification    Home Health Aide:  Nursing Twice weekly, Physical Therapy Three times weekly, Occupational Therapy Three times weekly and Home Health Aide Three times weekly    I certify that this patient is confined to his home and needs intermittent skilled nursing care, physical therapy and occupational therapy.

## 2022-06-17 NOTE — PLAN OF CARE
"Sw spoke with beth Cole, informed number of [facility] for transport needs at home, also informed of medicaid transport if Pt able to sit up although niece states "pt has no sat up in year." Pt to transport to brother's home [facility] , Sw will follow with address and setup once doctor speaks with family, nurse updated that Pt is good to go once doctor speaks with family.   "

## 2022-06-17 NOTE — PT/OT/SLP PROGRESS
Occupational Therapy      Patient Name:  Gera Londono   MRN:  [id number]    Patient not seen today secondary to  (patient refused in anticipation of discharge home.). Will follow-up 06/20/22.    6/17/2022

## 2022-06-17 NOTE — DISCHARGE SUMMARY
[facility] Medicine  Discharge Summary      Patient Name: Gera Londono  MRN: [id number]  Patient Class: IP- Inpatient  Admission Date: 6/11/2022  Hospital Length of Stay: 6 days  Discharge Date and Time:  06/17/2022 2:38 PM  Attending Physician: Charlie Hernandez MD   Discharging Provider: Denver Worthy DO  Primary Care Provider: Gopal Hobbs MD  [facility] Medicine Team: [facility] MED 3 Denver Worthy DO    HPI:   Mr. Londono is a 77 year-old male with HTN, CKD, anemia, and debility who was brought to the ED on 06/11 following an episode of dark/red-appearing stool. He has been in his general state of health until today when he noticed the abnormal bowel movement. Denies associated abdominal pain, constipation, diarrhea, chest pain, shortness of breath. He has not had previous similar episodes. He additionally has never had a colonoscopy or endoscopy before.     ED course: On arrival he was afebrile, normotensive, non-tachycardic, with oxygen saturations at goal on room air. Labs revealed a hemoglobin of 5.7. He was transfused 1U PRBC and admitted to hospital medicine for further monitoring and management of a GI bleed.       Procedure(s) (LRB):  EGD (ESOPHAGOGASTRODUODENOSCOPY) (N/A)  COLONOSCOPY (N/A)      Hospital Course:   Mr. Londono was admitted to hospital medicine for further monitoring and management of a GI bleed. Gastroenterology was consulted- performed EGD/colonoscopy on 06/13 where a large colonic mass was noted and biopsied. Colorectal surgery was consulted for assistance- ordered staging imaging with plans of continuing workup/management in an outpatient setting.  CT chest w/o contrast showed no evidence of metastatic disease. CT lumbar spine showed no concerning lytic or blastic lesions to suggest metastatic disease. 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 Invasive tumor with  "involvement of the mesorectal fascia, right seminal vesicles, levator ani musculature, and sphincter complex. Outpatient followup with Heme/Oncology and colorectal surgery appointment for further evaluation.        Goals of Care Treatment Preferences:  Code Status: Full Code      Consults:   Consults (From admission, onward)        Status Ordering Provider     Inpatient consult to Colorectal Surgery  Once        Provider:  (Not yet assigned)    Completed AMRITA COBB     Inpatient consult to Social Work  Once        Provider:  (Not yet assigned)    Acknowledged MUKESH MORTENSEN     Inpatient consult to Gastroenterology  Once        Provider:  (Not yet assigned)    Completed RAJ CRUZ          * Rectal mass  See "Acute blood loss anemia" A&P        Acute blood loss anemia  Mr. Londono is a 77 year-old male with HTN, CKD, anemia, and debility presenting from home after an episode of dark red stools. Hemoglobin on arrival was 5.7 now s/p 1U PRBC. Denies associated abdominal pain, nausea, vomiting, diarrhea. No hx of liver disease, no previous GI bleeds or colonoscopies. Denies family history of colon cancer or other malignancies. Admitted for management of GI bleed. GI consulted.    -Underwent colonoscopy 06/13 with a large colonic mass present, which was biopsied, but was suspicious for malignancy.  -CRS was consulted and ordered additional staging workup with plans of continuing outpatient follow-up/mgmt.   -CT chest w/o contrast showed no evidence of metastatic disease. CT lumbar spine showed no concerning lytic or blastic lesions to suggest metastatic disease. 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    PLAN:  -Regular diet  -Cbc daily/transfuse as needed  -Outpatient followup with CRS and Heme/Onc    Essential (primary) hypertension  --Held anti-hypertensives as he has been normotensive  --Followup " outpatient with PCP for resumption      Age-related physical debility  Per Social Work assessment Patient currently has a HH RN through [facility] . He is unable to ambulate independently and is bedridden. Able to self feed but requires assistance for ADLs.No reports of elder abuse or neglect.      PLAN  --PT/OT consulted- recommended home health     Final Active Diagnoses:    Diagnosis Date Noted POA    PRINCIPAL PROBLEM:  Rectal mass [K62.89] 06/13/2022 Yes    Acute blood loss anemia [D62] 06/11/2022 Yes    Essential (primary) hypertension [I10] 03/19/2019 Yes     Chronic    CKD (chronic kidney disease) stage 3, GFR 30-59 ml/min [N18.30] 02/18/2020 Yes     Chronic    Age-related physical debility [R54] 02/18/2020 Yes     Chronic    Hyperlipidemia [E78.5] 03/21/2019 Yes     Chronic    Vitamin D deficiency [E55.9] 03/21/2019 Yes     Chronic    Microcytic anemia [D50.9] 06/11/2022 Yes    Osteoarthritis [M19.90] 05/13/2020 Yes     Chronic      Problems Resolved During this Admission:       Discharged Condition: fair    Disposition: [place]-Health Care [facility] [place]    Follow Up:   Follow-up Information     [facility] Follow up in 1 day(s).    Specialties: DME Provider, Home Health Services  Contact information:  [address] [address] [phone]             Gopal Hobbs MD Follow up in 1 week(s).    Specialty: Family Medicine  Contact information:  [address] [address] [phone]                       Patient Instructions:      Ambulatory referral/consult to Hematology / Oncology   Standing Status: Future   Referral Priority: Routine Referral Type: Consultation   Referral Reason: Specialty Services Required   Requested Specialty: Hematology and Oncology   Number of Visits Requested: 1     Ambulatory referral/consult to Colorectal Surgery   Standing Status: Future   Referral Priority: Routine Referral Type: Consultation   Referral Reason: Specialty Services  Required   Requested Specialty: Colon and Rectal Surgery   Number of Visits Requested: 1     Notify your health care provider if you experience any of the following:  temperature >100.4     Notify your health care provider if you experience any of the following:  persistent nausea and vomiting or diarrhea     Notify your health care provider if you experience any of the following:  severe uncontrolled pain     Notify your health care provider if you experience any of the following:  redness, tenderness, or signs of infection (pain, swelling, redness, odor or green/yellow discharge around incision site)     Notify your health care provider if you experience any of the following:  difficulty breathing or increased cough     Notify your health care provider if you experience any of the following:  severe persistent headache     Notify your health care provider if you experience any of the following:  worsening rash     Notify your health care provider if you experience any of the following:  persistent dizziness, light-headedness, or visual disturbances     Notify your health care provider if you experience any of the following:  increased confusion or weakness     Activity as tolerated     COVID-19 PFIZER 2ND DOSAGE APPT REQUEST   Standing Status: Future Standing Exp. Date: 06/11/23     Order Specific Question Answer Comments   Schedule the second dosage on this date: 7/2/2022        Significant Diagnostic Studies: Labs:   CMP   Recent Labs   Lab 06/16/22 0312      K 4.0      CO2 22*   *   BUN 15   CREATININE 1.2   CALCIUM 8.8   PROT 7.2   ALBUMIN 2.6*   BILITOT 0.8   ALKPHOS 70   AST 11   ALT 5*   ANIONGAP 10   ESTGFRAFRICA >60.0   EGFRNONAA 58.4*    and CBC   Recent Labs   Lab 06/16/22 0312   WBC 8.30   HGB 7.9*   HCT 27.9*          Pending Diagnostic Studies:     Procedure Component Value Units Date/Time    CT Abdomen Pelvis With Contrast [[id number]]     Order Status: Sent Lab Status: No  result     Specimen to Pathology, Surgery Gastrointestinal tract [[id number]] Collected: 06/13/22 1112    Order Status: Sent Lab Status: In process Updated: 06/13/22 1445    Specimen: Tissue          Medications:  Reconciled Home Medications:      Medication List      CHANGE how you take these medications    ergocalciferol 50,000 unit Cap  Commonly known as: ERGOCALCIFEROL  TAKE 1 CAPSULE EVERY 7 DAYS  What changed: See the new instructions.     losartan 25 MG tablet  Commonly known as: COZAAR  Take 1 tablet (25 mg total) by mouth once daily. HOLD UNTIL SEEN BY YOUR PRIMARY CARE DOCTOR  What changed: See the new instructions.        CONTINUE taking these medications    acetaminophen 500 MG tablet  Commonly known as: TYLENOL  Take 500 mg by mouth 2 (two) times a day.     atorvastatin 20 MG tablet  Commonly known as: LIPITOR  TAKE 1 TABLET EVERY DAY     cetirizine 10 MG tablet  Commonly known as: ZYRTEC  Take 10 mg by mouth daily as needed for Allergies.     FeroSuL 325 mg (65 mg iron) Tab tablet  Generic drug: ferrous sulfate  TAKE 1 TABLET (325 MG TOTAL) BY MOUTH 2 (TWO) TIMES DAILY. FOR ANEMIA     walker Misc  1 each by Misc.(Non-Drug; Combo Route) route once daily.            Indwelling Lines/Drains at time of discharge:   Lines/Drains/Airways     None                 Time spent on the discharge of patient: 35 minutes         Denver Worthy DO  Department of Hospital Medicine  [facility] Surg

## 2022-06-17 NOTE — PLAN OF CARE
[facility]  Discharge Final Note    Primary Care Provider: Gopal Hobbs MD    Expected Discharge Date: 6/17/2022     Future Appointments   Date Time Provider Department Center   6/24/2022 10:20 AM Héctor Sage MD [facility] COLON [facility]   7/5/2022 10:00 AM PFIZER VACCINE, PRIMARY CARE AND WELLNESS [facility] IM [facility]   7/7/2022  8:00 AM Ciro Saldivar NP [address]       Final Discharge Note (most recent)     Final Note - 06/17/22 1000        Final Note    Assessment Type Final Discharge Note     Anticipated Discharge Disposition Home-Health Care Svc     What phone number can be called within the next 1-3 days to see how you are doing after discharge? [phone]     Hospital Resources/Appts/Education Provided Appointments scheduled and added to AVS;Post-Acute resouces added to AVS        Post-Acute Status    Post-Acute Authorization Home Health     Home Health Status Set-up Complete/Auth obtained     Discharge Delays None known at this time                 Important Message from Medicare  Important Message from Medicare regarding Discharge Appeal Rights: Given to patient/caregiver, Explained to patient/caregiver, Signed/date by patient/caregiver     Date IMM was signed: 06/14/22  Time IMM was signed: 0937

## 2022-06-17 NOTE — PLAN OF CARE
Pt discharging home to family with HH  Problem: Adult Inpatient Plan of Care  Goal: Plan of Care Review  Outcome: Ongoing, Progressing  Goal: Patient-Specific Goal (Individualized)  Outcome: Ongoing, Progressing  Goal: Absence of Hospital-Acquired Illness or Injury  Outcome: Ongoing, Progressing  Goal: Optimal Comfort and Wellbeing  Outcome: Ongoing, Progressing  Goal: Readiness for Transition of Care  Outcome: Ongoing, Progressing     Problem: Skin Injury Risk Increased  Goal: Skin Health and Integrity  Outcome: Ongoing, Progressing     Problem: Adjustment to Illness (Gastrointestinal Bleeding)  Goal: Optimal Coping with Acute Illness  Outcome: Ongoing, Progressing     Problem: Bleeding (Gastrointestinal Bleeding)  Goal: Hemostasis  Outcome: Ongoing, Progressing

## 2022-06-17 NOTE — ASSESSMENT & PLAN NOTE
Per Social Work assessment Patient currently has a HH RN through [facility]. He is unable to ambulate independently and is bedridden. Able to self feed but requires assistance for ADLs.No reports of elder abuse or neglect.      PLAN  --PT/OT consulted- recommended home health

## 2022-06-17 NOTE — ASSESSMENT & PLAN NOTE
Mr. Londono is a 77 year-old male with HTN, CKD, anemia, and debility presenting from home after an episode of dark red stools. Hemoglobin on arrival was 5.7 now s/p 1U PRBC. Denies associated abdominal pain, nausea, vomiting, diarrhea. No hx of liver disease, no previous GI bleeds or colonoscopies. Denies family history of colon cancer or other malignancies. Admitted for management of GI bleed. GI consulted.    -Underwent colonoscopy 06/13 with a large colonic mass present, which was biopsied, but was suspicious for malignancy.  -CRS was consulted and ordered additional staging workup with plans of continuing outpatient follow-up/mgmt.   -CT chest w/o contrast showed no evidence of metastatic disease. CT lumbar spine showed no concerning lytic or blastic lesions to suggest metastatic disease. 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    PLAN:  -Regular diet  -Cbc daily/transfuse as needed  -Outpatient followup with CRS and Heme/Onc

## 2022-06-17 NOTE — PT/OT/SLP PROGRESS
Physical Therapy      Patient Name:  Gera Londono   MRN:  [id number]    Patient not seen today secondary to pt refused PT due to pt states he is being D/Erik to home with HH and caregiver assist.   Beata Mock PT 6/17/22

## 2022-06-17 NOTE — DISCHARGE INSTRUCTIONS
-You were admitted to our hospital for treatment of rectal bleeding. Over the course of your hospitalization, our workup was concerning for a Large rectal tumor. We have scheduled followup with Hematology/Oncology and Colorectal Surgery who will evaluate it further. It is very important for you to followup with them.     -Followup with your primary care physician for updated labwork, medication adjustments and any routine post-discharge care.

## 2022-06-17 NOTE — PLAN OF CARE
Problem: Adult Inpatient Plan of Care  Goal: Plan of Care Review  Outcome: Ongoing, Progressing  Goal: Patient-Specific Goal (Individualized)  Outcome: Ongoing, Progressing  Goal: Absence of Hospital-Acquired Illness or Injury  Outcome: Ongoing, Progressing  Goal: Optimal Comfort and Wellbeing  Outcome: Ongoing, Progressing     Problem: Bleeding (Gastrointestinal Bleeding)  Goal: Hemostasis  Outcome: Ongoing, Progressing

## 2022-06-21 ENCOUNTER — PATIENT OUTREACH (OUTPATIENT)
Dept: ADMINISTRATIVE | Facility: HOSPITAL | Age: 77
End: 2022-06-21
Payer: MEDICARE

## 2022-06-21 LAB
COMMENT: NORMAL
FINAL PATHOLOGIC DIAGNOSIS: NORMAL
GROSS: NORMAL
Lab: NORMAL
MICROSCOPIC EXAM: NORMAL

## 2022-06-22 ENCOUNTER — DOCUMENTATION ONLY (OUTPATIENT)
Dept: SURGERY | Facility: CLINIC | Age: 77
End: 2022-06-22
Payer: MEDICARE

## 2022-06-22 NOTE — PROGRESS NOTES
[facility]  Colon, Rectal and Anal Malignancies  Multi-disciplinary Tumor Board     [address]  [place], [place]  Tel: [phone]  Fax: [phone]  [email or web address]     Patient name: Gera Londono   YOB: 1945   MRN: [id number]    Date of discussion: 06/22/2022    Thank you for referring Mr. Londono to our care here at [facility]. Please allow us to summarize our review of records and recommendations.    The following disciplines were present for the discussion:    Colon and Rectal Surgery   Medical Oncology   Radiation Oncology   Pathology   Radiology    Endoscopy reviewed:    Date performed: 6/13/2022   Yes, complete evaluation of colon and rectum performed    CT Chest   Date performed: 6/14/2022    Performed at our facility: Yes   Metastatic disease present: No    MRI Rectal Cancer Protocol   Date performed: 6/14/2022    Performed at our facility: Yes   Tumor location in the rectum: Lower (0-5 cm)   Sphincter involvement: Invades internal sphincter and/or external sphincter   Pretreatment circumferential resection margin status: Involved    Pathology reviewed:    Date pathology finalized: 6/21/2022   Yes, slides reviewed     Pre-treatment CEA:   Date performed: 6/13/2022   CEA Level: 3.9    Pre-treatment Clinical Stage:   T4b - Tumor invades or is adherent to adjacent organs or structures   N+ - Joaquin disease is suspected   M0 - No metastasis suspected    Based on our review of the current information we have made the following recommendations:  Summary: 77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 Sample insufficient for MMR testing. Pending CT abd/pelvis for full staging workup. Plan to flex in clinic with repeat biopsy and total neoadjuvant therapy with long course radiation.     Additional laboratory, imaging, or endoscopic studies   CT Abdomen and Pelvis   Flex sig with repeat biopsy    Therapies   Long-course  radiation with chemosensitization followed by chemotherapy with restaging    Clinical research study eligibility - No    Referrals   none    Please do not hesitate to contact us for any questions.

## 2022-06-24 ENCOUNTER — OFFICE VISIT (OUTPATIENT)
Dept: SURGERY | Facility: CLINIC | Age: 77
End: 2022-06-24
Payer: MEDICARE

## 2022-06-24 VITALS
DIASTOLIC BLOOD PRESSURE: 72 MMHG | HEART RATE: 86 BPM | SYSTOLIC BLOOD PRESSURE: 134 MMHG | BODY MASS INDEX: 23.88 KG/M2 | HEIGHT: 74 IN

## 2022-06-24 DIAGNOSIS — K92.2 GI BLEED: ICD-10-CM

## 2022-06-24 DIAGNOSIS — C20 RECTAL CANCER: Primary | ICD-10-CM

## 2022-06-24 PROCEDURE — 88342 IMHCHEM/IMCYTCHM 1ST ANTB: CPT | Mod: 26,,, | Performed by: PATHOLOGY

## 2022-06-24 PROCEDURE — 88341 PR IHC OR ICC EACH ADD'L SINGLE ANTIBODY  STAINPR: ICD-10-PCS | Mod: 26,,, | Performed by: PATHOLOGY

## 2022-06-24 PROCEDURE — 88342 IMHCHEM/IMCYTCHM 1ST ANTB: CPT | Performed by: PATHOLOGY

## 2022-06-24 PROCEDURE — 99999 PR PBB SHADOW E&M-EST. PATIENT-LVL III: CPT | Mod: PBBFAC,,, | Performed by: COLON & RECTAL SURGERY

## 2022-06-24 PROCEDURE — 88341 IMHCHEM/IMCYTCHM EA ADD ANTB: CPT | Mod: 26,,, | Performed by: PATHOLOGY

## 2022-06-24 PROCEDURE — 1126F AMNT PAIN NOTED NONE PRSNT: CPT | Mod: CPTII,S$GLB,, | Performed by: COLON & RECTAL SURGERY

## 2022-06-24 PROCEDURE — 88342 CHG IMMUNOCYTOCHEMISTRY: ICD-10-PCS | Mod: 26,,, | Performed by: PATHOLOGY

## 2022-06-24 PROCEDURE — 99202 OFFICE O/P NEW SF 15 MIN: CPT | Mod: 25,S$GLB,, | Performed by: COLON & RECTAL SURGERY

## 2022-06-24 PROCEDURE — 1111F DSCHRG MED/CURRENT MED MERGE: CPT | Mod: CPTII,S$GLB,, | Performed by: COLON & RECTAL SURGERY

## 2022-06-24 PROCEDURE — 88305 TISSUE EXAM BY PATHOLOGIST: ICD-10-PCS | Mod: 26,,, | Performed by: PATHOLOGY

## 2022-06-24 PROCEDURE — 3078F PR MOST RECENT DIASTOLIC BLOOD PRESSURE < 80 MM HG: ICD-10-PCS | Mod: CPTII,S$GLB,, | Performed by: COLON & RECTAL SURGERY

## 2022-06-24 PROCEDURE — 88341 IMHCHEM/IMCYTCHM EA ADD ANTB: CPT | Performed by: PATHOLOGY

## 2022-06-24 PROCEDURE — 1101F PT FALLS ASSESS-DOCD LE1/YR: CPT | Mod: CPTII,S$GLB,, | Performed by: COLON & RECTAL SURGERY

## 2022-06-24 PROCEDURE — 99499 UNLISTED E&M SERVICE: CPT | Mod: S$GLB,,, | Performed by: COLON & RECTAL SURGERY

## 2022-06-24 PROCEDURE — 1111F PR DISCHARGE MEDS RECONCILED W/ CURRENT OUTPATIENT MED LIST: ICD-10-PCS | Mod: CPTII,S$GLB,, | Performed by: COLON & RECTAL SURGERY

## 2022-06-24 PROCEDURE — 1160F RVW MEDS BY RX/DR IN RCRD: CPT | Mod: CPTII,S$GLB,, | Performed by: COLON & RECTAL SURGERY

## 2022-06-24 PROCEDURE — 45331 SIGMOIDOSCOPY AND BIOPSY: CPT | Mod: S$GLB,,, | Performed by: COLON & RECTAL SURGERY

## 2022-06-24 PROCEDURE — 3288F FALL RISK ASSESSMENT DOCD: CPT | Mod: CPTII,S$GLB,, | Performed by: COLON & RECTAL SURGERY

## 2022-06-24 PROCEDURE — 88305 TISSUE EXAM BY PATHOLOGIST: CPT | Mod: 26,,, | Performed by: PATHOLOGY

## 2022-06-24 PROCEDURE — 88305 TISSUE EXAM BY PATHOLOGIST: CPT | Performed by: PATHOLOGY

## 2022-06-24 PROCEDURE — 99202 PR OFFICE/OUTPT VISIT, NEW, LEVL II, 15-29 MIN: ICD-10-PCS | Mod: 25,S$GLB,, | Performed by: COLON & RECTAL SURGERY

## 2022-06-24 PROCEDURE — 3075F SYST BP GE 130 - 139MM HG: CPT | Mod: CPTII,S$GLB,, | Performed by: COLON & RECTAL SURGERY

## 2022-06-24 PROCEDURE — 3075F PR MOST RECENT SYSTOLIC BLOOD PRESS GE 130-139MM HG: ICD-10-PCS | Mod: CPTII,S$GLB,, | Performed by: COLON & RECTAL SURGERY

## 2022-06-24 PROCEDURE — 45331 PR SIGMOIDOSCOPY,BIOPSY: ICD-10-PCS | Mod: S$GLB,,, | Performed by: COLON & RECTAL SURGERY

## 2022-06-24 PROCEDURE — 1160F PR REVIEW ALL MEDS BY PRESCRIBER/CLIN PHARMACIST DOCUMENTED: ICD-10-PCS | Mod: CPTII,S$GLB,, | Performed by: COLON & RECTAL SURGERY

## 2022-06-24 PROCEDURE — 3078F DIAST BP <80 MM HG: CPT | Mod: CPTII,S$GLB,, | Performed by: COLON & RECTAL SURGERY

## 2022-06-24 PROCEDURE — 1126F PR PAIN SEVERITY QUANTIFIED, NO PAIN PRESENT: ICD-10-PCS | Mod: CPTII,S$GLB,, | Performed by: COLON & RECTAL SURGERY

## 2022-06-24 PROCEDURE — 3288F PR FALLS RISK ASSESSMENT DOCUMENTED: ICD-10-PCS | Mod: CPTII,S$GLB,, | Performed by: COLON & RECTAL SURGERY

## 2022-06-24 PROCEDURE — 99999 PR PBB SHADOW E&M-EST. PATIENT-LVL III: ICD-10-PCS | Mod: PBBFAC,,, | Performed by: COLON & RECTAL SURGERY

## 2022-06-24 PROCEDURE — 99499 RISK ADDL DX/OHS AUDIT: ICD-10-PCS | Mod: S$GLB,,, | Performed by: COLON & RECTAL SURGERY

## 2022-06-24 PROCEDURE — 1159F PR MEDICATION LIST DOCUMENTED IN MEDICAL RECORD: ICD-10-PCS | Mod: CPTII,S$GLB,, | Performed by: COLON & RECTAL SURGERY

## 2022-06-24 PROCEDURE — 1159F MED LIST DOCD IN RCRD: CPT | Mod: CPTII,S$GLB,, | Performed by: COLON & RECTAL SURGERY

## 2022-06-24 PROCEDURE — 88381 MICRODISSECTION MANUAL: CPT | Performed by: PATHOLOGY

## 2022-06-24 PROCEDURE — 1101F PR PT FALLS ASSESS DOC 0-1 FALLS W/OUT INJ PAST YR: ICD-10-PCS | Mod: CPTII,S$GLB,, | Performed by: COLON & RECTAL SURGERY

## 2022-06-24 NOTE — PROGRESS NOTES
Subjective:       Patient ID: Gera Londono is a 77 y.o. male.    Chief Complaint: Flexible Sigmoidoscopy    HPI  76 yo M admitted to the hospital 06/11/2022 with a lower GI bleed.  Comorbidities include hypertension, chronic kidney disease, and radiculopathy limiting his ability to ambulate.  He was noted to have a hemoglobin of 5.7 on admission.  He was transfused and underwent colonoscopy, which demonstrated a low rectal, partially obstructing mass.  After staging studies were completed as an inpatient, he was discharged home for outpatient follow-up.  Biopsies ultimately demonstrated high-grade dysplasia with intramucosal carcinoma, though endoscopically, the lesion clearly appeared consistent with a large rectal carcinoma.    CEA 3.9.    CT Chest showed no evidence of metastatic disease.     MRI pelvis:  Large circumferential low rectal tumor extending superiorly above the peritoneal reflection and inferiorly involving the anal canal.  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  *MR STAGE   -T: T4b (tumor invades or adherent to adjacent organs or structures)    -N: N+ (short axis 5-9 mm AND at least 2 morphologic criteria)   *MRF: involved   Sphincter Involvement: Yes   Suspicious Extramesorectal Lymph Nodes: No   EMVI: Yes    His case was discussed at [facility] - it was noted that the CT of the abdomen and pelvis was required to complete staging, but in the absence of any metastatic disease, the plan would be to proceed with TMT utilizing a long course radiation, followed by chemotherapy.  It was also recommended that we perform repeat biopsies to assess for MMR status.    Comes in today for flex sig in the office for repeat biopsies.  He offers no complaints.  He denies any abdominal pain or weight loss.  He does report continued rectal bleeding intermittently.    No family hx of CRC or IBD.      Review of patient's  allergies indicates:  No Known Allergies    Past Medical History:   Diagnosis Date    Essential (primary) hypertension     Stage 3 chronic kidney disease        Past Surgical History:   Procedure Laterality Date    COLONOSCOPY N/A 6/13/2022    Procedure: COLONOSCOPY;  Surgeon: Saul Pete MD;  [facility]  Service: Endoscopy;  Laterality: N/A;    ESOPHAGOGASTRODUODENOSCOPY N/A 6/13/2022    Procedure: EGD (ESOPHAGOGASTRODUODENOSCOPY);  Surgeon: Saul Pete MD;  [facility]  Service: Endoscopy;  Laterality: N/A;    NO PAST SURGERIES         Current Outpatient Medications   Medication Sig Dispense Refill    acetaminophen (TYLENOL) 500 MG tablet Take 500 mg by mouth 2 (two) times a day.      atorvastatin (LIPITOR) 20 MG tablet TAKE 1 TABLET EVERY DAY 90 tablet 3    cetirizine (ZYRTEC) 10 MG tablet Take 10 mg by mouth daily as needed for Allergies.      ergocalciferol (ERGOCALCIFEROL) 50,000 unit Cap TAKE 1 CAPSULE EVERY 7 DAYS (Patient taking differently: Take 50,000 Units by mouth every Monday.) 12 capsule 1    ferrous sulfate (FEROSUL) 325 mg (65 mg iron) Tab tablet TAKE 1 TABLET (325 MG TOTAL) BY MOUTH 2 (TWO) TIMES DAILY. FOR ANEMIA 180 tablet 3    losartan (COZAAR) 25 MG tablet Take 1 tablet (25 mg total) by mouth once daily. HOLD UNTIL SEEN BY YOUR PRIMARY CARE DOCTOR 90 tablet 3    walker Misc 1 each by Misc.(Non-Drug; Combo Route) route once daily. 1 each 0     No current facility-administered medications for this visit.       Family History   Problem Relation Age of Onset    Diabetes Mother     Hypertension Mother     Arthritis Mother     Arthritis Father     Diabetes Father     Hypertension Father     Heart disease Father     Diabetes Sister     Hypertension Sister     Arthritis Brother        Social History     Socioeconomic History    Marital status: Single   Tobacco Use    Smoking status: Former Smoker     Packs/day: 0.25     Years:  3.00     Pack years: 0.75     Types: Cigarettes     Start date: 12/8/2000    Smokeless tobacco: Never Used   Substance and Sexual Activity    Alcohol use: Not Currently    Drug use: No       Review of Systems   Constitutional: Negative for chills and fever.   HENT: Negative for congestion and sinus pressure.    Eyes: Negative for visual disturbance.   Respiratory: Negative for cough and shortness of breath.    Cardiovascular: Negative for chest pain and palpitations.   Gastrointestinal: Positive for anal bleeding. Negative for abdominal distention, abdominal pain, blood in stool, constipation, diarrhea, nausea, rectal pain and vomiting.   Endocrine: Negative for cold intolerance and heat intolerance.   Genitourinary: Negative for dysuria and frequency.   Musculoskeletal: Positive for gait problem. Negative for arthralgias and back pain.   Skin: Negative for rash.   Allergic/Immunologic: Negative for immunocompromised state.   Neurological: Positive for weakness. Negative for dizziness, light-headedness and headaches.   Psychiatric/Behavioral: Negative for confusion. The patient is not nervous/anxious.        Objective:      Physical Exam  Constitutional:       Appearance: He is well-developed.   HENT:      Head: Normocephalic and atraumatic.   Eyes:      Conjunctiva/sclera: Conjunctivae normal.   Pulmonary:      Effort: Pulmonary effort is normal. No respiratory distress.   Abdominal:      General: There is no distension.      Palpations: Abdomen is soft. There is no mass.      Tenderness: There is no abdominal tenderness. There is no guarding or rebound.   Genitourinary:     Comments: Perineum - normal perianal skin, no mass, no fissure, no external hemorrhoids  QUENTIN - good tone, large circumferential rectal mass beginning at the anal verge and extending proximally beyond the reach of my index finger.    Skin:     General: Skin is warm and dry.      Findings: No erythema.   Neurological:      Mental Status: He is  alert and oriented to person, place, and time.           PROCEDURE: FLEXIBLE SIGMOIDOSCOPY:    After explaining the procedure, indications, risks (including but not limited to bleeding and perforation), and benefits, verbal informed consent was obtained. Using the instrument # [id number], flexible sigmoidoscopy was carried out.  Proceeded to 25 cm. Further exam limited by: inadequate bowel prep, stool blocking lumen.   Findings:  Large, friable, circumferential distal rectal mass, beginning just proximal to the anal verge and extending proximally for 9-10 cm - multiple biopsies of the mass as well as normal mucosa were obtained using biopsy forceps for the purpose of assessing for MMR status. Bowel prep was adequate, procedure well tolerated without complications.    No complications were encountered;  the procedure was well tolerated.  The patient is asked to call if any unusual pains or bleeding occur.  May resume normal diet and activities at this time.    ASSESSMENT:   Distal rectal cancer    PLAN:   Per orders. Post-procedure instructions are given to the patient.   Recommendations:  Follow up pathology, complete staging, referred to Medical Oncology and Radiation Oncology    Lab Results   Component Value Date    WBC 8.30 06/16/2022    HGB 7.9 (L) 06/16/2022    HCT 27.9 (L) 06/16/2022    MCV 75 (L) 06/16/2022     06/16/2022     BMP  Lab Results   Component Value Date     06/16/2022    K 4.0 06/16/2022     06/16/2022    CO2 22 (L) 06/16/2022    BUN 15 06/16/2022    CREATININE 1.2 06/16/2022    CALCIUM 8.8 06/16/2022    ANIONGAP 10 06/16/2022    ESTGFRAFRICA >60.0 06/16/2022    EGFRNONAA 58.4 (A) 06/16/2022     CMP  Sodium   Date Value Ref Range Status   06/16/2022 139 136 - 145 mmol/L Final     Potassium   Date Value Ref Range Status   06/16/2022 4.0 3.5 - 5.1 mmol/L Final     Chloride   Date Value Ref Range Status   06/16/2022 107 95 - 110 mmol/L Final     CO2   Date Value Ref Range Status    06/16/2022 22 (L) 23 - 29 mmol/L Final     Glucose   Date Value Ref Range Status   06/16/2022 113 (H) 70 - 110 mg/dL Final     BUN   Date Value Ref Range Status   06/16/2022 15 8 - 23 mg/dL Final     Creatinine   Date Value Ref Range Status   06/16/2022 1.2 0.5 - 1.4 mg/dL Final     Calcium   Date Value Ref Range Status   06/16/2022 8.8 8.7 - 10.5 mg/dL Final     Total Protein   Date Value Ref Range Status   06/16/2022 7.2 6.0 - 8.4 g/dL Final     Albumin   Date Value Ref Range Status   06/16/2022 2.6 (L) 3.5 - 5.2 g/dL Final     Total Bilirubin   Date Value Ref Range Status   06/16/2022 0.8 0.1 - 1.0 mg/dL Final     Comment:     For infants and newborns, interpretation of results should be based  on gestational age, weight and in agreement with clinical  observations.    Premature Infant recommended reference ranges:  Up to 24 hours.............<8.0 mg/dL  Up to 48 hours............<12.0 mg/dL  3-5 days..................<15.0 mg/dL  6-29 days.................<15.0 mg/dL       Alkaline Phosphatase   Date Value Ref Range Status   06/16/2022 70 55 - 135 U/L Final     AST   Date Value Ref Range Status   06/16/2022 11 10 - 40 U/L Final     ALT   Date Value Ref Range Status   06/16/2022 5 (L) 10 - 44 U/L Final     Anion Gap   Date Value Ref Range Status   06/16/2022 10 8 - 16 mmol/L Final     eGFR if    Date Value Ref Range Status   06/16/2022 >60.0 >60 mL/min/1.73 m^2 Final     eGFR if non    Date Value Ref Range Status   06/16/2022 58.4 (A) >60 mL/min/1.73 m^2 Final     Comment:     Calculation used to obtain the estimated glomerular filtration  rate (eGFR) is the CKD-EPI equation.        Lab Results   Component Value Date    CEA 3.9 06/13/2022           Assessment:       1. Rectal cancer    2. GI bleed        Plan:   He has an appointment with medical oncology on Monday 6/27.  CT A/P still needed to complete staging.  He needs appointment with radiation oncology.  Plan is to proceed  with ENT with long course radiation therapy followed by chemotherapy, followed by restaging.    F/U with me once he has completed to ENT and undergone restaging studies.    Elvis Ho MD, FACS, FASCRS  Senior Staff Surgeon  Department of Colon & Rectal Surgery     This note was created using voice recognition software, and may contain some unrecognized transcriptional errors.

## 2022-06-24 NOTE — LETTER
June 26, 2022      Gopal Hobbs MD  [address]  [place] [address]           [facility] [address]  [address]  [facility] [address]  Phone: [phone]          Patient: Gera Londono   MR Number: [id number]   YOB: 1945   Date of Visit: 6/24/2022       Dear Dr Hobbs:    Thank you for referring Gera Londono to me for evaluation. Attached you will find relevant portions of my assessment and plan of care.    If you have questions, please do not hesitate to call me. I look forward to following Gera Londono along with you.    Sincerely,    Elvis Ho MD    Enclosure  CC:  MD Nael Horne MD    If you would like to receive this communication electronically, please contact [email or web address] or [phone] to request more information on [employer] | VIP Link access.    For providers and/or their staff who would like to refer a patient to [facility], please contact us through our one-stop-shop provider referral line, [facility], at [phone].    If you feel you have received this communication in error or would no longer like to receive these types of communications, please e-mail [email or web address]

## 2022-06-27 ENCOUNTER — TELEPHONE (OUTPATIENT)
Dept: HEMATOLOGY/ONCOLOGY | Facility: CLINIC | Age: 77
End: 2022-06-27
Payer: MEDICARE

## 2022-06-27 ENCOUNTER — OFFICE VISIT (OUTPATIENT)
Dept: FAMILY MEDICINE | Facility: CLINIC | Age: 77
End: 2022-06-27
Payer: MEDICARE

## 2022-06-27 DIAGNOSIS — N18.30 STAGE 3 CHRONIC KIDNEY DISEASE, UNSPECIFIED WHETHER STAGE 3A OR 3B CKD: ICD-10-CM

## 2022-06-27 DIAGNOSIS — K62.89 RECTAL MASS: Primary | ICD-10-CM

## 2022-06-27 DIAGNOSIS — E78.49 OTHER HYPERLIPIDEMIA: ICD-10-CM

## 2022-06-27 DIAGNOSIS — E63.9 INADEQUATE DIETARY INTAKE: ICD-10-CM

## 2022-06-27 DIAGNOSIS — K62.5 RECTAL BLEEDING: ICD-10-CM

## 2022-06-27 DIAGNOSIS — D62 ACUTE BLOOD LOSS ANEMIA: ICD-10-CM

## 2022-06-27 DIAGNOSIS — I10 ESSENTIAL (PRIMARY) HYPERTENSION: ICD-10-CM

## 2022-06-27 DIAGNOSIS — H61.23 BILATERAL IMPACTED CERUMEN: ICD-10-CM

## 2022-06-27 DIAGNOSIS — C20 RECTAL CANCER: Primary | ICD-10-CM

## 2022-06-27 DIAGNOSIS — D50.9 MICROCYTIC ANEMIA: ICD-10-CM

## 2022-06-27 DIAGNOSIS — Z09 HOSPITAL DISCHARGE FOLLOW-UP: Primary | ICD-10-CM

## 2022-06-27 DIAGNOSIS — K63.89 COLONIC MASS: ICD-10-CM

## 2022-06-27 PROCEDURE — 1160F RVW MEDS BY RX/DR IN RCRD: CPT | Mod: CPTII,95,, | Performed by: FAMILY MEDICINE

## 2022-06-27 PROCEDURE — 1159F PR MEDICATION LIST DOCUMENTED IN MEDICAL RECORD: ICD-10-PCS | Mod: CPTII,95,, | Performed by: FAMILY MEDICINE

## 2022-06-27 PROCEDURE — 99214 PR OFFICE/OUTPT VISIT, EST, LEVL IV, 30-39 MIN: ICD-10-PCS | Mod: 95,,, | Performed by: FAMILY MEDICINE

## 2022-06-27 PROCEDURE — 99214 OFFICE O/P EST MOD 30 MIN: CPT | Mod: 95,,, | Performed by: FAMILY MEDICINE

## 2022-06-27 PROCEDURE — 1160F PR REVIEW ALL MEDS BY PRESCRIBER/CLIN PHARMACIST DOCUMENTED: ICD-10-PCS | Mod: CPTII,95,, | Performed by: FAMILY MEDICINE

## 2022-06-27 PROCEDURE — 1159F MED LIST DOCD IN RCRD: CPT | Mod: CPTII,95,, | Performed by: FAMILY MEDICINE

## 2022-06-27 RX ORDER — ACETAMINOPHEN 500 MG
1 TABLET ORAL DAILY
Qty: 1 EACH | Refills: 0 | COMMUNITY
Start: 2022-06-27

## 2022-06-27 NOTE — PROGRESS NOTES
The patient location is: home  The chief complaint leading to consultation is: Hospital followup      Visit type: Virtual visit with synchronous audio and video  Total time spent with patient: 16 minutes  Each patient to whom he or she provides medical services by telemedicine is:  (1) informed of the relationship between the physician and patient and the respective role of any other health care provider with respect to management of the patient; and (2) notified that he or she may decline to receive medical services by telemedicine and may withdraw from such care at any time.         Office Visit    Patient Name: Gera Londono    : 1945  MRN: [id number]      Assessment/Plan:  Gera Londono is a 77 y.o. male who presents today for :    Hospital discharge follow-up  Rectal bleeding  Colonic mass  Acute blood loss anemia  Microcytic anemia  -AFVSS - doing well post-discharge  -continue current medications, pending Path. F/u with Oncology and CRS as schedule. Recheck H/H in 1 week.   -call clinic back with any concerns      Essential (primary) hypertension  -     blood pressure monitor Kit; 1 each by Misc.(Non-Drug; Combo Route) route once daily.  Dispense: 1 each; Refill: 0  Stage 3 chronic kidney disease, unspecified whether stage 3a or 3b CKD  Other hyperlipidemia  -continue current medication regimen  -DASH diet, regular cardiovascular exercises  -weight loss    Bilateral impacted cerumen  -     carbamide peroxide (DEBROX) 6.5 % otic solution; Place 5 drops into both ears 2 (two) times daily.  Dispense: 18 mL; Refill: 5           Follow up in 2-3 months      This note was created by combination of typed  and MModal dictation.  Transcription errors may be present.  If there are any questions, please contact me.        ----------------------------------------------------------------------------------------------------------------------      HPI:  Patient Care Team:  Gopal Hobbs MD as PCP -  General (Family Medicine)  Vish Miller MA as Care Coordinator    Gera is a 77 y.o. male with      Patient Active Problem List   Diagnosis    -HTN    -HLD    Vitamin D deficiency    -CKD stage 3    -Age-related physical debility    Osteoarthritis    Acute blood loss anemia    Microcytic anemia    Rectal mass    Rectal cancer       Patient presents today for hospital follow up from two weeks ago, when he was seen for rectal bleeding associated with BMs. His VS were stable, but he was noted to have Hgb of 5.7, for which he was given 1 unit of PRBC. EGD and colonoscopy were performed and a large colonic mass was biopsied with pending Path report. He has outpatient f/u with CRS and Oncology. BP is well controlled on current regimen. Patient has been doing well since discharge without any SOB/TREVINO/CP. Appetite is at baseline, as well as bowel routine          Additional ROS  No F/C, +fatigue  No dysphagia/sore throat/rhinorrhea  No CP/TREVINO/palpitations/swelling  No cough/wheezing/SOB  No nausea/vomiting/abd pain, +blood in stool  No muscle aches/joint pain   No rashes  No MSK weakness/HA/tingling/numbness  No anxiety/depression          Patient Active Problem List   Diagnosis    -HTN    -HLD    Vitamin D deficiency    -CKD stage 3    -Age-related physical debility    Osteoarthritis    Acute blood loss anemia    Microcytic anemia    Rectal mass    Rectal cancer       Current Medications  Medications reviewed and updated.       Current Outpatient Medications:     acetaminophen (TYLENOL) 500 MG tablet, Take 500 mg by mouth 2 (two) times a day., Disp: , Rfl:     atorvastatin (LIPITOR) 20 MG tablet, TAKE 1 TABLET EVERY DAY, Disp: 90 tablet, Rfl: 3    blood pressure monitor Kit, 1 each by Misc.(Non-Drug; Combo Route) route once daily., Disp: 1 each, Rfl: 0    carbamide peroxide (DEBROX) 6.5 % otic solution, Place 5 drops into both ears 2 (two) times daily., Disp: 18 mL, Rfl: 5    cetirizine  (ZYRTEC) 10 MG tablet, Take 10 mg by mouth daily as needed for Allergies., Disp: , Rfl:     ergocalciferol (ERGOCALCIFEROL) 50,000 unit Cap, TAKE 1 CAPSULE EVERY 7 DAYS (Patient taking differently: Take 50,000 Units by mouth every Monday.), Disp: 12 capsule, Rfl: 1    ferrous sulfate (FEROSUL) 325 mg (65 mg iron) Tab tablet, TAKE 1 TABLET (325 MG TOTAL) BY MOUTH 2 (TWO) TIMES DAILY. FOR ANEMIA, Disp: 180 tablet, Rfl: 3    losartan (COZAAR) 25 MG tablet, Take 1 tablet (25 mg total) by mouth once daily. HOLD UNTIL SEEN BY YOUR PRIMARY CARE DOCTOR (Patient not taking: Reported on 7/12/2022), Disp: 90 tablet, Rfl: 3    ondansetron (ZOFRAN) 8 MG tablet, Take 1 tablet (8 mg total) by mouth every 8 (eight) hours as needed for Nausea., Disp: 60 tablet, Rfl: 2    promethazine-dextromethorphan (PROMETHAZINE-DM) 6.25-15 mg/5 mL Syrp, Take 5 mLs by mouth 3 (three) times daily as needed (cough)., Disp: 118 mL, Rfl: 3    walker Misc, 1 each by Misc.(Non-Drug; Combo Route) route once daily., Disp: 1 each, Rfl: 0    Past Surgical History:   Procedure Laterality Date    COLONOSCOPY N/A 6/13/2022    Procedure: COLONOSCOPY;  Surgeon: Saul Pete MD;  Location: [address]);  Service: Endoscopy;  Laterality: N/A;    ESOPHAGOGASTRODUODENOSCOPY N/A 6/13/2022    Procedure: EGD (ESOPHAGOGASTRODUODENOSCOPY);  Surgeon: Saul Pete MD;  Location: [address]);  Service: Endoscopy;  Laterality: N/A;    NO PAST SURGERIES         Family History   Problem Relation Age of Onset    Diabetes Mother     Hypertension Mother     Arthritis Mother     Arthritis Father     Diabetes Father     Hypertension Father     Heart disease Father     Diabetes Sister     Hypertension Sister     Arthritis Brother        Social History     Socioeconomic History    Marital status: Single   Tobacco Use    Smoking status: Former Smoker     Packs/day: 0.25     Years: 3.00     Pack years: 0.75     Types: Cigarettes     Start  date: 12/8/2000    Smokeless tobacco: Never Used   Substance and Sexual Activity    Alcohol use: Not Currently    Drug use: No           Allergies   Review of patient's allergies indicates:  No Known Allergies          Review of Systems  See HPI        Physical Exam  There were no vitals taken for this visit.    GEN: NAD, well developed and non-toxic appearing  HEENT: NCAT, EOMI, no discharge, sclera clear, anicteric  PSYCH: AOx3, appropriate mood and affect

## 2022-06-27 NOTE — TELEPHONE ENCOUNTER
Spoke with niece, Kelsey. She requested virtual visit.  Patient is sick -- throwing up and having diarrhea since last night.   After discussing with Dr. Nichole, explained to pt's niece that med-onc and rad-onc will need to see the patient in-person.  Cancelled today's apptmnt and rescheduled for 7/5, back-to-back consults Dr Dunlap and Dr Marie.  Assured her that no treatment will be started that day. Explained the consult process and the steps involved before treatment.  Family upset with what they considered excessive poking of patient.  Patient is not eating well. Will send referral to RD.  Advised Kelsey to keep close eye on pt with risk of dehydration        --------- Message --------    6/272022  [12:22 PM] Alta Marin, I just called Mr. Londono family to add him to Dr. Dunlap schedule and I was informed the pt cannot come to any in-person visits at this time because he is too weak.     ------------------------------------    6/24/22 Spoke with daughter and scheduled pt for Monday, 6/27 with Dr. Nichole.    --------- Message --------   From: Elvis Ho MD   Sent: 6/24/2022   2:24 PM CDT   To: Tania Nguyen, CHENTE, Suzie Brewster RN     Repeat biopsies performed to evaluate for MSI status.     PLEASE get patient in to see Rad Onc and Med Onc ASAP.     Thanks.

## 2022-06-28 ENCOUNTER — TELEPHONE (OUTPATIENT)
Dept: FAMILY MEDICINE | Facility: CLINIC | Age: 77
End: 2022-06-28
Payer: MEDICARE

## 2022-06-28 DIAGNOSIS — R05.9 COUGH: Primary | ICD-10-CM

## 2022-06-28 RX ORDER — PROMETHAZINE HYDROCHLORIDE AND DEXTROMETHORPHAN HYDROBROMIDE 6.25; 15 MG/5ML; MG/5ML
5 SYRUP ORAL 3 TIMES DAILY PRN
Qty: 118 ML | Refills: 3 | Status: SHIPPED | OUTPATIENT
Start: 2022-06-28

## 2022-06-28 NOTE — TELEPHONE ENCOUNTER
----- Message from Christy Baptiste sent at 6/28/2022 12:45 PM CDT -----  Regarding: Kelsey  .Type:  Needs Medical Advice    Who Called:  - Kelsey   Symptoms (please be specific):  cough, rattling in chest   How long has patient had these symptoms:   yesterday   Pharmacy name and phone #:   .      [id number] [address] AT [address] [address]  Phone: [phone] Fax: [phone]      Would the patient rather a call back or a response via [facility]? Call   Best Call Back Number:  [phone]

## 2022-06-28 NOTE — TELEPHONE ENCOUNTER
Entered as requested.     Thanks.    Cough  -     promethazine-dextromethorphan (PROMETHAZINE-DM) 6.25-15 mg/5 mL Syrp; Take 5 mLs by mouth 3 (three) times daily as needed (cough).  Dispense: 118 mL; Refill: 3

## 2022-06-29 ENCOUNTER — PATIENT MESSAGE (OUTPATIENT)
Dept: HEMATOLOGY/ONCOLOGY | Facility: CLINIC | Age: 77
End: 2022-06-29
Payer: MEDICARE

## 2022-06-30 ENCOUNTER — TELEPHONE (OUTPATIENT)
Dept: HEMATOLOGY/ONCOLOGY | Facility: CLINIC | Age: 77
End: 2022-06-30
Payer: MEDICARE

## 2022-06-30 NOTE — TELEPHONE ENCOUNTER
"----- Message from Ike Art sent at 6/30/2022 11:02 AM CDT -----  Consult/Advisory:          Name Of Caller: Kelsey (Other)      Contact Preference?: [phone]       Provider Name: Cynthia      Does patient feel the need to be seen today? No      What is the nature of the call?: Calling to schedule nutrition consult.          Additional Notes:  "Thank you for all that you do for our patients"      "

## 2022-07-03 PROBLEM — C20 RECTAL CANCER: Status: ACTIVE | Noted: 2022-07-03

## 2022-07-04 NOTE — PROGRESS NOTES
Radiation Oncology Consult Note        Date of Service: 07/05/2022     Chief Complaint: rectal cancer     Reason for visit: consideration for radiation to the pelvis     Referring Physician: Dr Ho ([facility])     Implantable devices: denies     Therapy to Date:  No radiation        Diagnosis/Assessment:   Gera Londono is a 77 y.o. man with Stage IIIC (cT4b, cN1b, cM0) anorectal G2 adenocarcinoma with a low circumferential tumor, 12.7cm long, 1.3cm FAV, extending superiorly above peritoneal reflection and inferiorly to anal canal, T4b (right seminal vesicles, levator ani b/l, intersphincteric space, right posterolateral external sphincter and puborectalis), MRF+ multifocal, 2 suspicious mesorectal LNs, no extramesorectal LNs, EMVI+ multifocal, no mets on CT chest    PMH of PMH of HTN, CKD3, anemia, and debility      ECOG 3, disabled from back issues, has not walked in 5 years, but competent and AAO x3    Pending MMR       Plan   As per lower GI Tumor Board consensus and per NCCN guidelines v2022, we recommend long course radiation therapy with xeloda and 28 daily fractions M-F, using IMRT to decrease small bowel and bladder dose.     GI INFORMED CONSENT: Acute and delayed side effects of gastrointestinal radiation, including but not limited to fatigue, redness of the skin, desquamation, dysphagia, odynophagia, nausea, vomiting, diarrhea, incontinence, infection as well as rare but catastrophic injury to the spinal cord and digestive tract were discussed with the patient in great detail today.     I reviewed the rationale and goal of the proposed treatment course. The radiotherapeutic procedure with associated side effects and potential complications were explained. They had the opportunity to ask questions which were answered to the best of my knowledge.   The patient voiced understanding of the above and has elected to proceed with treatment.   RT consent form was signed.   They were also given our contact information  should further questions or concerns arise.     - CT simulation scan 07/8/2022  - RT consent signed       HPI:   Gera Londono is a 77 y.o. man with new diagnosis of rectal cancer who was admitted to the hospital 06/11/2022 after presenting with melena and hematochezia, without prior colonoscopies, Hgb 5.7, received a blood transfusion    Oncologic history:  6/13/2022 EGD & colonoscopy (Dr Pete, GI)   EGD    Findings: Mucosal nodule at the GEJ   Esophagus, biopsy:     Superficial esophageal squamous mucosa with active esophagitis and rare intraepithelial eosinophils, consistent with reflux esophagitis. Gastric cardia-type mucosa with reactive changes. Negative for intestinal metaplasia and dysplasia.      Colonoscopy    Findings: QUENTIN with circumferential firm rectal mass; Malignant partially obstructing tumor in the rectum extending to the anus   Rectum, mass, biopsy    High grade dysplasia/intramucosal carcinoma          6/13/2022 MRI rectum   Low circumferential anorectal tumor,12.7cm long, 1.3cm FAV, extending superiorly above peritoneal reflection and inferiorly to anal canal, T4b (right seminal vesicles, levator ani b/l, intersphincteric space, right posterolateral external sphincter and puborectalis), MRF+ multifocal, 2 suspicious mesorectal LNs, no extramesorectal LNs, EMVI+ multifocal  Stage T4bN+      6/13/2022 CT C showed no mets    6/13/2022 CT lumbar spine (back pain): no bone lesions     6/13/2022 CEA 3.9    6/22/2022 lower GI TB: repeat bx for MMR status, plan for Long-course radiation with chemosensitization followed by chemotherapy with restaging    6/24/2022 Flexible Sigmoidoscopy (Dr Ho)   QUENTIN - good tone, large circumferential rectal mass beginning at the anal verge and extending proximally beyond the reach of my index finger   Findings:  Large, friable, circumferential distal rectal mass, beginning just proximal to the anal verge and extending proximally for 9-10 cm - multiple biopsies of  the mass as well as normal mucosa were obtained using biopsy forceps for the purpose of assessing for MMR status. Bowel prep was adequate, procedure well tolerated without complications.   Rectum, mass, biopsy: Invasive, moderately differentiated adenocarcinoma  Pending MMR IHC panel    Subjective:   In clinic the patient is accompanied by his niece Kelsey.  He's on stretcher, because bed bound at home x 5 years from back issues.  His 2 nieces Kelsey and Rosey care for him. They live on the [facility])    He reports feeling well.  He reports soft stools with red blood.  No diarrhea or constipation.    The patient denies other major complaints such as pain.     The patient denies any history of radiation therapy, implantable cardiac devices, or connective tissue disease.     Social history  His 2 nieces Kelsey and Rosey care for him. They live on the [facility]).  He has no children.  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  Denies tobacco or alcohol use          Socioeconomic History    Marital status: Single   Tobacco Use    Smoking status: Former Smoker       Packs/day: 0.25       Years: 3.00       Pack years: 0.75       Types: Cigarettes       Start date: 12/8/2000    Smokeless tobacco: Never Used   Substance and Sexual Activity    Alcohol use: Not Currently    Drug use: No          Family history  No family hx of CRC or IBD     Past Medical History:   Diagnosis Date    Essential (primary) hypertension     Stage 3 chronic kidney disease        Past Surgical History:   Procedure Laterality Date    COLONOSCOPY N/A 6/13/2022    Procedure: COLONOSCOPY;  Surgeon: Saul Pete MD;  Location: [address];  Service: Endoscopy;  Laterality: N/A;    ESOPHAGOGASTRODUODENOSCOPY N/A 6/13/2022    Procedure: EGD (ESOPHAGOGASTRODUODENOSCOPY);  Surgeon: Saul Pete MD;  Location: [facility] (2ND FLR);  Service: Endoscopy;  Laterality: N/A;    NO PAST SURGERIES         Review of patient's allergies indicates:  No Known Allergies    Current Outpatient Medications on File Prior to Visit   Medication Sig Dispense Refill    acetaminophen (TYLENOL) 500 MG tablet Take 500 mg by mouth 2 (two) times a day.      atorvastatin (LIPITOR) 20 MG tablet TAKE 1 TABLET EVERY DAY 90 tablet 3    blood pressure monitor Kit 1 each by Misc.(Non-Drug; Combo Route) route once daily. 1 each 0    carbamide peroxide (DEBROX) 6.5 % otic solution Place 5 drops into both ears 2 (two) times daily. 18 mL 5    cetirizine (ZYRTEC) 10 MG tablet Take 10 mg by mouth daily as needed for Allergies.      ergocalciferol (ERGOCALCIFEROL) 50,000 unit Cap TAKE 1 CAPSULE EVERY 7 DAYS (Patient taking differently: Take 50,000 Units by mouth every Monday.) 12 capsule 1    ferrous sulfate (FEROSUL) 325 mg (65 mg iron) Tab tablet TAKE 1 TABLET (325 MG TOTAL) BY MOUTH 2 (TWO) TIMES DAILY. FOR ANEMIA 180 tablet 3    losartan (COZAAR) 25 MG tablet Take 1 tablet (25 mg total) by mouth once daily. HOLD UNTIL SEEN BY YOUR PRIMARY CARE DOCTOR 90 tablet 3    promethazine-dextromethorphan (PROMETHAZINE-DM) 6.25-15 mg/5 mL Syrp Take 5 mLs by mouth 3 (three) times daily as needed (cough). 118 mL 3    walker Misc 1 each by Misc.(Non-Drug; Combo Route) route once daily. 1 each 0     No current facility-administered medications on file prior to visit.          Review of Systems   Constitutional: Positive for unexpected weight change (loss). Negative for chills, fatigue and fever.   HENT: Negative for hearing loss, sinus pressure/congestion and tinnitus.    Respiratory: Negative for cough, shortness of breath and wheezing.    Cardiovascular: Negative for chest pain, palpitations and leg swelling.   Gastrointestinal: Positive for blood in stool. Negative for abdominal pain, constipation, diarrhea, nausea, vomiting and reflux.   Genitourinary: Negative  for dysuria, frequency, hematuria and urgency.   Musculoskeletal: Positive for back pain. Negative for arthralgias and neck pain.   Integumentary:  Negative for rash.   Neurological: Negative for seizures, speech difficulty, weakness, numbness, headaches and memory loss.   Psychiatric/Behavioral: Negative for dysphoric mood. The patient is not nervous/anxious.                Objective:   Physical Exam  Vitals reviewed.   Constitutional:       Appearance: Normal appearance On stretcher   HENT:      Head: Normocephalic and atraumatic.      Mouth/Throat:      Mouth: Mucous membranes are moist.   Eyes:      Conjunctiva/sclera: Conjunctivae normal.   Cardiovascular:      Comments: extremities well perfused  Pulmonary:      Effort: Pulmonary effort is normal.   Abdominal:      General: There is no distension.   Genitourinary:     Comments: QUENTIN deferred  Musculoskeletal:         General: Normal range of motion.      Cervical back: Normal range of motion.   Skin:     General: Skin is warm and dry.   Neurological:      General: No focal deficit present.      Mental Status: alert and oriented to person, place, and time.   Psychiatric:         Mood and Affect: Mood normal.         Behavior: Behavior normal.    AAO x3            Imaging: I have personally reviewed the patient's available images and reports and summarized pertinent findings above in HPI.      Pathology: I have personally reviewed the patient's available pathology and summarized pertinent findings above in HPI.      Laboratory: I have personally reviewed the patient's available laboratory values and summarized pertinent findings above in HPI.         I spent approximately 60 minutes reviewing the available records and evaluating the patient, out of which over 50% of the time was spent face to face with the patient in counseling and coordinating this patient's care.     Thank you for the opportunity to care for this patient. Please do not hesitate to contact me  with any questions.     Nael Dunlap MD/PhD

## 2022-07-05 ENCOUNTER — OFFICE VISIT (OUTPATIENT)
Dept: RADIATION ONCOLOGY | Facility: CLINIC | Age: 77
End: 2022-07-05
Payer: MEDICARE

## 2022-07-05 ENCOUNTER — OFFICE VISIT (OUTPATIENT)
Dept: HEMATOLOGY/ONCOLOGY | Facility: CLINIC | Age: 77
End: 2022-07-05
Payer: MEDICARE

## 2022-07-05 VITALS
HEART RATE: 92 BPM | SYSTOLIC BLOOD PRESSURE: 96 MMHG | HEIGHT: 73 IN | RESPIRATION RATE: 18 BRPM | WEIGHT: 160 LBS | BODY MASS INDEX: 21.2 KG/M2 | TEMPERATURE: 98 F | OXYGEN SATURATION: 98 % | DIASTOLIC BLOOD PRESSURE: 70 MMHG

## 2022-07-05 VITALS
HEART RATE: 92 BPM | TEMPERATURE: 98 F | DIASTOLIC BLOOD PRESSURE: 70 MMHG | RESPIRATION RATE: 18 BRPM | OXYGEN SATURATION: 98 % | SYSTOLIC BLOOD PRESSURE: 96 MMHG | BODY MASS INDEX: 21.2 KG/M2 | HEIGHT: 73 IN | WEIGHT: 160 LBS

## 2022-07-05 DIAGNOSIS — C20 RECTAL CANCER: Primary | ICD-10-CM

## 2022-07-05 DIAGNOSIS — N18.31 STAGE 3A CHRONIC KIDNEY DISEASE: ICD-10-CM

## 2022-07-05 DIAGNOSIS — K62.5 GASTROINTESTINAL HEMORRHAGE ASSOCIATED WITH ANORECTAL SOURCE: ICD-10-CM

## 2022-07-05 DIAGNOSIS — R54 AGE-RELATED PHYSICAL DEBILITY: ICD-10-CM

## 2022-07-05 DIAGNOSIS — K63.89 COLONIC MASS: ICD-10-CM

## 2022-07-05 DIAGNOSIS — D50.9 MICROCYTIC ANEMIA: ICD-10-CM

## 2022-07-05 DIAGNOSIS — C20 RECTAL CANCER: ICD-10-CM

## 2022-07-05 PROCEDURE — 1159F MED LIST DOCD IN RCRD: CPT | Mod: CPTII,S$GLB,, | Performed by: INTERNAL MEDICINE

## 2022-07-05 PROCEDURE — 1101F PR PT FALLS ASSESS DOC 0-1 FALLS W/OUT INJ PAST YR: ICD-10-PCS | Mod: CPTII,S$GLB,, | Performed by: STUDENT IN AN ORGANIZED HEALTH CARE EDUCATION/TRAINING PROGRAM

## 2022-07-05 PROCEDURE — 99999 PR PBB SHADOW E&M-EST. PATIENT-LVL IV: ICD-10-PCS | Mod: PBBFAC,,, | Performed by: STUDENT IN AN ORGANIZED HEALTH CARE EDUCATION/TRAINING PROGRAM

## 2022-07-05 PROCEDURE — 1126F AMNT PAIN NOTED NONE PRSNT: CPT | Mod: CPTII,S$GLB,, | Performed by: INTERNAL MEDICINE

## 2022-07-05 PROCEDURE — 1160F RVW MEDS BY RX/DR IN RCRD: CPT | Mod: CPTII,S$GLB,, | Performed by: STUDENT IN AN ORGANIZED HEALTH CARE EDUCATION/TRAINING PROGRAM

## 2022-07-05 PROCEDURE — 1126F PR PAIN SEVERITY QUANTIFIED, NO PAIN PRESENT: ICD-10-PCS | Mod: CPTII,S$GLB,, | Performed by: INTERNAL MEDICINE

## 2022-07-05 PROCEDURE — 99499 UNLISTED E&M SERVICE: CPT | Mod: S$GLB,,, | Performed by: INTERNAL MEDICINE

## 2022-07-05 PROCEDURE — 1111F DSCHRG MED/CURRENT MED MERGE: CPT | Mod: CPTII,S$GLB,, | Performed by: INTERNAL MEDICINE

## 2022-07-05 PROCEDURE — 3074F PR MOST RECENT SYSTOLIC BLOOD PRESSURE < 130 MM HG: ICD-10-PCS | Mod: CPTII,S$GLB,, | Performed by: STUDENT IN AN ORGANIZED HEALTH CARE EDUCATION/TRAINING PROGRAM

## 2022-07-05 PROCEDURE — 3074F PR MOST RECENT SYSTOLIC BLOOD PRESSURE < 130 MM HG: ICD-10-PCS | Mod: CPTII,S$GLB,, | Performed by: INTERNAL MEDICINE

## 2022-07-05 PROCEDURE — 3078F DIAST BP <80 MM HG: CPT | Mod: CPTII,S$GLB,, | Performed by: STUDENT IN AN ORGANIZED HEALTH CARE EDUCATION/TRAINING PROGRAM

## 2022-07-05 PROCEDURE — 99999 PR PBB SHADOW E&M-EST. PATIENT-LVL IV: CPT | Mod: PBBFAC,,, | Performed by: INTERNAL MEDICINE

## 2022-07-05 PROCEDURE — 99499 RISK ADDL DX/OHS AUDIT: ICD-10-PCS | Mod: S$GLB,,, | Performed by: INTERNAL MEDICINE

## 2022-07-05 PROCEDURE — 3074F SYST BP LT 130 MM HG: CPT | Mod: CPTII,S$GLB,, | Performed by: STUDENT IN AN ORGANIZED HEALTH CARE EDUCATION/TRAINING PROGRAM

## 2022-07-05 PROCEDURE — 3288F PR FALLS RISK ASSESSMENT DOCUMENTED: ICD-10-PCS | Mod: CPTII,S$GLB,, | Performed by: STUDENT IN AN ORGANIZED HEALTH CARE EDUCATION/TRAINING PROGRAM

## 2022-07-05 PROCEDURE — 99999 PR PBB SHADOW E&M-EST. PATIENT-LVL IV: ICD-10-PCS | Mod: PBBFAC,,, | Performed by: INTERNAL MEDICINE

## 2022-07-05 PROCEDURE — 3078F PR MOST RECENT DIASTOLIC BLOOD PRESSURE < 80 MM HG: ICD-10-PCS | Mod: CPTII,S$GLB,, | Performed by: STUDENT IN AN ORGANIZED HEALTH CARE EDUCATION/TRAINING PROGRAM

## 2022-07-05 PROCEDURE — 3074F SYST BP LT 130 MM HG: CPT | Mod: CPTII,S$GLB,, | Performed by: INTERNAL MEDICINE

## 2022-07-05 PROCEDURE — 99205 PR OFFICE/OUTPT VISIT, NEW, LEVL V, 60-74 MIN: ICD-10-PCS | Mod: S$GLB,,, | Performed by: STUDENT IN AN ORGANIZED HEALTH CARE EDUCATION/TRAINING PROGRAM

## 2022-07-05 PROCEDURE — 1111F PR DISCHARGE MEDS RECONCILED W/ CURRENT OUTPATIENT MED LIST: ICD-10-PCS | Mod: CPTII,S$GLB,, | Performed by: INTERNAL MEDICINE

## 2022-07-05 PROCEDURE — 1111F DSCHRG MED/CURRENT MED MERGE: CPT | Mod: CPTII,S$GLB,, | Performed by: STUDENT IN AN ORGANIZED HEALTH CARE EDUCATION/TRAINING PROGRAM

## 2022-07-05 PROCEDURE — 1126F PR PAIN SEVERITY QUANTIFIED, NO PAIN PRESENT: ICD-10-PCS | Mod: CPTII,S$GLB,, | Performed by: STUDENT IN AN ORGANIZED HEALTH CARE EDUCATION/TRAINING PROGRAM

## 2022-07-05 PROCEDURE — 1126F AMNT PAIN NOTED NONE PRSNT: CPT | Mod: CPTII,S$GLB,, | Performed by: STUDENT IN AN ORGANIZED HEALTH CARE EDUCATION/TRAINING PROGRAM

## 2022-07-05 PROCEDURE — 1159F PR MEDICATION LIST DOCUMENTED IN MEDICAL RECORD: ICD-10-PCS | Mod: CPTII,S$GLB,, | Performed by: STUDENT IN AN ORGANIZED HEALTH CARE EDUCATION/TRAINING PROGRAM

## 2022-07-05 PROCEDURE — 99205 OFFICE O/P NEW HI 60 MIN: CPT | Mod: S$GLB,,, | Performed by: STUDENT IN AN ORGANIZED HEALTH CARE EDUCATION/TRAINING PROGRAM

## 2022-07-05 PROCEDURE — 99205 PR OFFICE/OUTPT VISIT, NEW, LEVL V, 60-74 MIN: ICD-10-PCS | Mod: S$GLB,,, | Performed by: INTERNAL MEDICINE

## 2022-07-05 PROCEDURE — 99205 OFFICE O/P NEW HI 60 MIN: CPT | Mod: S$GLB,,, | Performed by: INTERNAL MEDICINE

## 2022-07-05 PROCEDURE — 1111F PR DISCHARGE MEDS RECONCILED W/ CURRENT OUTPATIENT MED LIST: ICD-10-PCS | Mod: CPTII,S$GLB,, | Performed by: STUDENT IN AN ORGANIZED HEALTH CARE EDUCATION/TRAINING PROGRAM

## 2022-07-05 PROCEDURE — 3078F DIAST BP <80 MM HG: CPT | Mod: CPTII,S$GLB,, | Performed by: INTERNAL MEDICINE

## 2022-07-05 PROCEDURE — 3288F FALL RISK ASSESSMENT DOCD: CPT | Mod: CPTII,S$GLB,, | Performed by: STUDENT IN AN ORGANIZED HEALTH CARE EDUCATION/TRAINING PROGRAM

## 2022-07-05 PROCEDURE — 1159F MED LIST DOCD IN RCRD: CPT | Mod: CPTII,S$GLB,, | Performed by: STUDENT IN AN ORGANIZED HEALTH CARE EDUCATION/TRAINING PROGRAM

## 2022-07-05 PROCEDURE — 3078F PR MOST RECENT DIASTOLIC BLOOD PRESSURE < 80 MM HG: ICD-10-PCS | Mod: CPTII,S$GLB,, | Performed by: INTERNAL MEDICINE

## 2022-07-05 PROCEDURE — 99999 PR PBB SHADOW E&M-EST. PATIENT-LVL IV: CPT | Mod: PBBFAC,,, | Performed by: STUDENT IN AN ORGANIZED HEALTH CARE EDUCATION/TRAINING PROGRAM

## 2022-07-05 PROCEDURE — 1159F PR MEDICATION LIST DOCUMENTED IN MEDICAL RECORD: ICD-10-PCS | Mod: CPTII,S$GLB,, | Performed by: INTERNAL MEDICINE

## 2022-07-05 PROCEDURE — 1101F PT FALLS ASSESS-DOCD LE1/YR: CPT | Mod: CPTII,S$GLB,, | Performed by: STUDENT IN AN ORGANIZED HEALTH CARE EDUCATION/TRAINING PROGRAM

## 2022-07-05 PROCEDURE — 1160F PR REVIEW ALL MEDS BY PRESCRIBER/CLIN PHARMACIST DOCUMENTED: ICD-10-PCS | Mod: CPTII,S$GLB,, | Performed by: STUDENT IN AN ORGANIZED HEALTH CARE EDUCATION/TRAINING PROGRAM

## 2022-07-05 RX ORDER — CAPECITABINE 500 MG/1
TABLET, FILM COATED ORAL
Qty: 168 TABLET | Refills: 0 | Status: SHIPPED | OUTPATIENT
Start: 2022-07-05 | End: 2022-07-15

## 2022-07-05 RX ORDER — ONDANSETRON HYDROCHLORIDE 8 MG/1
8 TABLET, FILM COATED ORAL EVERY 8 HOURS PRN
Qty: 60 TABLET | Refills: 2 | Status: SHIPPED | OUTPATIENT
Start: 2022-07-05 | End: 2022-08-22

## 2022-07-05 NOTE — Clinical Note
Judith Nayak.  He may be a transportation challenge. When do you plan to begin radiation? Thanks, Adrian

## 2022-07-05 NOTE — PROGRESS NOTES
MEDICAL ONCOLOGY - NEW PATIENT VISIT    Reason for visit: Rectal cancer    Best Contact Phone Number(s): [phone] (home)      Cancer/Stage/TNM:   Cancer Staging  Rectal cancer  Staging form: Colon and Rectum, AJCC 8th Edition  - Clinical stage from 6/24/2022: Stage IIIC (cT4b, cN1b, cM0) - Signed by Nael Dunlap MD on 7/3/2022       Oncology History   Rectal cancer   6/24/2022 Cancer Staged    Staging form: Colon and Rectum, AJCC 8th Edition  - Clinical stage from 6/24/2022: Stage IIIC (cT4b, cN1b, cM0)     7/3/2022 Initial Diagnosis    Rectal cancer          HPI:   77 y.o. male with weakness 2/2 radiculopathy, HTN, CKD who presents for further management of recently diagnosed rectal cancer.  Was hospitalized for severe anemia, BRBPR, underwent colonoscopy with large rectal mass, biopsy of which did not confirm invasive adenocarcinoma.  Received blood transfusions, discharged 6/17/22.  Saw Dr. Ho with flex sig showing large distal rectal mass - 9-10 cm in size, biopsies confirmed invasive adenocarcinoma, MMR status still pending.  MRI rectal cancer protocol showed low rectal cancer T4bN+.  Reviewed at tumor board with recommendations for long course TYLER.    Presents today with his wife.  He is in a stretcher.  Has lost some weight, denies any pain.  No further hematochezia.  Spends most of his day in hospital bed, which has been the case for about 5-6 years.  No other complaints today.    History has been obtained by chart review and discussion with the patient.    ROS:   Review of Systems   Constitutional: Positive for malaise/fatigue and weight loss. Negative for chills and fever.   HENT: Negative for sore throat.    Eyes: Negative for blurred vision and pain.   Respiratory: Negative for cough and shortness of breath.    Cardiovascular: Negative for chest pain and leg swelling.   Gastrointestinal: Negative for abdominal pain, constipation, diarrhea, nausea and vomiting.   Genitourinary: Negative for  dysuria and frequency.   Musculoskeletal: Negative for back pain, falls and myalgias.   Skin: Negative for rash.   Neurological: Positive for weakness. Negative for dizziness and headaches.   Endo/Heme/Allergies: Does not bruise/bleed easily.   Psychiatric/Behavioral: Negative for depression. The patient is not nervous/anxious.    All other systems reviewed and are negative.      Past Medical History:   Past Medical History:   Diagnosis Date    Essential (primary) hypertension     Stage 3 chronic kidney disease         Past Surgical History:   Past Surgical History:   Procedure Laterality Date    COLONOSCOPY N/A 6/13/2022    Procedure: COLONOSCOPY;  Surgeon: Saul Pete MD;  Location: [facility] ([address] [place] [address]);  Service: Endoscopy;  Laterality: N/A;    ESOPHAGOGASTRODUODENOSCOPY N/A 6/13/2022    Procedure: EGD (ESOPHAGOGASTRODUODENOSCOPY);  Surgeon: Saul Pete MD;  Location: [address]);  Service: Endoscopy;  Laterality: N/A;    NO PAST SURGERIES          Family History:   Family History   Problem Relation Age of Onset    Diabetes Mother     Hypertension Mother     Arthritis Mother     Arthritis Father     Diabetes Father     Hypertension Father     Heart disease Father     Diabetes Sister     Hypertension Sister     Arthritis Brother         Social History:   Social History     Tobacco Use    Smoking status: Former Smoker     Packs/day: 0.25     Years: 3.00     Pack years: 0.75     Types: Cigarettes     Start date: 12/8/2000    Smokeless tobacco: Never Used   Substance Use Topics    Alcohol use: Not Currently        I have reviewed and updated the patient's past medical, surgical, family and social histories.    Allergies:   Review of patient's allergies indicates:  No Known Allergies     Medications:   Current Outpatient Medications   Medication Sig Dispense Refill    acetaminophen (TYLENOL) 500 MG tablet Take 500 mg by mouth 2 (two) times a day.      atorvastatin (LIPITOR) 20 MG  "tablet TAKE 1 TABLET EVERY DAY 90 tablet 3    blood pressure monitor Kit 1 each by Misc.(Non-Drug; Combo Route) route once daily. 1 each 0    carbamide peroxide (DEBROX) 6.5 % otic solution Place 5 drops into both ears 2 (two) times daily. 18 mL 5    cetirizine (ZYRTEC) 10 MG tablet Take 10 mg by mouth daily as needed for Allergies.      ergocalciferol (ERGOCALCIFEROL) 50,000 unit Cap TAKE 1 CAPSULE EVERY 7 DAYS (Patient taking differently: Take 50,000 Units by mouth every Monday.) 12 capsule 1    ferrous sulfate (FEROSUL) 325 mg (65 mg iron) Tab tablet TAKE 1 TABLET (325 MG TOTAL) BY MOUTH 2 (TWO) TIMES DAILY. FOR ANEMIA 180 tablet 3    losartan (COZAAR) 25 MG tablet Take 1 tablet (25 mg total) by mouth once daily. HOLD UNTIL SEEN BY YOUR PRIMARY CARE DOCTOR 90 tablet 3    ondansetron (ZOFRAN) 8 MG tablet Take 1 tablet (8 mg total) by mouth every 8 (eight) hours as needed for Nausea. 60 tablet 2    promethazine-dextromethorphan (PROMETHAZINE-DM) 6.25-15 mg/5 mL Syrp Take 5 mLs by mouth 3 (three) times daily as needed (cough). 118 mL 3    walker Misc 1 each by Misc.(Non-Drug; Combo Route) route once daily. 1 each 0     No current facility-administered medications for this visit.        Physical Exam:   BP 96/70 (BP Location: Left arm, Patient Position: Sitting, BP Method: Large (Automatic))   Pulse 92   Temp 98.4 °F (36.9 °C) (Other (see comments))   Resp 18   Ht 6' 1" (1.854 m)   Wt 72.6 kg (160 lb)   SpO2 98%   BMI 21.11 kg/m²      ECOG Performance status: 2            Physical Exam  Vitals reviewed.   Constitutional:       General: He is not in acute distress.     Appearance: He is normal weight.      Comments: Lying in stretcher   HENT:      Head: Normocephalic and atraumatic.      Right Ear: External ear normal.      Left Ear: External ear normal.      Nose: Nose normal.      Mouth/Throat:      Mouth: Mucous membranes are moist.      Pharynx: Oropharynx is clear. No posterior oropharyngeal " erythema.   Eyes:      General: No scleral icterus.     Extraocular Movements: Extraocular movements intact.      Conjunctiva/sclera: Conjunctivae normal.      Pupils: Pupils are equal, round, and reactive to light.   Cardiovascular:      Rate and Rhythm: Normal rate and regular rhythm.      Pulses: Normal pulses.      Heart sounds: Normal heart sounds.   Pulmonary:      Effort: Pulmonary effort is normal.      Breath sounds: Normal breath sounds. No wheezing or rales.   Abdominal:      General: Bowel sounds are normal. There is no distension.      Palpations: Abdomen is soft.      Tenderness: There is no abdominal tenderness.   Musculoskeletal:         General: No swelling. Normal range of motion.      Cervical back: Normal range of motion and neck supple.   Skin:     General: Skin is warm.      Coloration: Skin is not jaundiced.      Findings: No erythema or rash.   Neurological:      Mental Status: He is alert and oriented to person, place, and time. Mental status is at baseline.      Gait: Gait abnormal.   Psychiatric:         Mood and Affect: Mood normal.         Behavior: Behavior normal.         Thought Content: Thought content normal.         Judgment: Judgment normal.           Labs:   No results found for this or any previous visit (from the past 48 hour(s)).     I have reviewed the pertinent labs from 6/16/22 which are notable for microcytic anemia, low albumin.    Imaging:       I have personally reviewed the 6/4/22 pelvic MRI imaging which is notable for large circumferential low rectal tumor.    Path:   6/24/22:  RELIAPATH DIAGNOSIS:   A.   RECTAL MASS, BIOPSY:          -Invasive, moderately differentiated adenocarcinoma.          A MMR IHC panel will be performed on block (A1) at [facility] and will be   reported under separate cover.       Assessment:       1. Rectal cancer    2. Colonic mass    3. Gastrointestinal hemorrhage associated with anorectal source    4. Microcytic anemia    5. Stage 3a  chronic kidney disease    6. Age-related physical debility          Plan:             # Rectal cancer  He has biopsy proven rectal adenocarcinoma, G2, MMR testing is pending.  MRI shows a T4bN+ tumor.  CT chest is negative for metastatic disease, but he does not yet have abdominal cross-sectional imaging.    I discussed with him that assuming his CT abdomen (ordered today) is negative for distant disease, he has locally advanced but potentially curable rectal cancer.  We discussed his case at our colorectal [facility] with recommendations for long course TYLER with capecitabine followed by chemotherapy with FOLFOX x 4 months.  Repeat staging scans following this to determine response and role for surgical resection.      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  With that said, his quality of life is reportedly good and he is interested in pursuing treatment.    I discussed the side effects and potential toxicities of capecitabine with them today and provided a written handout on the medication.  I am prescribing capecitabine 500 mg tabs with instructions to take 3 tabs in the AM and 3 tabs in the PM on the days of radiation only (just under 825 mg/m2 BID).  This should be taken with food.  The prescription was sent today to the [facility].  Instructed to begin the capecitabine on the first day of radiation.    I will follow-up with him prior to week 2 of radiation.  He will need port placement prior to IV chemotherapy after chemoradiation.    Will check PGX to eval for DPYD deficiency.    # JENNIFER 2/2 GI bleed  Will order iron studies.  Likely will need to be set up for IV iron.    # CKD  Monitor renal function while on Xeloda.    # Debility  This should not necessarily preclude chemotherapy.  Will monitor closely his tolerance.    Follow up: 2-3 weeks for tolerance check on Xeloda.     The above  information has been reviewed with the patient and all questions have been answered to their apparent satisfaction.  They understand that they can call the clinic with any questions.     Jeff Marie MD  Hematology/Oncology  [facility]    Route Chart for Scheduling    Med Onc Chart Routing  Urgent    Follow up with physician 3 weeks. With CBC, CMP   Follow up with LANDON    Infusion scheduling note    Injection scheduling note    Labs CBC, CMP, iron and TIBC and ferritin   Lab interval:  & Pharmacogenomics and Type & Screen to be scheduled this Friday (7/8/22)    Imaging    Pharmacy appointment    Other referrals

## 2022-07-05 NOTE — PLAN OF CARE
START ON PATHWAY REGIMEN - Colorectal    [id number]        Capecitabine (Xeloda)           Additional Orders: Capecitabine is given concurrently with radiation   therapy.    **Always confirm dose/schedule in your pharmacy ordering system**    Patient Characteristics:  Preoperative or Nonsurgical Candidate (Clinical Staging), Rectal, cT3 - cT4, cN0   or Any cT, cN+  Tumor Location: Rectal  Therapeutic Status: Preoperative or Nonsurgical Candidate (Clinical Staging)  AJCC T Category: cT4b  AJCC N Category: cN1b  AJCC M Category: cM0  AJCC 8 Stage Grouping: IIIC  Intent of Therapy:  Curative Intent, Discussed with Patient

## 2022-07-06 ENCOUNTER — SPECIALTY PHARMACY (OUTPATIENT)
Dept: PHARMACY | Facility: CLINIC | Age: 77
End: 2022-07-06
Payer: MEDICARE

## 2022-07-06 NOTE — TELEPHONE ENCOUNTER
RX received for Xeloda. Tried to submit PA via CMM but [employer] is down. Will continue to follow up.

## 2022-07-07 ENCOUNTER — CARE AT HOME (OUTPATIENT)
Dept: HOME HEALTH SERVICES | Facility: CLINIC | Age: 77
End: 2022-07-07
Payer: MEDICARE

## 2022-07-07 DIAGNOSIS — C20 RECTAL CANCER: Primary | ICD-10-CM

## 2022-07-07 PROCEDURE — 99350 HOME/RES VST EST HIGH MDM 60: CPT | Mod: S$GLB,,, | Performed by: NURSE PRACTITIONER

## 2022-07-07 PROCEDURE — 99350 PR HOME VISIT,ESTAB PATIENT,LEVEL IV: ICD-10-PCS | Mod: S$GLB,,, | Performed by: NURSE PRACTITIONER

## 2022-07-07 PROCEDURE — 1111F DSCHRG MED/CURRENT MED MERGE: CPT | Mod: CPTII,S$GLB,, | Performed by: NURSE PRACTITIONER

## 2022-07-07 PROCEDURE — 99497 ADVNCD CARE PLAN 30 MIN: CPT | Mod: S$GLB,,, | Performed by: NURSE PRACTITIONER

## 2022-07-07 PROCEDURE — 99497 PR ADVNCD CARE PLAN 30 MIN: ICD-10-PCS | Mod: S$GLB,,, | Performed by: NURSE PRACTITIONER

## 2022-07-07 PROCEDURE — 1111F PR DISCHARGE MEDS RECONCILED W/ CURRENT OUTPATIENT MED LIST: ICD-10-PCS | Mod: CPTII,S$GLB,, | Performed by: NURSE PRACTITIONER

## 2022-07-07 NOTE — TELEPHONE ENCOUNTER
PA denied under Part D because it is a Part B covered drug. Estimated copay is $24.12. forwarding for initial consult.

## 2022-07-08 ENCOUNTER — TELEPHONE (OUTPATIENT)
Dept: HEMATOLOGY/ONCOLOGY | Facility: CLINIC | Age: 77
End: 2022-07-08
Payer: MEDICARE

## 2022-07-08 ENCOUNTER — HOSPITAL ENCOUNTER (OUTPATIENT)
Dept: RADIATION THERAPY | Facility: HOSPITAL | Age: 77
Discharge: HOME | End: 2022-07-08
Attending: STUDENT IN AN ORGANIZED HEALTH CARE EDUCATION/TRAINING PROGRAM
Payer: MEDICARE

## 2022-07-08 ENCOUNTER — SPECIALTY PHARMACY (OUTPATIENT)
Dept: PHARMACY | Facility: CLINIC | Age: 77
End: 2022-07-08
Payer: MEDICARE

## 2022-07-08 PROCEDURE — 77334 PR  RADN TREATMENT AID(S) COMPLX: ICD-10-PCS | Mod: 26,,, | Performed by: STUDENT IN AN ORGANIZED HEALTH CARE EDUCATION/TRAINING PROGRAM

## 2022-07-08 PROCEDURE — 77014 PR  CT GUIDANCE PLACEMENT RAD THERAPY FIELDS: CPT | Mod: 26,,, | Performed by: STUDENT IN AN ORGANIZED HEALTH CARE EDUCATION/TRAINING PROGRAM

## 2022-07-08 PROCEDURE — 77263 PR  RADIATION THERAPY PLAN COMPLEX: ICD-10-PCS | Mod: ,,, | Performed by: STUDENT IN AN ORGANIZED HEALTH CARE EDUCATION/TRAINING PROGRAM

## 2022-07-08 PROCEDURE — 77014 HC CT GUIDANCE RADIATION THERAPY FLDS PLACEMENT: CPT | Mod: TC | Performed by: STUDENT IN AN ORGANIZED HEALTH CARE EDUCATION/TRAINING PROGRAM

## 2022-07-08 PROCEDURE — 77334 RADIATION TREATMENT AID(S): CPT | Mod: TC | Performed by: STUDENT IN AN ORGANIZED HEALTH CARE EDUCATION/TRAINING PROGRAM

## 2022-07-08 PROCEDURE — 77014 PR  CT GUIDANCE PLACEMENT RAD THERAPY FIELDS: ICD-10-PCS | Mod: 26,,, | Performed by: STUDENT IN AN ORGANIZED HEALTH CARE EDUCATION/TRAINING PROGRAM

## 2022-07-08 PROCEDURE — 77334 RADIATION TREATMENT AID(S): CPT | Mod: 26,,, | Performed by: STUDENT IN AN ORGANIZED HEALTH CARE EDUCATION/TRAINING PROGRAM

## 2022-07-08 PROCEDURE — 77263 THER RADIOLOGY TX PLNG CPLX: CPT | Mod: ,,, | Performed by: STUDENT IN AN ORGANIZED HEALTH CARE EDUCATION/TRAINING PROGRAM

## 2022-07-08 NOTE — TELEPHONE ENCOUNTER
Specialty Pharmacy - Initial Clinical Assessment    Specialty Medication Orders Linked to Encounter    Flowsheet Row Most Recent Value   Medication #1 capecitabine (XELODA) 500 MG Tab (Order#[id number], Rx#[id number])        Patient Diagnosis   C20 - Rectal cancer    Fred Londono is a 77 y.o. male, who is followed by the specialty pharmacy service for management and education.    Recent Encounters     Date Type Provider Description    07/08/2022 Specialty Pharmacy Nella Quarles PharmD Initial Clinical Assessment    07/06/2022 Specialty Pharmacy Nella Quarles PharmD Referral Authorization        Clinical call attempts since last clinical assessment   No call attempts found.     Current Outpatient Medications   Medication Sig    acetaminophen (TYLENOL) 500 MG tablet Take 500 mg by mouth 2 (two) times a day.    atorvastatin (LIPITOR) 20 MG tablet TAKE 1 TABLET EVERY DAY    blood pressure monitor Kit 1 each by Misc.(Non-Drug; Combo Route) route once daily.    capecitabine (XELODA) 500 MG Tab Take 3 tabs twice daily Monday, Tuesday, Wednesday, Thursday, and Friday for the duration of radiation therapy. Take with food..    carbamide peroxide (DEBROX) 6.5 % otic solution Place 5 drops into both ears 2 (two) times daily.    cetirizine (ZYRTEC) 10 MG tablet Take 10 mg by mouth daily as needed for Allergies.    ergocalciferol (ERGOCALCIFEROL) 50,000 unit Cap TAKE 1 CAPSULE EVERY 7 DAYS (Patient taking differently: Take 50,000 Units by mouth every Monday.)    ferrous sulfate (FEROSUL) 325 mg (65 mg iron) Tab tablet TAKE 1 TABLET (325 MG TOTAL) BY MOUTH 2 (TWO) TIMES DAILY. FOR ANEMIA    losartan (COZAAR) 25 MG tablet Take 1 tablet (25 mg total) by mouth once daily. HOLD UNTIL SEEN BY YOUR PRIMARY CARE DOCTOR    ondansetron (ZOFRAN) 8 MG tablet Take 1 tablet (8 mg total) by mouth every 8 (eight) hours as needed for Nausea.    promethazine-dextromethorphan (PROMETHAZINE-DM) 6.25-15 mg/5 mL Syrp  Take 5 mLs by mouth 3 (three) times daily as needed (cough).    walker Misc 1 each by Misc.(Non-Drug; Combo Route) route once daily.   Last reviewed on 7/5/2022  8:39 PM by Nael Dunlap MD    Review of patient's allergies indicates:  No Known AllergiesLast reviewed on  7/5/2022 8:39 PM by Nael Dunlap    Drug Interactions    Drug interactions evaluated: yes  Clinically relevant drug interactions identified: yes   Interactions list: Xeloda and Zofran - CAT B    Drug management plan: Monitor for QT prolongation    Provided the patient with educational material regarding drug interactions: not applicable         Adverse Effects    *All other systems reviewed and are negative       Assessment Questions - Documented Responses    Flowsheet Row Most Recent Value   Assessment    Medication Reconciliation completed for patient Yes   During the past 4 weeks, has patient missed any activities due to condition or medication? No   During the past 4 weeks, did patient have any of the following urgent care visits? None   Goals of Therapy Status Discussed (new start)   Status of the patients ability to self-administer: Is Able   All education points have been covered with patient? Yes, supplemental printed education provided   Welcome packet contents reviewed and discussed with patient? Yes   Assesment completed? Yes   Plan Therapy being initiated   Do you need to open a clinical intervention (i-vent)? No   Do you want to schedule first shipment? Yes   Medication #1 Assessment Info    Patient status New medication, New to [facility]   Is this medication appropriate for the patient? Yes   Is this medication effective? Not yet started        Refill Questions - Documented Responses    Flowsheet Row Most Recent Value   Patient Availability and HIPAA Verification    Does patient want to proceed with activity? Yes   HIPAA/medical authority confirmed? Yes   Relationship to patient of person spoken to? Self   Refill Screening  "Questions    When does the patient need to receive the medication? 07/18/22   Refill Delivery Questions    How will the patient receive the medication? Delivery Cammy   When does the patient need to receive the medication? 07/18/22   Shipping Address Home   Address in [facility] confirmed and updated if neccessary? Yes   Expected Copay ($) 0   Is the patient able to afford the medication copay? Yes   Payment Method zero copay   Days supply of Refill 28   Supplies needed? No supplies needed   Refill activity completed? Yes   Refill activity plan Refill scheduled   Shipment/Pickup Date: 07/14/22          Objective    He has a past medical history of Essential (primary) hypertension and Stage 3 chronic kidney disease.    Tried/failed medications: taking with radiation     BP Readings from Last 4 Encounters:   07/05/22 96/70   07/05/22 96/70   06/24/22 134/72   06/17/22 (!) 167/81     Ht Readings from Last 4 Encounters:   07/05/22 6' 1" (1.854 m)   07/05/22 6' 1" (1.854 m)   06/24/22 6' 2" (1.88 m)   06/13/22 6' 2" (1.88 m)     Wt Readings from Last 4 Encounters:   07/05/22 72.6 kg (160 lb)   07/05/22 72.6 kg (160 lb)   06/13/22 84.4 kg (186 lb)   10/12/20 68.2 kg (150 lb 5.7 oz)     Recent Labs   Lab Result Units 06/16/22  0312 06/15/22  0419 06/14/22  0401 06/13/22  0445   RBC M/uL 3.72 L 4.02 L 4.01 L 3.91 L   Hemoglobin g/dL 7.9 L 8.4 L 8.5 L 8.2 L   Hematocrit % 27.9 L 29.8 L 30.9 L 28.7 L   WBC K/uL 8.30 9.60 7.13 5.25   Gran # (ANC) K/uL 5.7 7.8 H 5.0 3.6   Gran % % 69.1 81.3 H 69.6 67.9   Platelets K/uL 208 248 182 245   Sodium mmol/L 139 135 L 137 136   Potassium mmol/L 4.0 4.3 4.8 3.9   Chloride mmol/L 107 102 108 103   Glucose mg/dL 113 H 113 H 97 81   BUN mg/dL 15 14 13 11   Creatinine mg/dL 1.2 1.2 1.2 0.9   Calcium mg/dL 8.8 8.8 8.6 L 9.0   Total Protein g/dL 7.2 7.3 6.9 7.4   Albumin g/dL 2.6 L 2.9 L 2.8 L 2.8 L   Total Bilirubin mg/dL 0.8 0.8 1.0 4.1 H   Alkaline Phosphatase U/L 70 75 66 75   AST " U/L 11 12 13 12   ALT U/L 5 L 5 L 5 L 6 L     The goals of cancer treatment include:  · Achieving remission of cancer, if possible  · Reducing tumor size and spread of cancer, if remission is not possible  · Minimizing pain and symptoms of the cancer  · Preventing infection and other complications of treatment  · Promoting adequate nutrition  · Encouraging proper hydration  · Improving or maintaining quality of life  · Maintaining optimal therapy adherence  · Minimizing and managing side effects    Goals of Therapy Status: Discussed (new start)    Assessment/Plan  Patient plans to start therapy on 07/18/22      Indication, dosage, appropriateness, effectiveness, safety and convenience of his specialty medication(s) were reviewed today.     Patient Education   Patient received education on the following:    Expectations and possible outcomes of therapy   Proper use, timely administration, and missed dose management   Duration of therapy   Side effects, including prevention, minimization, and management   Contraindications and safety precautions   New or changed medications, including prescribe and over the counter medications and supplements   Reviews recommended vaccinations, as appropriate   Storage, safe handling, and disposal        Tasks added this encounter   8/8/2022 - Refill Call (Auto Added)  12/28/2022 - Clinical - Follow Up Assesement (180 day)   Tasks due within next 3 months   No tasks due.     Nella Quarles, PharmD  [facility]  [address]  [facility] [address]  Phone: [phone]  Fax: [phone]

## 2022-07-12 VITALS
SYSTOLIC BLOOD PRESSURE: 115 MMHG | RESPIRATION RATE: 14 BRPM | HEART RATE: 89 BPM | DIASTOLIC BLOOD PRESSURE: 60 MMHG | OXYGEN SATURATION: 99 % | TEMPERATURE: 98 F

## 2022-07-12 NOTE — PROGRESS NOTES
"  [facility]  Medical Home Visit    Visit Date: 7/7/2021  Encounter Provider: Ciro Saldivar NP  PCP:  Gopal Hobbs MD    Subjective:      Patient ID: Gera Londono is a 77 y.o. male.    Consult Requested By:  No ref. provider found  Reason for Consult:  Medical visit to establish care    Gera is being seen at home due to  physical debility that presents a taxing effort to leave the home, to mitigate high risk of hospital readmission or due to the limited availability of reliable or safe options for transportation to the point of access to the provider. The visit meets the criteria for medical necessity as defined by CMS as "health-care services needed to prevent, diagnose, or treat an illness, injury, condition, disease, or its symptoms and that meet accepted standards of medicine."  Prior to treatment on this visit the chart was reviewed and patient consent was obtained.    Chief Complaint: Establish Care and Follow-up    Gera Londono is a 77 y/o male with a pmhx significant for HTN, HLD, Stg III CKD, decreased mobility, and L hip pain. He is being seen in the home today in conjunction with his PCP Dr. Gopal Hobbs, as he is unable to easily transport out of the home. The patient currently lives in his own house on the [facility], where he has his x 2 nieces and x 1 uncle present 24/7 to care for him in the home. Patient/family indicate that the patient has never been  and has no children. 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 Hobbies are limited and include watching TV. When asked whether assisted living or NH may be an option for the patient, the family vehemently refused stating he is not to leave his home.    With this visit today patient is found lying on his couch with family present for visit. Most of his other history was received from his niece and his medical record. Patient currently AAOx2. Patient endorses hx of smoking x 20 years ago and has since quit. " "Social drinking history endorsed. Finished visits with HH and family refuses more visits. Patient endorses "great" appetite, normals BMs, and partial assistance with ADLs. Currently ambulates with cane or rollaider. No recent falls; patient mostly bedbound.    Recently diagnosed with rectal cancer. The following note is from his most recent oncology visit:     # Rectal cancer  He has biopsy proven rectal adenocarcinoma, G2, MMR testing is pending.  MRI shows a T4bN+ tumor.  CT chest is negative for metastatic disease, but he does not yet have abdominal cross-sectional imaging.     I discussed with him that assuming his CT abdomen (ordered today) is negative for distant disease, he has locally advanced but potentially curable rectal cancer.  We discussed his case at our colorectal MDC with recommendations for long course TYLER with capecitabine followed by chemotherapy with FOLFOX x 4 months.  Repeat staging scans following this to determine response and role for surgical resection.       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  With that said, his quality of life is reportedly good and he is interested in pursuing treatment.     I discussed the side effects and potential toxicities of capecitabine with them today and provided a written handout on the medication.  I am prescribing capecitabine 500 mg tabs with instructions to take 3 tabs in the AM and 3 tabs in the PM on the days of radiation only (just under 825 mg/m2 BID).  This should be taken with food.  The prescription was sent today to the [facility].  Instructed to begin the capecitabine on the first day of radiation.     I will follow-up with him prior to week 2 of radiation.  He will need port placement prior to IV chemotherapy after chemoradiation.     Will check PGX to eval for DPYD deficiency.     # JENNIFER 2/2 GI bleed  Will " order iron studies.  Likely will need to be set up for IV iron.     # CKD  Monitor renal function while on Xeloda.     # Debility  This should not necessarily preclude chemotherapy.  Will monitor closely his tolerance.     Follow up: 2-3 weeks for tolerance check on Xeloda.       Kelsey expressed to me, after the visit, she does not feel the patient can withstand chemotherapy and treatment. I discussed options regarding palliative care vs hospice vs treatment. She plans to reach out to the oncologist to express her concerns and to f/up with me. No additional needs at this this time. All of my information was given to the patient and family if any questions or concerns arise.    VSS. Denies fever, chest pain, shortness of breath, nausea, vomiting, diarrhea. Risks of environmental exposure to coronavirus discussed including: social distancing, hand hygiene, and limiting departures from the home for necessities only.  Reports understanding and willingness to comply.  All hospital discharge orders reviewed and being followed, all medications reconciled and reviewed, patient and family verbalized understanding. No other needs identified at this time.      I initiated the process of advance care planning today and explained the importance of this process to the patient and family.  I introduced the concept of advance directives to the patient, as well. The patient has not previously appointed a HCPOA. Then the patient received detailed information about the importance of designating a Health Care Power of  (HCPOA). LAPOST given to family, plan to retrieve on next visit. The patient was also instructed to communicate with this person about their wishes for future healthcare, should patient become sick and lose decision-making capacity. We spoke about ACP for 30 minutes.    Attestation: Screening criteria to assess the level of the patient's risk for infection with COVID-19 as recommended by the CDC at the time  of the above documented home visit concluded appropriateness to proceed. Universal precautions were maintained at all times, including provider use of 60% alcohol gel hand  immediately prior to entry and upon departing the patient's home.    Review of Systems   Unable to perform ROS: Dementia       Assessments:  · Environmental: steps upon entrance, cluttered  · Functional Status: partial assist  · Safety: high FR  · Nutritional: adequate  · Home Health/DME/Supplies: cane, rollaider, walker    Objective:   Physical Exam  Vitals reviewed.   Constitutional:       Appearance: He is well-developed.   HENT:      Head: Normocephalic and atraumatic.   Eyes:      General: Lids are normal.      Pupils: Pupils are equal, round, and reactive to light.   Cardiovascular:      Rate and Rhythm: Normal rate.   Pulmonary:      Effort: Pulmonary effort is normal.      Breath sounds: Normal breath sounds.   Abdominal:      General: Bowel sounds are normal.      Palpations: Abdomen is soft.   Musculoskeletal:         General: Normal range of motion.      Cervical back: Normal range of motion and neck supple.      Right lower leg: No edema.      Left lower leg: No edema.   Skin:     General: Skin is warm and dry.      Comments: No breakdown   Neurological:      Mental Status: He is alert. Mental status is at baseline.      GCS: GCS eye subscore is 4. GCS verbal subscore is 3. GCS motor subscore is 6.      Comments: Generalized weakness.   Psychiatric:         Mood and Affect: Mood normal.         Behavior: Behavior normal.         Vitals:    07/12/22 1206   BP: 115/60   Pulse: 89   Resp: 14   Temp: 98.3 °F (36.8 °C)   SpO2: 99%   PainSc: 0-No pain     There is no height or weight on file to calculate BMI.    Assessment:     No diagnosis found.    Plan:     Ethical / Legal: Advance Care Planning   · Surrogate decision maker:  Payton Choi, Relationship: niece  · Code Status:  undecided  · LaPOST:  Given to patient and  family; discussed, plan to obtain at next visit  · Other advance directive:  None on file, Capacity to make medical decisions:  none, Conflict none       Gera was seen today for establish care.    Diagnoses and all orders for this visit:    Essential (primary) hypertension  --compliant with antihypertensives  --normotensive at visit    Other hyperlipidemia  --compliant with statin therapy    Stage 3 chronic kidney disease, unspecified whether stage 3a or 3b CKD  --adequate u/o per patient and family  --last (4/2021) Cr/BUN: 1.6/21  --plan to obtain labs at next visit per Dr. Hobbs (PCP)    Vitamin D deficiency  -     ergocalciferol (ERGOCALCIFEROL) 50,000 unit Cap; Take 1 capsule (50,000 Units total) by mouth every 7 days. Follow up Dr. DENITA Hobbs August 2020    Osteoarthritis, unspecified osteoarthritis type, unspecified site  --working with OT  --family refusing PT    Decreased mobility  --see OA  --neuro referral placed    Rectal cancer  --see above note      Were controlled substances prescribed?  No    Follow Up Appointments:   Future Appointments   Date Time Provider Department Center   7/25/2022  9:40 AM LAB, [facility] LAB [facility]   7/25/2022 10:30 AM Jeff Marie MD [facility] HEMONC2 [facility]   7/28/2022  9:00 AM Jaimee Morillo RD [facility] INONCSF [facility]       Signature:  [facility]    Total face-to-face time was 60 minutes, >50% of this was spent on counseling and coordination of care. The following issues were discussed: primary and secondary diagnoses, co-morbidities, prescribed medications, treatment modalities, importance of compliance with medical advice, and directives for follow-up care.

## 2022-07-12 NOTE — PATIENT INSTRUCTIONS
Instructions:  - [facility] Practition[facility] to schedule home follow-up visit with patient in 8 weeks.  - Continue all medications, treatments and therapies as ordered.   - Follow all instructions, recommendations as discussed.  - Maintain Safety Precautions at all times.  - Attend all medical appointments as scheduled.  - For worsening symptoms: call Primary Care Physician or Nurse Practitioner.  - For emergencies, call 911 or immediately report to the nearest emergency room.  - Limit Risks of environmental exposure to coronavirus/COVID-19 as discussed including: social distancing, hand hygiene, and limiting departures from the home for necessities only.

## 2022-07-15 LAB
FINAL PATHOLOGIC DIAGNOSIS: NORMAL
Lab: NORMAL
SUPPLEMENTAL DIAGNOSIS: NORMAL

## 2022-07-15 NOTE — PLAN OF CARE
DISCONTINUE ON PATHWAY REGIMEN - Colorectal    [id number]        Capecitabine (Xeloda)           Additional Orders: Capecitabine is given concurrently with radiation   therapy.    **Always confirm dose/schedule in your pharmacy ordering system**    REASON: Other Reason  PRIOR TREATMENT: [id number]  TREATMENT RESPONSE: Unable to Evaluate    START ON PATHWAY REGIMEN - Colorectal    [id number]        Oxaliplatin (Eloxatin)       Leucovorin       Fluorouracil       Fluorouracil     **Always confirm dose/schedule in your pharmacy ordering system**    Patient Characteristics:  Preoperative or Nonsurgical Candidate (Clinical Staging), Rectal, cT3 - cT4, cN0   or Any cT, cN+  Tumor Location: Rectal  Therapeutic Status: Preoperative or Nonsurgical Candidate (Clinical Staging)  AJCC T Category: cT4b  AJCC N Category: cN1b  AJCC M Category: cM0  AJCC 8 Stage Grouping: IIIC  Intent of Therapy:  Curative Intent, Discussed with Patient

## 2022-07-18 PROCEDURE — 77301 RADIOTHERAPY DOSE PLAN IMRT: CPT | Mod: 26,,, | Performed by: STUDENT IN AN ORGANIZED HEALTH CARE EDUCATION/TRAINING PROGRAM

## 2022-07-18 PROCEDURE — 77301 RADIOTHERAPY DOSE PLAN IMRT: CPT | Mod: TC | Performed by: STUDENT IN AN ORGANIZED HEALTH CARE EDUCATION/TRAINING PROGRAM

## 2022-07-18 PROCEDURE — 77301 PR  INTEN MOD RADIOTHER PLAN W/DOSE VOL HIST: ICD-10-PCS | Mod: 26,,, | Performed by: STUDENT IN AN ORGANIZED HEALTH CARE EDUCATION/TRAINING PROGRAM

## 2022-07-19 PROCEDURE — 77470 PR  SPECIAL RADIATION TREATMENT: ICD-10-PCS | Mod: 26,59,, | Performed by: STUDENT IN AN ORGANIZED HEALTH CARE EDUCATION/TRAINING PROGRAM

## 2022-07-19 PROCEDURE — 77300 RADIATION THERAPY DOSE PLAN: CPT | Mod: TC | Performed by: STUDENT IN AN ORGANIZED HEALTH CARE EDUCATION/TRAINING PROGRAM

## 2022-07-19 PROCEDURE — 77338 DESIGN MLC DEVICE FOR IMRT: CPT | Mod: 26,,, | Performed by: STUDENT IN AN ORGANIZED HEALTH CARE EDUCATION/TRAINING PROGRAM

## 2022-07-19 PROCEDURE — 77338 DESIGN MLC DEVICE FOR IMRT: CPT | Mod: TC | Performed by: STUDENT IN AN ORGANIZED HEALTH CARE EDUCATION/TRAINING PROGRAM

## 2022-07-19 PROCEDURE — 77470 SPECIAL RADIATION TREATMENT: CPT | Mod: 26,59,, | Performed by: STUDENT IN AN ORGANIZED HEALTH CARE EDUCATION/TRAINING PROGRAM

## 2022-07-19 PROCEDURE — 77338 PR  MLC IMRT DESIGN & CONSTRUCTION PER IMRT PLAN: ICD-10-PCS | Mod: 26,,, | Performed by: STUDENT IN AN ORGANIZED HEALTH CARE EDUCATION/TRAINING PROGRAM

## 2022-07-19 PROCEDURE — 77470 SPECIAL RADIATION TREATMENT: CPT | Mod: 59,TC | Performed by: STUDENT IN AN ORGANIZED HEALTH CARE EDUCATION/TRAINING PROGRAM

## 2022-07-19 PROCEDURE — 77300 PR RADIATION THERAPY,DOSIMETRY PLAN: ICD-10-PCS | Mod: 26,,, | Performed by: STUDENT IN AN ORGANIZED HEALTH CARE EDUCATION/TRAINING PROGRAM

## 2022-07-19 PROCEDURE — 77300 RADIATION THERAPY DOSE PLAN: CPT | Mod: 26,,, | Performed by: STUDENT IN AN ORGANIZED HEALTH CARE EDUCATION/TRAINING PROGRAM

## 2022-07-22 ENCOUNTER — TELEPHONE (OUTPATIENT)
Dept: HEMATOLOGY/ONCOLOGY | Facility: CLINIC | Age: 77
End: 2022-07-22
Payer: MEDICARE

## 2022-07-25 DIAGNOSIS — C20 RECTAL CANCER: ICD-10-CM

## 2022-07-25 DIAGNOSIS — K62.5 GASTROINTESTINAL HEMORRHAGE ASSOCIATED WITH ANORECTAL SOURCE: Primary | ICD-10-CM

## 2022-08-01 ENCOUNTER — CARE AT HOME (OUTPATIENT)
Dept: HOME HEALTH SERVICES | Facility: CLINIC | Age: 77
End: 2022-08-01
Payer: MEDICARE

## 2022-08-01 DIAGNOSIS — C20 RECTAL CANCER: Primary | ICD-10-CM

## 2022-08-01 PROCEDURE — 99350 HOME/RES VST EST HIGH MDM 60: CPT | Mod: S$GLB,,, | Performed by: NURSE PRACTITIONER

## 2022-08-01 PROCEDURE — 99350 PR HOME VISIT,ESTAB PATIENT,LEVEL IV: ICD-10-PCS | Mod: S$GLB,,, | Performed by: NURSE PRACTITIONER

## 2022-08-01 PROCEDURE — 99497 PR ADVNCD CARE PLAN 30 MIN: ICD-10-PCS | Mod: S$GLB,,, | Performed by: NURSE PRACTITIONER

## 2022-08-01 PROCEDURE — 99497 ADVNCD CARE PLAN 30 MIN: CPT | Mod: S$GLB,,, | Performed by: NURSE PRACTITIONER

## 2022-08-03 ENCOUNTER — DOCUMENT SCAN (OUTPATIENT)
Dept: HOME HEALTH SERVICES | Facility: HOSPITAL | Age: 77
End: 2022-08-03
Payer: MEDICARE

## 2022-08-04 VITALS
OXYGEN SATURATION: 99 % | DIASTOLIC BLOOD PRESSURE: 62 MMHG | HEART RATE: 98 BPM | SYSTOLIC BLOOD PRESSURE: 108 MMHG | RESPIRATION RATE: 18 BRPM

## 2022-08-04 NOTE — PROGRESS NOTES
"  [facility]  Medical Home Visit    Visit Date: 8/1/2021  Encounter Provider: Ciro Saldivar NP  PCP:  Gopal Hobbs MD    Subjective:      Patient ID: Gera Londono is a 77 y.o. male.    Consult Requested By:  No ref. provider found  Reason for Consult:  Medical visit to establish care    Gera is being seen at home due to  physical debility that presents a taxing effort to leave the home, to mitigate high risk of hospital readmission or due to the limited availability of reliable or safe options for transportation to the point of access to the provider. The visit meets the criteria for medical necessity as defined by CMS as "health-care services needed to prevent, diagnose, or treat an illness, injury, condition, disease, or its symptoms and that meet accepted standards of medicine."  Prior to treatment on this visit the chart was reviewed and patient consent was obtained.    Chief Complaint: Establish Care and Follow-up    Gera Londono is a 77 y/o male with a pmhx significant for HTN, HLD, Stg III CKD, decreased mobility, and L hip pain. He is being seen in the home today in conjunction with his PCP Dr. Gopal Hobbs, as he is unable to easily transport out of the home. The patient currently lives in his own house on the [facility], where he has his x 2 nieces and x 1 uncle present 24/7 to care for him in the home. Patient/family indicate that the patient has never been  and has no children. 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 Hobbies are limited and include watching TV. When asked whether assisted living or NH may be an option for the patient, the family vehemently refused stating he is not to leave his home.    With this visit today patient is found lying on his couch with family present for visit. Most of his other history was received from his niece and his medical record. Patient currently AAOx2. Patient endorses hx of smoking x 20 years ago and has since quit. " "Social drinking history endorsed. Finished visits with HH and family refuses more visits. Patient endorses "great" appetite, normals BMs, and partial assistance with ADLs. Currently ambulates with cane or rollaider. No recent falls; patient mostly bedbound.    Recently diagnosed with rectal cancer. Patient offered chemo and radiation and refused. I personally spoke with his oncologist Dr. Marie an relayed that message with questions on how to proceed. After our conversation, he offered the patient immunotherapy, as his type of cancer is highly responsive to this treatment. Patient refused treatment. Dr. Marie indicated that hospice is appropriate if patient is refusing treatment. [facility] referral placed. Plan for educational visit on 8/8/22. No additional needs at this this time. All of my information was given to the patient and family if any questions or concerns arise. I will plan to sign off if admitted to hospice.    VSS. Denies fever, chest pain, shortness of breath, nausea, vomiting, diarrhea. Risks of environmental exposure to coronavirus discussed including: social distancing, hand hygiene, and limiting departures from the home for necessities only.  Reports understanding and willingness to comply.  All hospital discharge orders reviewed and being followed, all medications reconciled and reviewed, patient and family verbalized understanding. No other needs identified at this time.      I initiated the process of advance care planning today and explained the importance of this process to the patient and family.  I introduced the concept of advance directives to the patient, as well. The patient has not previously appointed a HCPOA. Then the patient received detailed information about the importance of designating a Health Care Power of  (HCPOA). LAPOST given to family, plan to retrieve on next visit. The patient was also instructed to communicate with this person about their wishes for future " healthcare, should patient become sick and lose decision-making capacity. We spoke about ACP for 30 minutes.    Attestation: Screening criteria to assess the level of the patient's risk for infection with COVID-19 as recommended by the CDC at the time of the above documented home visit concluded appropriateness to proceed. Universal precautions were maintained at all times, including provider use of 60% alcohol gel hand  immediately prior to entry and upon departing the patient's home.    Review of Systems   Unable to perform ROS: Dementia       Assessments:  · Environmental: steps upon entrance, cluttered  · Functional Status: partial assist  · Safety: high FR  · Nutritional: adequate  · Home Health/DME/Supplies: cane, rollaider, walker    Objective:   Physical Exam  Vitals reviewed.   Constitutional:       Appearance: He is well-developed.   HENT:      Head: Normocephalic and atraumatic.   Eyes:      General: Lids are normal.      Pupils: Pupils are equal, round, and reactive to light.   Cardiovascular:      Rate and Rhythm: Normal rate.   Pulmonary:      Effort: Pulmonary effort is normal.      Breath sounds: Normal breath sounds.   Abdominal:      General: Bowel sounds are normal.      Palpations: Abdomen is soft.   Musculoskeletal:         General: Normal range of motion.      Cervical back: Normal range of motion and neck supple.      Right lower leg: No edema.      Left lower leg: No edema.   Skin:     General: Skin is warm and dry.      Comments: No breakdown   Neurological:      Mental Status: He is alert. Mental status is at baseline.      GCS: GCS eye subscore is 4. GCS verbal subscore is 3. GCS motor subscore is 6.      Comments: Generalized weakness.   Psychiatric:         Mood and Affect: Mood normal.         Behavior: Behavior normal.         Vitals:    08/04/22 1209   BP: 108/62   Pulse: 98   Resp: 18   SpO2: 99%   PainSc: 0-No pain     There is no height or weight on file to calculate  BMI.    Assessment:     No diagnosis found.    Plan:     Ethical / Legal: Advance Care Planning   · Surrogate decision maker:  Name Kelsey Choi, Relationship: niece  · Code Status:  undecided  · [place]:  Given to patient and family; discussed, plan to obtain at next visit  · Other advance directive:  None on file, Capacity to make medical decisions:  none, Conflict none       Gera was seen today for establish care.    Diagnoses and all orders for this visit:    Essential (primary) hypertension  --compliant with antihypertensives  --normotensive at visit    Other hyperlipidemia  --compliant with statin therapy    Stage 3 chronic kidney disease, unspecified whether stage 3a or 3b CKD  --adequate u/o per patient and family  --last (4/2021) Cr/BUN: 1.6/21  --plan to obtain labs at next visit per Dr. Hobbs (PCP)    Vitamin D deficiency  -     ergocalciferol (ERGOCALCIFEROL) 50,000 unit Cap; Take 1 capsule (50,000 Units total) by mouth every 7 days. Follow up Dr. DENITA Hobbs August 2020    Osteoarthritis, unspecified osteoarthritis type, unspecified site  --working with OT  --family refusing PT    Decreased mobility  --see OA  --neuro referral placed    Rectal cancer  --see above note      Were controlled substances prescribed?  No    Follow Up Appointments:   No future appointments.    Signature:  Rikki Judice, AG-ACNP [facility]    Total face-to-face time was 60 minutes, >50% of this was spent on counseling and coordination of care. The following issues were discussed: primary and secondary diagnoses, co-morbidities, prescribed medications, treatment modalities, importance of compliance with medical advice, and directives for follow-up care.

## 2022-08-08 ENCOUNTER — SPECIALTY PHARMACY (OUTPATIENT)
Dept: PHARMACY | Facility: CLINIC | Age: 77
End: 2022-08-08
Payer: MEDICARE

## 2022-08-08 ENCOUNTER — DOCUMENT SCAN (OUTPATIENT)
Dept: HOME HEALTH SERVICES | Facility: HOSPITAL | Age: 77
End: 2022-08-08

## 2022-08-08 ENCOUNTER — DOCUMENT SCAN (OUTPATIENT)
Dept: HOME HEALTH SERVICES | Facility: HOSPITAL | Age: 77
End: 2022-08-08
Payer: MEDICARE

## 2023-02-09 DIAGNOSIS — Z00.00 ENCOUNTER FOR MEDICARE ANNUAL WELLNESS EXAM: ICD-10-CM
